# Patient Record
Sex: FEMALE | Race: WHITE | NOT HISPANIC OR LATINO | Employment: OTHER | URBAN - METROPOLITAN AREA
[De-identification: names, ages, dates, MRNs, and addresses within clinical notes are randomized per-mention and may not be internally consistent; named-entity substitution may affect disease eponyms.]

---

## 2017-01-19 ENCOUNTER — COMMUNICATION - HEALTHEAST (OUTPATIENT)
Dept: NURSING | Facility: CLINIC | Age: 82
End: 2017-01-19

## 2017-01-19 ENCOUNTER — AMBULATORY - HEALTHEAST (OUTPATIENT)
Dept: LAB | Facility: CLINIC | Age: 82
End: 2017-01-19

## 2017-01-19 DIAGNOSIS — Z79.01 LONG TERM (CURRENT) USE OF ANTICOAGULANTS: ICD-10-CM

## 2017-01-19 DIAGNOSIS — I82.409 DVT (DEEP VENOUS THROMBOSIS) (H): ICD-10-CM

## 2017-03-01 ENCOUNTER — AMBULATORY - HEALTHEAST (OUTPATIENT)
Dept: LAB | Facility: CLINIC | Age: 82
End: 2017-03-01

## 2017-03-01 ENCOUNTER — COMMUNICATION - HEALTHEAST (OUTPATIENT)
Dept: NURSING | Facility: CLINIC | Age: 82
End: 2017-03-01

## 2017-03-01 DIAGNOSIS — Z79.01 LONG TERM (CURRENT) USE OF ANTICOAGULANTS: ICD-10-CM

## 2017-03-01 DIAGNOSIS — I82.409 DVT (DEEP VENOUS THROMBOSIS) (H): ICD-10-CM

## 2017-03-15 ENCOUNTER — COMMUNICATION - HEALTHEAST (OUTPATIENT)
Dept: NURSING | Facility: CLINIC | Age: 82
End: 2017-03-15

## 2017-03-15 DIAGNOSIS — Z86.711 HISTORY OF PULMONARY EMBOLUS (PE): ICD-10-CM

## 2017-03-15 DIAGNOSIS — Z79.01 LONG TERM (CURRENT) USE OF ANTICOAGULANTS: ICD-10-CM

## 2017-04-05 ENCOUNTER — COMMUNICATION - HEALTHEAST (OUTPATIENT)
Dept: NURSING | Facility: CLINIC | Age: 82
End: 2017-04-05

## 2017-04-11 ENCOUNTER — AMBULATORY - HEALTHEAST (OUTPATIENT)
Dept: LAB | Facility: CLINIC | Age: 82
End: 2017-04-11

## 2017-04-11 ENCOUNTER — COMMUNICATION - HEALTHEAST (OUTPATIENT)
Dept: NURSING | Facility: CLINIC | Age: 82
End: 2017-04-11

## 2017-04-11 DIAGNOSIS — Z79.01 LONG TERM (CURRENT) USE OF ANTICOAGULANTS: ICD-10-CM

## 2017-04-11 DIAGNOSIS — Z86.711 HISTORY OF PULMONARY EMBOLUS (PE): ICD-10-CM

## 2017-05-17 ENCOUNTER — COMMUNICATION - HEALTHEAST (OUTPATIENT)
Dept: NURSING | Facility: CLINIC | Age: 82
End: 2017-05-17

## 2017-05-17 ENCOUNTER — AMBULATORY - HEALTHEAST (OUTPATIENT)
Dept: LAB | Facility: CLINIC | Age: 82
End: 2017-05-17

## 2017-05-17 DIAGNOSIS — Z79.01 LONG TERM (CURRENT) USE OF ANTICOAGULANTS: ICD-10-CM

## 2017-05-17 DIAGNOSIS — Z86.711 HISTORY OF PULMONARY EMBOLUS (PE): ICD-10-CM

## 2017-06-12 ENCOUNTER — COMMUNICATION - HEALTHEAST (OUTPATIENT)
Dept: INTERNAL MEDICINE | Facility: CLINIC | Age: 82
End: 2017-06-12

## 2017-06-21 ENCOUNTER — AMBULATORY - HEALTHEAST (OUTPATIENT)
Dept: LAB | Facility: CLINIC | Age: 82
End: 2017-06-21

## 2017-06-21 ENCOUNTER — COMMUNICATION - HEALTHEAST (OUTPATIENT)
Dept: NURSING | Facility: CLINIC | Age: 82
End: 2017-06-21

## 2017-06-21 DIAGNOSIS — Z79.01 LONG TERM (CURRENT) USE OF ANTICOAGULANTS: ICD-10-CM

## 2017-06-21 DIAGNOSIS — Z86.711 HISTORY OF PULMONARY EMBOLUS (PE): ICD-10-CM

## 2017-07-17 ENCOUNTER — AMBULATORY - HEALTHEAST (OUTPATIENT)
Dept: LAB | Facility: CLINIC | Age: 82
End: 2017-07-17

## 2017-07-17 ENCOUNTER — COMMUNICATION - HEALTHEAST (OUTPATIENT)
Dept: NURSING | Facility: CLINIC | Age: 82
End: 2017-07-17

## 2017-07-17 DIAGNOSIS — Z86.711 HISTORY OF PULMONARY EMBOLUS (PE): ICD-10-CM

## 2017-07-17 DIAGNOSIS — Z79.01 LONG TERM (CURRENT) USE OF ANTICOAGULANTS: ICD-10-CM

## 2017-08-21 ENCOUNTER — COMMUNICATION - HEALTHEAST (OUTPATIENT)
Dept: NURSING | Facility: CLINIC | Age: 82
End: 2017-08-21

## 2017-08-22 ENCOUNTER — COMMUNICATION - HEALTHEAST (OUTPATIENT)
Dept: INTERNAL MEDICINE | Facility: CLINIC | Age: 82
End: 2017-08-22

## 2017-08-23 ENCOUNTER — AMBULATORY - HEALTHEAST (OUTPATIENT)
Dept: LAB | Facility: CLINIC | Age: 82
End: 2017-08-23

## 2017-08-23 ENCOUNTER — COMMUNICATION - HEALTHEAST (OUTPATIENT)
Dept: NURSING | Facility: CLINIC | Age: 82
End: 2017-08-23

## 2017-08-23 ENCOUNTER — AMBULATORY - HEALTHEAST (OUTPATIENT)
Dept: INTERNAL MEDICINE | Facility: CLINIC | Age: 82
End: 2017-08-23

## 2017-08-23 DIAGNOSIS — Z79.01 LONG TERM (CURRENT) USE OF ANTICOAGULANTS: ICD-10-CM

## 2017-08-23 DIAGNOSIS — Z86.711 HISTORY OF PULMONARY EMBOLUS (PE): ICD-10-CM

## 2017-09-16 ENCOUNTER — COMMUNICATION - HEALTHEAST (OUTPATIENT)
Dept: INTERNAL MEDICINE | Facility: CLINIC | Age: 82
End: 2017-09-16

## 2017-09-16 DIAGNOSIS — I10 HTN (HYPERTENSION): ICD-10-CM

## 2017-09-26 ENCOUNTER — AMBULATORY - HEALTHEAST (OUTPATIENT)
Dept: LAB | Facility: CLINIC | Age: 82
End: 2017-09-26

## 2017-09-26 ENCOUNTER — COMMUNICATION - HEALTHEAST (OUTPATIENT)
Dept: NURSING | Facility: CLINIC | Age: 82
End: 2017-09-26

## 2017-09-26 DIAGNOSIS — Z86.711 HISTORY OF PULMONARY EMBOLUS (PE): ICD-10-CM

## 2017-09-26 DIAGNOSIS — Z79.01 LONG TERM (CURRENT) USE OF ANTICOAGULANTS: ICD-10-CM

## 2017-11-07 ENCOUNTER — COMMUNICATION - HEALTHEAST (OUTPATIENT)
Dept: NURSING | Facility: CLINIC | Age: 82
End: 2017-11-07

## 2017-11-07 ENCOUNTER — AMBULATORY - HEALTHEAST (OUTPATIENT)
Dept: NURSING | Facility: CLINIC | Age: 82
End: 2017-11-07

## 2017-11-07 ENCOUNTER — AMBULATORY - HEALTHEAST (OUTPATIENT)
Dept: LAB | Facility: CLINIC | Age: 82
End: 2017-11-07

## 2017-11-07 DIAGNOSIS — Z79.01 LONG TERM CURRENT USE OF ANTICOAGULANT THERAPY: ICD-10-CM

## 2017-11-07 DIAGNOSIS — Z23 NEED FOR INFLUENZA VACCINATION: ICD-10-CM

## 2017-11-07 DIAGNOSIS — Z86.711 HISTORY OF PULMONARY EMBOLUS (PE): ICD-10-CM

## 2017-11-25 ENCOUNTER — COMMUNICATION - HEALTHEAST (OUTPATIENT)
Dept: INTERNAL MEDICINE | Facility: CLINIC | Age: 82
End: 2017-11-25

## 2017-12-18 ENCOUNTER — COMMUNICATION - HEALTHEAST (OUTPATIENT)
Dept: INTERNAL MEDICINE | Facility: CLINIC | Age: 82
End: 2017-12-18

## 2017-12-18 DIAGNOSIS — Z86.711 HISTORY OF PULMONARY EMBOLUS (PE): ICD-10-CM

## 2017-12-18 DIAGNOSIS — Z79.01 LONG TERM CURRENT USE OF ANTICOAGULANT THERAPY: ICD-10-CM

## 2017-12-20 ENCOUNTER — AMBULATORY - HEALTHEAST (OUTPATIENT)
Dept: LAB | Facility: CLINIC | Age: 82
End: 2017-12-20

## 2017-12-20 ENCOUNTER — COMMUNICATION - HEALTHEAST (OUTPATIENT)
Dept: NURSING | Facility: CLINIC | Age: 82
End: 2017-12-20

## 2017-12-20 DIAGNOSIS — Z79.01 LONG TERM CURRENT USE OF ANTICOAGULANT THERAPY: ICD-10-CM

## 2017-12-20 DIAGNOSIS — Z86.711 HISTORY OF PULMONARY EMBOLUS (PE): ICD-10-CM

## 2018-01-11 ENCOUNTER — COMMUNICATION - HEALTHEAST (OUTPATIENT)
Dept: INTERNAL MEDICINE | Facility: CLINIC | Age: 83
End: 2018-01-11

## 2018-01-11 DIAGNOSIS — E87.6 HYPOKALEMIA: ICD-10-CM

## 2018-01-15 ENCOUNTER — AMBULATORY - HEALTHEAST (OUTPATIENT)
Dept: INTERNAL MEDICINE | Facility: CLINIC | Age: 83
End: 2018-01-15

## 2018-01-15 ENCOUNTER — COMMUNICATION - HEALTHEAST (OUTPATIENT)
Dept: NURSING | Facility: CLINIC | Age: 83
End: 2018-01-15

## 2018-01-15 ENCOUNTER — COMMUNICATION - HEALTHEAST (OUTPATIENT)
Dept: INTERNAL MEDICINE | Facility: CLINIC | Age: 83
End: 2018-01-15

## 2018-01-15 ENCOUNTER — OFFICE VISIT - HEALTHEAST (OUTPATIENT)
Dept: INTERNAL MEDICINE | Facility: CLINIC | Age: 83
End: 2018-01-15

## 2018-01-15 DIAGNOSIS — E87.6 HYPOKALEMIA: ICD-10-CM

## 2018-01-15 DIAGNOSIS — M15.9 OSTEOARTHRITIS OF MULTIPLE JOINTS: ICD-10-CM

## 2018-01-15 DIAGNOSIS — Z79.01 LONG TERM CURRENT USE OF ANTICOAGULANT THERAPY: ICD-10-CM

## 2018-01-15 DIAGNOSIS — I10 ESSENTIAL HYPERTENSION: ICD-10-CM

## 2018-01-15 DIAGNOSIS — R60.0 PERIPHERAL EDEMA: ICD-10-CM

## 2018-01-15 DIAGNOSIS — Z12.31 ENCOUNTER FOR SCREENING MAMMOGRAM FOR MALIGNANT NEOPLASM OF BREAST: ICD-10-CM

## 2018-01-15 DIAGNOSIS — Z78.0 POSTMENOPAUSAL: ICD-10-CM

## 2018-01-15 DIAGNOSIS — H40.9 GLAUCOMA: ICD-10-CM

## 2018-01-15 DIAGNOSIS — Z51.81 MEDICATION MONITORING ENCOUNTER: ICD-10-CM

## 2018-01-15 DIAGNOSIS — Z00.00 MEDICARE ANNUAL WELLNESS VISIT, INITIAL: ICD-10-CM

## 2018-01-15 DIAGNOSIS — Z86.711 HISTORY OF PULMONARY EMBOLUS (PE): ICD-10-CM

## 2018-01-15 LAB
ALBUMIN SERPL-MCNC: 3.3 G/DL (ref 3.5–5)
ALBUMIN UR-MCNC: NEGATIVE MG/DL
ALP SERPL-CCNC: 83 U/L (ref 45–120)
ALT SERPL W P-5'-P-CCNC: 19 U/L (ref 0–45)
ANION GAP SERPL CALCULATED.3IONS-SCNC: 10 MMOL/L (ref 5–18)
APPEARANCE UR: CLEAR
AST SERPL W P-5'-P-CCNC: 18 U/L (ref 0–40)
BACTERIA #/AREA URNS HPF: ABNORMAL HPF
BILIRUB SERPL-MCNC: 0.5 MG/DL (ref 0–1)
BILIRUB UR QL STRIP: NEGATIVE
BUN SERPL-MCNC: 19 MG/DL (ref 8–28)
CALCIUM SERPL-MCNC: 9.4 MG/DL (ref 8.5–10.5)
CHLORIDE BLD-SCNC: 101 MMOL/L (ref 98–107)
CHOLEST SERPL-MCNC: 196 MG/DL
CO2 SERPL-SCNC: 31 MMOL/L (ref 22–31)
COLOR UR AUTO: YELLOW
CREAT SERPL-MCNC: 0.8 MG/DL (ref 0.6–1.1)
FASTING STATUS PATIENT QL REPORTED: YES
GFR SERPL CREATININE-BSD FRML MDRD: >60 ML/MIN/1.73M2
GLUCOSE BLD-MCNC: 94 MG/DL (ref 70–125)
GLUCOSE UR STRIP-MCNC: NEGATIVE MG/DL
HDLC SERPL-MCNC: 53 MG/DL
HGB BLD-MCNC: 13 G/DL (ref 12–16)
HGB UR QL STRIP: ABNORMAL
INR PPP: 2.8 (ref 0.9–1.1)
KETONES UR STRIP-MCNC: NEGATIVE MG/DL
LDLC SERPL CALC-MCNC: 127 MG/DL
LEUKOCYTE ESTERASE UR QL STRIP: NEGATIVE
NITRATE UR QL: NEGATIVE
PH UR STRIP: 7 [PH] (ref 5–8)
POTASSIUM BLD-SCNC: 3.1 MMOL/L (ref 3.5–5)
PROT SERPL-MCNC: 7.3 G/DL (ref 6–8)
RBC #/AREA URNS AUTO: ABNORMAL HPF
SODIUM SERPL-SCNC: 142 MMOL/L (ref 136–145)
SP GR UR STRIP: 1.02 (ref 1–1.03)
SQUAMOUS #/AREA URNS AUTO: ABNORMAL LPF
TRIGL SERPL-MCNC: 80 MG/DL
UROBILINOGEN UR STRIP-ACNC: ABNORMAL
WBC #/AREA URNS AUTO: ABNORMAL HPF

## 2018-01-15 ASSESSMENT — MIFFLIN-ST. JEOR: SCORE: 1240.34

## 2018-02-05 ENCOUNTER — COMMUNICATION - HEALTHEAST (OUTPATIENT)
Dept: NURSING | Facility: CLINIC | Age: 83
End: 2018-02-05

## 2018-02-05 DIAGNOSIS — Z79.01 LONG TERM CURRENT USE OF ANTICOAGULANT THERAPY: ICD-10-CM

## 2018-02-05 DIAGNOSIS — Z86.711 HISTORY OF PULMONARY EMBOLUS (PE): ICD-10-CM

## 2018-02-15 ENCOUNTER — COMMUNICATION - HEALTHEAST (OUTPATIENT)
Dept: NURSING | Facility: CLINIC | Age: 83
End: 2018-02-15

## 2018-02-15 ENCOUNTER — COMMUNICATION - HEALTHEAST (OUTPATIENT)
Dept: INTERNAL MEDICINE | Facility: CLINIC | Age: 83
End: 2018-02-15

## 2018-02-15 ENCOUNTER — AMBULATORY - HEALTHEAST (OUTPATIENT)
Dept: INTERNAL MEDICINE | Facility: CLINIC | Age: 83
End: 2018-02-15

## 2018-02-15 ENCOUNTER — AMBULATORY - HEALTHEAST (OUTPATIENT)
Dept: LAB | Facility: CLINIC | Age: 83
End: 2018-02-15

## 2018-02-15 DIAGNOSIS — Z79.01 LONG TERM CURRENT USE OF ANTICOAGULANT THERAPY: ICD-10-CM

## 2018-02-15 DIAGNOSIS — Z86.711 HISTORY OF PULMONARY EMBOLUS (PE): ICD-10-CM

## 2018-02-15 DIAGNOSIS — I10 ESSENTIAL HYPERTENSION: ICD-10-CM

## 2018-02-15 LAB
ANION GAP SERPL CALCULATED.3IONS-SCNC: 7 MMOL/L (ref 5–18)
BUN SERPL-MCNC: 24 MG/DL (ref 8–28)
CALCIUM SERPL-MCNC: 9.1 MG/DL (ref 8.5–10.5)
CHLORIDE BLD-SCNC: 106 MMOL/L (ref 98–107)
CO2 SERPL-SCNC: 29 MMOL/L (ref 22–31)
CREAT SERPL-MCNC: 1.11 MG/DL (ref 0.6–1.1)
GFR SERPL CREATININE-BSD FRML MDRD: 47 ML/MIN/1.73M2
GLUCOSE BLD-MCNC: 85 MG/DL (ref 70–125)
INR PPP: 2.7 (ref 0.9–1.1)
POTASSIUM BLD-SCNC: 5.3 MMOL/L (ref 3.5–5)
SODIUM SERPL-SCNC: 142 MMOL/L (ref 136–145)

## 2018-02-16 ENCOUNTER — COMMUNICATION - HEALTHEAST (OUTPATIENT)
Dept: INTERNAL MEDICINE | Facility: CLINIC | Age: 83
End: 2018-02-16

## 2018-02-24 ENCOUNTER — COMMUNICATION - HEALTHEAST (OUTPATIENT)
Dept: INTERNAL MEDICINE | Facility: CLINIC | Age: 83
End: 2018-02-24

## 2018-02-24 DIAGNOSIS — I10 HYPERTENSION: ICD-10-CM

## 2018-03-06 ENCOUNTER — COMMUNICATION - HEALTHEAST (OUTPATIENT)
Dept: INTERNAL MEDICINE | Facility: CLINIC | Age: 83
End: 2018-03-06

## 2018-03-06 DIAGNOSIS — I10 ESSENTIAL HYPERTENSION: ICD-10-CM

## 2018-03-19 ENCOUNTER — COMMUNICATION - HEALTHEAST (OUTPATIENT)
Dept: INTERNAL MEDICINE | Facility: CLINIC | Age: 83
End: 2018-03-19

## 2018-03-19 DIAGNOSIS — I10 HTN (HYPERTENSION): ICD-10-CM

## 2018-03-19 DIAGNOSIS — Z86.711 HISTORY OF PULMONARY EMBOLUS (PE): ICD-10-CM

## 2018-03-22 ENCOUNTER — RECORDS - HEALTHEAST (OUTPATIENT)
Dept: ADMINISTRATIVE | Facility: OTHER | Age: 83
End: 2018-03-22

## 2018-03-22 ENCOUNTER — COMMUNICATION - HEALTHEAST (OUTPATIENT)
Dept: NURSING | Facility: CLINIC | Age: 83
End: 2018-03-22

## 2018-03-22 ENCOUNTER — RECORDS - HEALTHEAST (OUTPATIENT)
Dept: BONE DENSITY | Facility: CLINIC | Age: 83
End: 2018-03-22

## 2018-03-22 ENCOUNTER — AMBULATORY - HEALTHEAST (OUTPATIENT)
Dept: LAB | Facility: CLINIC | Age: 83
End: 2018-03-22

## 2018-03-22 DIAGNOSIS — I10 ESSENTIAL HYPERTENSION: ICD-10-CM

## 2018-03-22 DIAGNOSIS — Z78.0 ASYMPTOMATIC MENOPAUSAL STATE: ICD-10-CM

## 2018-03-22 DIAGNOSIS — Z79.01 LONG TERM CURRENT USE OF ANTICOAGULANT THERAPY: ICD-10-CM

## 2018-03-22 DIAGNOSIS — Z86.711 HISTORY OF PULMONARY EMBOLUS (PE): ICD-10-CM

## 2018-03-22 LAB
ANION GAP SERPL CALCULATED.3IONS-SCNC: 8 MMOL/L (ref 5–18)
BUN SERPL-MCNC: 28 MG/DL (ref 8–28)
CALCIUM SERPL-MCNC: 9.3 MG/DL (ref 8.5–10.5)
CHLORIDE BLD-SCNC: 104 MMOL/L (ref 98–107)
CO2 SERPL-SCNC: 31 MMOL/L (ref 22–31)
CREAT SERPL-MCNC: 1.12 MG/DL (ref 0.6–1.1)
GFR SERPL CREATININE-BSD FRML MDRD: 46 ML/MIN/1.73M2
GLUCOSE BLD-MCNC: 83 MG/DL (ref 70–125)
INR PPP: 2.1 (ref 0.9–1.1)
POTASSIUM BLD-SCNC: 4.5 MMOL/L (ref 3.5–5)
SODIUM SERPL-SCNC: 143 MMOL/L (ref 136–145)

## 2018-03-23 ENCOUNTER — COMMUNICATION - HEALTHEAST (OUTPATIENT)
Dept: INTERNAL MEDICINE | Facility: CLINIC | Age: 83
End: 2018-03-23

## 2018-03-25 ENCOUNTER — AMBULATORY - HEALTHEAST (OUTPATIENT)
Dept: INTERNAL MEDICINE | Facility: CLINIC | Age: 83
End: 2018-03-25

## 2018-03-25 ENCOUNTER — COMMUNICATION - HEALTHEAST (OUTPATIENT)
Dept: INTERNAL MEDICINE | Facility: CLINIC | Age: 83
End: 2018-03-25

## 2018-03-25 DIAGNOSIS — M85.80 OSTEOPENIA: ICD-10-CM

## 2018-05-01 ENCOUNTER — AMBULATORY - HEALTHEAST (OUTPATIENT)
Dept: LAB | Facility: CLINIC | Age: 83
End: 2018-05-01

## 2018-05-01 ENCOUNTER — COMMUNICATION - HEALTHEAST (OUTPATIENT)
Dept: ANTICOAGULATION | Facility: CLINIC | Age: 83
End: 2018-05-01

## 2018-05-01 DIAGNOSIS — Z79.01 LONG TERM CURRENT USE OF ANTICOAGULANT THERAPY: ICD-10-CM

## 2018-05-01 DIAGNOSIS — M85.80 OSTEOPENIA: ICD-10-CM

## 2018-05-01 DIAGNOSIS — Z86.711 HISTORY OF PULMONARY EMBOLUS (PE): ICD-10-CM

## 2018-05-01 LAB — INR PPP: 2 (ref 0.9–1.1)

## 2018-05-02 LAB — 25(OH)D3 SERPL-MCNC: 13.7 NG/ML (ref 30–80)

## 2018-05-03 ENCOUNTER — COMMUNICATION - HEALTHEAST (OUTPATIENT)
Dept: INTERNAL MEDICINE | Facility: CLINIC | Age: 83
End: 2018-05-03

## 2018-05-04 ENCOUNTER — COMMUNICATION - HEALTHEAST (OUTPATIENT)
Dept: INTERNAL MEDICINE | Facility: CLINIC | Age: 83
End: 2018-05-04

## 2018-06-04 ENCOUNTER — AMBULATORY - HEALTHEAST (OUTPATIENT)
Dept: LAB | Facility: CLINIC | Age: 83
End: 2018-06-04

## 2018-06-04 ENCOUNTER — COMMUNICATION - HEALTHEAST (OUTPATIENT)
Dept: ANTICOAGULATION | Facility: CLINIC | Age: 83
End: 2018-06-04

## 2018-06-04 DIAGNOSIS — Z86.711 HISTORY OF PULMONARY EMBOLUS (PE): ICD-10-CM

## 2018-06-04 DIAGNOSIS — Z79.01 LONG TERM CURRENT USE OF ANTICOAGULANT THERAPY: ICD-10-CM

## 2018-06-04 LAB — INR PPP: 2.4 (ref 0.9–1.1)

## 2018-06-11 ENCOUNTER — COMMUNICATION - HEALTHEAST (OUTPATIENT)
Dept: INTERNAL MEDICINE | Facility: CLINIC | Age: 83
End: 2018-06-11

## 2018-06-11 DIAGNOSIS — I10 HTN (HYPERTENSION): ICD-10-CM

## 2018-07-16 ENCOUNTER — COMMUNICATION - HEALTHEAST (OUTPATIENT)
Dept: ANTICOAGULATION | Facility: CLINIC | Age: 83
End: 2018-07-16

## 2018-07-16 ENCOUNTER — AMBULATORY - HEALTHEAST (OUTPATIENT)
Dept: LAB | Facility: CLINIC | Age: 83
End: 2018-07-16

## 2018-07-16 DIAGNOSIS — Z79.01 LONG TERM CURRENT USE OF ANTICOAGULANT THERAPY: ICD-10-CM

## 2018-07-16 DIAGNOSIS — Z86.711 HISTORY OF PULMONARY EMBOLUS (PE): ICD-10-CM

## 2018-07-16 LAB — INR PPP: 2.5 (ref 0.9–1.1)

## 2018-08-29 ENCOUNTER — AMBULATORY - HEALTHEAST (OUTPATIENT)
Dept: LAB | Facility: CLINIC | Age: 83
End: 2018-08-29

## 2018-08-29 ENCOUNTER — COMMUNICATION - HEALTHEAST (OUTPATIENT)
Dept: ANTICOAGULATION | Facility: CLINIC | Age: 83
End: 2018-08-29

## 2018-08-29 DIAGNOSIS — Z86.711 HISTORY OF PULMONARY EMBOLUS (PE): ICD-10-CM

## 2018-08-29 DIAGNOSIS — Z79.01 LONG TERM CURRENT USE OF ANTICOAGULANT THERAPY: ICD-10-CM

## 2018-08-29 LAB — INR PPP: 2.9 (ref 0.9–1.1)

## 2018-10-03 ENCOUNTER — COMMUNICATION - HEALTHEAST (OUTPATIENT)
Dept: INTERNAL MEDICINE | Facility: CLINIC | Age: 83
End: 2018-10-03

## 2018-10-03 DIAGNOSIS — Z79.01 LONG TERM CURRENT USE OF ANTICOAGULANT THERAPY: ICD-10-CM

## 2018-10-03 DIAGNOSIS — Z86.711 HISTORY OF PULMONARY EMBOLUS (PE): ICD-10-CM

## 2018-10-11 ENCOUNTER — AMBULATORY - HEALTHEAST (OUTPATIENT)
Dept: LAB | Facility: CLINIC | Age: 83
End: 2018-10-11

## 2018-10-11 ENCOUNTER — COMMUNICATION - HEALTHEAST (OUTPATIENT)
Dept: ANTICOAGULATION | Facility: CLINIC | Age: 83
End: 2018-10-11

## 2018-10-11 DIAGNOSIS — Z86.711 HISTORY OF PULMONARY EMBOLUS (PE): ICD-10-CM

## 2018-10-11 DIAGNOSIS — Z79.01 LONG TERM CURRENT USE OF ANTICOAGULANT THERAPY: ICD-10-CM

## 2018-10-11 LAB — INR PPP: 2.2 (ref 0.9–1.1)

## 2018-11-20 ENCOUNTER — COMMUNICATION - HEALTHEAST (OUTPATIENT)
Dept: INTERNAL MEDICINE | Facility: CLINIC | Age: 83
End: 2018-11-20

## 2018-11-20 DIAGNOSIS — I10 HYPERTENSION: ICD-10-CM

## 2018-11-27 ENCOUNTER — COMMUNICATION - HEALTHEAST (OUTPATIENT)
Dept: ANTICOAGULATION | Facility: CLINIC | Age: 83
End: 2018-11-27

## 2018-11-27 ENCOUNTER — AMBULATORY - HEALTHEAST (OUTPATIENT)
Dept: LAB | Facility: CLINIC | Age: 83
End: 2018-11-27

## 2018-11-27 DIAGNOSIS — Z86.711 HISTORY OF PULMONARY EMBOLUS (PE): ICD-10-CM

## 2018-11-27 DIAGNOSIS — Z79.01 LONG TERM CURRENT USE OF ANTICOAGULANT THERAPY: ICD-10-CM

## 2018-11-27 LAB — INR PPP: 3.2 (ref 0.9–1.1)

## 2018-12-06 ENCOUNTER — COMMUNICATION - HEALTHEAST (OUTPATIENT)
Dept: INTERNAL MEDICINE | Facility: CLINIC | Age: 83
End: 2018-12-06

## 2018-12-06 DIAGNOSIS — I10 HTN (HYPERTENSION): ICD-10-CM

## 2018-12-13 ENCOUNTER — AMBULATORY - HEALTHEAST (OUTPATIENT)
Dept: LAB | Facility: CLINIC | Age: 83
End: 2018-12-13

## 2018-12-13 ENCOUNTER — COMMUNICATION - HEALTHEAST (OUTPATIENT)
Dept: ANTICOAGULATION | Facility: CLINIC | Age: 83
End: 2018-12-13

## 2018-12-13 DIAGNOSIS — Z79.01 LONG TERM CURRENT USE OF ANTICOAGULANT THERAPY: ICD-10-CM

## 2018-12-13 DIAGNOSIS — Z86.711 HISTORY OF PULMONARY EMBOLUS (PE): ICD-10-CM

## 2018-12-13 LAB — INR PPP: 2.7 (ref 0.9–1.1)

## 2019-01-10 ENCOUNTER — COMMUNICATION - HEALTHEAST (OUTPATIENT)
Dept: ANTICOAGULATION | Facility: CLINIC | Age: 84
End: 2019-01-10

## 2019-01-10 DIAGNOSIS — Z79.01 LONG TERM CURRENT USE OF ANTICOAGULANT THERAPY: ICD-10-CM

## 2019-01-10 DIAGNOSIS — Z86.711 HISTORY OF PULMONARY EMBOLUS (PE): ICD-10-CM

## 2019-01-14 ENCOUNTER — COMMUNICATION - HEALTHEAST (OUTPATIENT)
Dept: INTERNAL MEDICINE | Facility: CLINIC | Age: 84
End: 2019-01-14

## 2019-01-14 DIAGNOSIS — I10 ESSENTIAL HYPERTENSION: ICD-10-CM

## 2019-01-16 ENCOUNTER — AMBULATORY - HEALTHEAST (OUTPATIENT)
Dept: LAB | Facility: CLINIC | Age: 84
End: 2019-01-16

## 2019-01-16 ENCOUNTER — COMMUNICATION - HEALTHEAST (OUTPATIENT)
Dept: ANTICOAGULATION | Facility: CLINIC | Age: 84
End: 2019-01-16

## 2019-01-16 DIAGNOSIS — Z86.711 HISTORY OF PULMONARY EMBOLUS (PE): ICD-10-CM

## 2019-01-16 DIAGNOSIS — Z79.01 LONG TERM CURRENT USE OF ANTICOAGULANT THERAPY: ICD-10-CM

## 2019-01-16 LAB — INR PPP: 2.4 (ref 0.9–1.1)

## 2019-01-29 ENCOUNTER — OFFICE VISIT - HEALTHEAST (OUTPATIENT)
Dept: INTERNAL MEDICINE | Facility: CLINIC | Age: 84
End: 2019-01-29

## 2019-01-29 DIAGNOSIS — E55.9 VITAMIN D DEFICIENCY: ICD-10-CM

## 2019-01-29 DIAGNOSIS — Z86.711 HISTORY OF PULMONARY EMBOLUS (PE): ICD-10-CM

## 2019-01-29 DIAGNOSIS — M15.0 PRIMARY OSTEOARTHRITIS INVOLVING MULTIPLE JOINTS: ICD-10-CM

## 2019-01-29 DIAGNOSIS — Z79.01 LONG TERM CURRENT USE OF ANTICOAGULANT THERAPY: ICD-10-CM

## 2019-01-29 DIAGNOSIS — M79.672 LEFT FOOT PAIN: ICD-10-CM

## 2019-01-29 DIAGNOSIS — I10 ESSENTIAL HYPERTENSION: ICD-10-CM

## 2019-01-29 DIAGNOSIS — Z00.00 MEDICARE ANNUAL WELLNESS VISIT, SUBSEQUENT: ICD-10-CM

## 2019-01-29 DIAGNOSIS — Z12.31 ENCOUNTER FOR SCREENING MAMMOGRAM FOR MALIGNANT NEOPLASM OF BREAST: ICD-10-CM

## 2019-01-29 DIAGNOSIS — M85.80 OSTEOPENIA, UNSPECIFIED LOCATION: ICD-10-CM

## 2019-01-29 DIAGNOSIS — R60.0 PERIPHERAL EDEMA: ICD-10-CM

## 2019-01-29 LAB
ALBUMIN SERPL-MCNC: 4 G/DL (ref 3.5–5)
ALBUMIN UR-MCNC: NEGATIVE MG/DL
ALP SERPL-CCNC: 77 U/L (ref 45–120)
ALT SERPL W P-5'-P-CCNC: 18 U/L (ref 0–45)
ANION GAP SERPL CALCULATED.3IONS-SCNC: 11 MMOL/L (ref 5–18)
APPEARANCE UR: ABNORMAL
AST SERPL W P-5'-P-CCNC: 17 U/L (ref 0–40)
BILIRUB SERPL-MCNC: 0.7 MG/DL (ref 0–1)
BILIRUB UR QL STRIP: NEGATIVE
BUN SERPL-MCNC: 27 MG/DL (ref 8–28)
CALCIUM SERPL-MCNC: 10.2 MG/DL (ref 8.5–10.5)
CHLORIDE BLD-SCNC: 101 MMOL/L (ref 98–107)
CHOLEST SERPL-MCNC: 210 MG/DL
CO2 SERPL-SCNC: 29 MMOL/L (ref 22–31)
COLOR UR AUTO: YELLOW
CREAT SERPL-MCNC: 1.29 MG/DL (ref 0.6–1.1)
FASTING STATUS PATIENT QL REPORTED: YES
GFR SERPL CREATININE-BSD FRML MDRD: 39 ML/MIN/1.73M2
GLUCOSE BLD-MCNC: 89 MG/DL (ref 70–125)
GLUCOSE UR STRIP-MCNC: NEGATIVE MG/DL
HDLC SERPL-MCNC: 55 MG/DL
HGB BLD-MCNC: 13.1 G/DL (ref 12–16)
HGB UR QL STRIP: ABNORMAL
KETONES UR STRIP-MCNC: NEGATIVE MG/DL
LDLC SERPL CALC-MCNC: 133 MG/DL
LEUKOCYTE ESTERASE UR QL STRIP: NEGATIVE
NITRATE UR QL: NEGATIVE
PH UR STRIP: 7 [PH] (ref 4.5–8)
POTASSIUM BLD-SCNC: 3.8 MMOL/L (ref 3.5–5)
PROT SERPL-MCNC: 7.8 G/DL (ref 6–8)
SODIUM SERPL-SCNC: 141 MMOL/L (ref 136–145)
SP GR UR STRIP: 1 (ref 1–1.03)
TRIGL SERPL-MCNC: 111 MG/DL
UROBILINOGEN UR STRIP-ACNC: ABNORMAL

## 2019-01-29 ASSESSMENT — MIFFLIN-ST. JEOR: SCORE: 1240.34

## 2019-01-30 LAB — 25(OH)D3 SERPL-MCNC: 43.7 NG/ML (ref 30–80)

## 2019-02-03 ENCOUNTER — COMMUNICATION - HEALTHEAST (OUTPATIENT)
Dept: INTERNAL MEDICINE | Facility: CLINIC | Age: 84
End: 2019-02-03

## 2019-02-18 ENCOUNTER — COMMUNICATION - HEALTHEAST (OUTPATIENT)
Dept: INTERNAL MEDICINE | Facility: CLINIC | Age: 84
End: 2019-02-18

## 2019-02-18 DIAGNOSIS — I10 HYPERTENSION: ICD-10-CM

## 2019-02-21 ENCOUNTER — AMBULATORY - HEALTHEAST (OUTPATIENT)
Dept: LAB | Facility: CLINIC | Age: 84
End: 2019-02-21

## 2019-02-21 ENCOUNTER — COMMUNICATION - HEALTHEAST (OUTPATIENT)
Dept: ANTICOAGULATION | Facility: CLINIC | Age: 84
End: 2019-02-21

## 2019-02-21 DIAGNOSIS — Z79.01 LONG TERM CURRENT USE OF ANTICOAGULANT THERAPY: ICD-10-CM

## 2019-02-21 DIAGNOSIS — Z86.711 HISTORY OF PULMONARY EMBOLUS (PE): ICD-10-CM

## 2019-02-21 LAB — INR PPP: 2.7 (ref 0.9–1.1)

## 2019-02-28 ENCOUNTER — COMMUNICATION - HEALTHEAST (OUTPATIENT)
Dept: INTERNAL MEDICINE | Facility: CLINIC | Age: 84
End: 2019-02-28

## 2019-03-04 ENCOUNTER — COMMUNICATION - HEALTHEAST (OUTPATIENT)
Dept: INTERNAL MEDICINE | Facility: CLINIC | Age: 84
End: 2019-03-04

## 2019-03-04 DIAGNOSIS — I10 HTN (HYPERTENSION): ICD-10-CM

## 2019-04-02 ENCOUNTER — COMMUNICATION - HEALTHEAST (OUTPATIENT)
Dept: INTERNAL MEDICINE | Facility: CLINIC | Age: 84
End: 2019-04-02

## 2019-04-02 DIAGNOSIS — Z79.01 LONG TERM CURRENT USE OF ANTICOAGULANT THERAPY: ICD-10-CM

## 2019-04-02 DIAGNOSIS — Z86.711 HISTORY OF PULMONARY EMBOLUS (PE): ICD-10-CM

## 2019-04-03 ENCOUNTER — COMMUNICATION - HEALTHEAST (OUTPATIENT)
Dept: ANTICOAGULATION | Facility: CLINIC | Age: 84
End: 2019-04-03

## 2019-04-03 ENCOUNTER — AMBULATORY - HEALTHEAST (OUTPATIENT)
Dept: LAB | Facility: CLINIC | Age: 84
End: 2019-04-03

## 2019-04-03 DIAGNOSIS — Z79.01 LONG TERM CURRENT USE OF ANTICOAGULANT THERAPY: ICD-10-CM

## 2019-04-03 DIAGNOSIS — Z86.711 HISTORY OF PULMONARY EMBOLUS (PE): ICD-10-CM

## 2019-04-03 LAB — INR PPP: 2.1 (ref 0.9–1.1)

## 2019-04-15 ENCOUNTER — COMMUNICATION - HEALTHEAST (OUTPATIENT)
Dept: INTERNAL MEDICINE | Facility: CLINIC | Age: 84
End: 2019-04-15

## 2019-04-15 DIAGNOSIS — I10 ESSENTIAL HYPERTENSION: ICD-10-CM

## 2019-04-29 ENCOUNTER — OFFICE VISIT - HEALTHEAST (OUTPATIENT)
Dept: INTERNAL MEDICINE | Facility: CLINIC | Age: 84
End: 2019-04-29

## 2019-04-29 ENCOUNTER — COMMUNICATION - HEALTHEAST (OUTPATIENT)
Dept: ANTICOAGULATION | Facility: CLINIC | Age: 84
End: 2019-04-29

## 2019-04-29 DIAGNOSIS — Z79.01 LONG TERM CURRENT USE OF ANTICOAGULANT THERAPY: ICD-10-CM

## 2019-04-29 DIAGNOSIS — I10 ESSENTIAL HYPERTENSION: ICD-10-CM

## 2019-04-29 DIAGNOSIS — Z86.711 HISTORY OF PULMONARY EMBOLUS (PE): ICD-10-CM

## 2019-04-29 LAB
ANION GAP SERPL CALCULATED.3IONS-SCNC: 10 MMOL/L (ref 5–18)
BUN SERPL-MCNC: 34 MG/DL (ref 8–28)
CALCIUM SERPL-MCNC: 9.4 MG/DL (ref 8.5–10.5)
CHLORIDE BLD-SCNC: 105 MMOL/L (ref 98–107)
CO2 SERPL-SCNC: 26 MMOL/L (ref 22–31)
CREAT SERPL-MCNC: 1.57 MG/DL (ref 0.6–1.1)
GFR SERPL CREATININE-BSD FRML MDRD: 31 ML/MIN/1.73M2
GLUCOSE BLD-MCNC: 90 MG/DL (ref 70–125)
INR PPP: 2.3 (ref 0.9–1.1)
POTASSIUM BLD-SCNC: 4.1 MMOL/L (ref 3.5–5)
SODIUM SERPL-SCNC: 141 MMOL/L (ref 136–145)

## 2019-04-29 ASSESSMENT — MIFFLIN-ST. JEOR: SCORE: 1235.81

## 2019-05-01 ENCOUNTER — COMMUNICATION - HEALTHEAST (OUTPATIENT)
Dept: INTERNAL MEDICINE | Facility: CLINIC | Age: 84
End: 2019-05-01

## 2019-05-01 ENCOUNTER — AMBULATORY - HEALTHEAST (OUTPATIENT)
Dept: INTERNAL MEDICINE | Facility: CLINIC | Age: 84
End: 2019-05-01

## 2019-05-01 DIAGNOSIS — Z51.81 MEDICATION MONITORING ENCOUNTER: ICD-10-CM

## 2019-06-05 ENCOUNTER — COMMUNICATION - HEALTHEAST (OUTPATIENT)
Dept: ANTICOAGULATION | Facility: CLINIC | Age: 84
End: 2019-06-05

## 2019-06-05 ENCOUNTER — AMBULATORY - HEALTHEAST (OUTPATIENT)
Dept: LAB | Facility: CLINIC | Age: 84
End: 2019-06-05

## 2019-06-05 DIAGNOSIS — Z51.81 MEDICATION MONITORING ENCOUNTER: ICD-10-CM

## 2019-06-05 DIAGNOSIS — Z79.01 LONG TERM CURRENT USE OF ANTICOAGULANT THERAPY: ICD-10-CM

## 2019-06-05 DIAGNOSIS — Z86.711 HISTORY OF PULMONARY EMBOLUS (PE): ICD-10-CM

## 2019-06-05 LAB
ANION GAP SERPL CALCULATED.3IONS-SCNC: 6 MMOL/L (ref 5–18)
BUN SERPL-MCNC: 32 MG/DL (ref 8–28)
CALCIUM SERPL-MCNC: 9.6 MG/DL (ref 8.5–10.5)
CHLORIDE BLD-SCNC: 104 MMOL/L (ref 98–107)
CO2 SERPL-SCNC: 31 MMOL/L (ref 22–31)
CREAT SERPL-MCNC: 1.49 MG/DL (ref 0.6–1.1)
GFR SERPL CREATININE-BSD FRML MDRD: 33 ML/MIN/1.73M2
GLUCOSE BLD-MCNC: 81 MG/DL (ref 70–125)
INR PPP: 2.3 (ref 0.9–1.1)
POTASSIUM BLD-SCNC: 4.5 MMOL/L (ref 3.5–5)
SODIUM SERPL-SCNC: 141 MMOL/L (ref 136–145)

## 2019-06-06 ENCOUNTER — COMMUNICATION - HEALTHEAST (OUTPATIENT)
Dept: INTERNAL MEDICINE | Facility: CLINIC | Age: 84
End: 2019-06-06

## 2019-07-16 ENCOUNTER — AMBULATORY - HEALTHEAST (OUTPATIENT)
Dept: LAB | Facility: CLINIC | Age: 84
End: 2019-07-16

## 2019-07-16 ENCOUNTER — COMMUNICATION - HEALTHEAST (OUTPATIENT)
Dept: ANTICOAGULATION | Facility: CLINIC | Age: 84
End: 2019-07-16

## 2019-07-16 DIAGNOSIS — Z86.711 HISTORY OF PULMONARY EMBOLUS (PE): ICD-10-CM

## 2019-07-16 DIAGNOSIS — Z79.01 LONG TERM CURRENT USE OF ANTICOAGULANT THERAPY: ICD-10-CM

## 2019-07-16 LAB — INR PPP: 2.5 (ref 0.9–1.1)

## 2019-08-29 ENCOUNTER — COMMUNICATION - HEALTHEAST (OUTPATIENT)
Dept: ANTICOAGULATION | Facility: CLINIC | Age: 84
End: 2019-08-29

## 2019-08-29 ENCOUNTER — AMBULATORY - HEALTHEAST (OUTPATIENT)
Dept: LAB | Facility: CLINIC | Age: 84
End: 2019-08-29

## 2019-08-29 DIAGNOSIS — Z86.711 HISTORY OF PULMONARY EMBOLUS (PE): ICD-10-CM

## 2019-08-29 DIAGNOSIS — Z79.01 LONG TERM CURRENT USE OF ANTICOAGULANT THERAPY: ICD-10-CM

## 2019-08-29 LAB — INR PPP: 2.5 (ref 0.9–1.1)

## 2019-09-19 ENCOUNTER — RECORDS - HEALTHEAST (OUTPATIENT)
Dept: ADMINISTRATIVE | Facility: OTHER | Age: 84
End: 2019-09-19

## 2019-10-09 ENCOUNTER — AMBULATORY - HEALTHEAST (OUTPATIENT)
Dept: NURSING | Facility: CLINIC | Age: 84
End: 2019-10-09

## 2019-10-09 ENCOUNTER — AMBULATORY - HEALTHEAST (OUTPATIENT)
Dept: LAB | Facility: CLINIC | Age: 84
End: 2019-10-09

## 2019-10-09 ENCOUNTER — COMMUNICATION - HEALTHEAST (OUTPATIENT)
Dept: ANTICOAGULATION | Facility: CLINIC | Age: 84
End: 2019-10-09

## 2019-10-09 DIAGNOSIS — Z86.711 HISTORY OF PULMONARY EMBOLUS (PE): ICD-10-CM

## 2019-10-09 DIAGNOSIS — Z79.01 LONG TERM CURRENT USE OF ANTICOAGULANT THERAPY: ICD-10-CM

## 2019-10-09 DIAGNOSIS — Z23 FLU VACCINE NEED: ICD-10-CM

## 2019-10-09 LAB — INR PPP: 3.8 (ref 0.9–1.1)

## 2019-10-17 ENCOUNTER — COMMUNICATION - HEALTHEAST (OUTPATIENT)
Dept: INTERNAL MEDICINE | Facility: CLINIC | Age: 84
End: 2019-10-17

## 2019-10-17 DIAGNOSIS — Z86.711 HISTORY OF PULMONARY EMBOLUS (PE): ICD-10-CM

## 2019-10-17 DIAGNOSIS — Z79.01 LONG TERM CURRENT USE OF ANTICOAGULANT THERAPY: ICD-10-CM

## 2019-10-23 ENCOUNTER — COMMUNICATION - HEALTHEAST (OUTPATIENT)
Dept: ANTICOAGULATION | Facility: CLINIC | Age: 84
End: 2019-10-23

## 2019-10-23 ENCOUNTER — AMBULATORY - HEALTHEAST (OUTPATIENT)
Dept: LAB | Facility: CLINIC | Age: 84
End: 2019-10-23

## 2019-10-23 DIAGNOSIS — Z79.01 LONG TERM CURRENT USE OF ANTICOAGULANT THERAPY: ICD-10-CM

## 2019-10-23 DIAGNOSIS — Z86.711 HISTORY OF PULMONARY EMBOLUS (PE): ICD-10-CM

## 2019-10-23 LAB — INR PPP: 2.6 (ref 0.9–1.1)

## 2019-11-06 ENCOUNTER — AMBULATORY - HEALTHEAST (OUTPATIENT)
Dept: LAB | Facility: CLINIC | Age: 84
End: 2019-11-06

## 2019-11-06 ENCOUNTER — COMMUNICATION - HEALTHEAST (OUTPATIENT)
Dept: ANTICOAGULATION | Facility: CLINIC | Age: 84
End: 2019-11-06

## 2019-11-06 DIAGNOSIS — Z86.711 HISTORY OF PULMONARY EMBOLUS (PE): ICD-10-CM

## 2019-11-06 DIAGNOSIS — Z79.01 LONG TERM CURRENT USE OF ANTICOAGULANT THERAPY: ICD-10-CM

## 2019-11-06 LAB — INR PPP: 2.5 (ref 0.9–1.1)

## 2019-12-04 ENCOUNTER — AMBULATORY - HEALTHEAST (OUTPATIENT)
Dept: LAB | Facility: CLINIC | Age: 84
End: 2019-12-04

## 2019-12-04 ENCOUNTER — COMMUNICATION - HEALTHEAST (OUTPATIENT)
Dept: ANTICOAGULATION | Facility: CLINIC | Age: 84
End: 2019-12-04

## 2019-12-04 DIAGNOSIS — Z79.01 LONG TERM CURRENT USE OF ANTICOAGULANT THERAPY: ICD-10-CM

## 2019-12-04 DIAGNOSIS — Z86.711 HISTORY OF PULMONARY EMBOLUS (PE): ICD-10-CM

## 2019-12-04 LAB — INR PPP: 3.4 (ref 0.9–1.1)

## 2019-12-19 ENCOUNTER — COMMUNICATION - HEALTHEAST (OUTPATIENT)
Dept: ANTICOAGULATION | Facility: CLINIC | Age: 84
End: 2019-12-19

## 2019-12-19 ENCOUNTER — AMBULATORY - HEALTHEAST (OUTPATIENT)
Dept: LAB | Facility: CLINIC | Age: 84
End: 2019-12-19

## 2019-12-19 DIAGNOSIS — Z79.01 LONG TERM CURRENT USE OF ANTICOAGULANT THERAPY: ICD-10-CM

## 2019-12-19 DIAGNOSIS — Z86.711 HISTORY OF PULMONARY EMBOLUS (PE): ICD-10-CM

## 2019-12-19 LAB — INR PPP: 2.6 (ref 0.9–1.1)

## 2019-12-24 ENCOUNTER — COMMUNICATION - HEALTHEAST (OUTPATIENT)
Dept: ANTICOAGULATION | Facility: CLINIC | Age: 84
End: 2019-12-24

## 2019-12-24 DIAGNOSIS — Z86.711 HISTORY OF PULMONARY EMBOLUS (PE): ICD-10-CM

## 2020-01-08 ENCOUNTER — COMMUNICATION - HEALTHEAST (OUTPATIENT)
Dept: ANTICOAGULATION | Facility: CLINIC | Age: 85
End: 2020-01-08

## 2020-01-08 ENCOUNTER — AMBULATORY - HEALTHEAST (OUTPATIENT)
Dept: LAB | Facility: CLINIC | Age: 85
End: 2020-01-08

## 2020-01-08 DIAGNOSIS — Z79.01 LONG TERM CURRENT USE OF ANTICOAGULANT THERAPY: ICD-10-CM

## 2020-01-08 DIAGNOSIS — Z86.711 HISTORY OF PULMONARY EMBOLUS (PE): ICD-10-CM

## 2020-01-08 LAB — INR PPP: 2.3 (ref 0.9–1.1)

## 2020-01-15 ENCOUNTER — COMMUNICATION - HEALTHEAST (OUTPATIENT)
Dept: INTERNAL MEDICINE | Facility: CLINIC | Age: 85
End: 2020-01-15

## 2020-01-15 DIAGNOSIS — I10 ESSENTIAL HYPERTENSION: ICD-10-CM

## 2020-02-05 ENCOUNTER — AMBULATORY - HEALTHEAST (OUTPATIENT)
Dept: LAB | Facility: CLINIC | Age: 85
End: 2020-02-05

## 2020-02-05 ENCOUNTER — COMMUNICATION - HEALTHEAST (OUTPATIENT)
Dept: ANTICOAGULATION | Facility: CLINIC | Age: 85
End: 2020-02-05

## 2020-02-05 DIAGNOSIS — Z79.01 LONG TERM CURRENT USE OF ANTICOAGULANT THERAPY: ICD-10-CM

## 2020-02-05 DIAGNOSIS — Z86.711 HISTORY OF PULMONARY EMBOLUS (PE): ICD-10-CM

## 2020-02-05 LAB — INR PPP: 2.6 (ref 0.9–1.1)

## 2020-02-17 ENCOUNTER — COMMUNICATION - HEALTHEAST (OUTPATIENT)
Dept: INTERNAL MEDICINE | Facility: CLINIC | Age: 85
End: 2020-02-17

## 2020-02-17 DIAGNOSIS — I10 ESSENTIAL HYPERTENSION: ICD-10-CM

## 2020-03-02 ENCOUNTER — COMMUNICATION - HEALTHEAST (OUTPATIENT)
Dept: INTERNAL MEDICINE | Facility: CLINIC | Age: 85
End: 2020-03-02

## 2020-03-02 DIAGNOSIS — I10 HTN (HYPERTENSION): ICD-10-CM

## 2020-03-02 DIAGNOSIS — I10 HYPERTENSION: ICD-10-CM

## 2020-03-11 ENCOUNTER — COMMUNICATION - HEALTHEAST (OUTPATIENT)
Dept: ANTICOAGULATION | Facility: CLINIC | Age: 85
End: 2020-03-11

## 2020-03-11 ENCOUNTER — AMBULATORY - HEALTHEAST (OUTPATIENT)
Dept: LAB | Facility: CLINIC | Age: 85
End: 2020-03-11

## 2020-03-11 DIAGNOSIS — Z86.711 HISTORY OF PULMONARY EMBOLUS (PE): ICD-10-CM

## 2020-03-11 DIAGNOSIS — Z79.01 LONG TERM CURRENT USE OF ANTICOAGULANT THERAPY: ICD-10-CM

## 2020-03-11 LAB — INR PPP: 2.9 (ref 0.9–1.1)

## 2020-04-06 ENCOUNTER — COMMUNICATION - HEALTHEAST (OUTPATIENT)
Dept: INTERNAL MEDICINE | Facility: CLINIC | Age: 85
End: 2020-04-06

## 2020-04-06 DIAGNOSIS — I10 ESSENTIAL HYPERTENSION: ICD-10-CM

## 2020-04-22 ENCOUNTER — AMBULATORY - HEALTHEAST (OUTPATIENT)
Dept: LAB | Facility: CLINIC | Age: 85
End: 2020-04-22

## 2020-04-22 ENCOUNTER — COMMUNICATION - HEALTHEAST (OUTPATIENT)
Dept: ANTICOAGULATION | Facility: CLINIC | Age: 85
End: 2020-04-22

## 2020-04-22 DIAGNOSIS — Z79.01 LONG TERM CURRENT USE OF ANTICOAGULANT THERAPY: ICD-10-CM

## 2020-04-22 DIAGNOSIS — Z86.711 HISTORY OF PULMONARY EMBOLUS (PE): ICD-10-CM

## 2020-04-22 LAB — INR PPP: 2.3 (ref 0.9–1.1)

## 2020-05-25 ENCOUNTER — COMMUNICATION - HEALTHEAST (OUTPATIENT)
Dept: INTERNAL MEDICINE | Facility: CLINIC | Age: 85
End: 2020-05-25

## 2020-05-25 DIAGNOSIS — I10 ESSENTIAL HYPERTENSION: ICD-10-CM

## 2020-05-26 ENCOUNTER — COMMUNICATION - HEALTHEAST (OUTPATIENT)
Dept: INTERNAL MEDICINE | Facility: CLINIC | Age: 85
End: 2020-05-26

## 2020-05-26 DIAGNOSIS — I10 HTN (HYPERTENSION): ICD-10-CM

## 2020-05-26 DIAGNOSIS — I10 HYPERTENSION: ICD-10-CM

## 2020-05-26 DIAGNOSIS — Z79.01 LONG TERM CURRENT USE OF ANTICOAGULANT THERAPY: ICD-10-CM

## 2020-05-26 DIAGNOSIS — Z86.711 HISTORY OF PULMONARY EMBOLUS (PE): ICD-10-CM

## 2020-05-28 RX ORDER — ENALAPRIL MALEATE 10 MG/1
TABLET ORAL
Qty: 180 TABLET | Refills: 3 | Status: SHIPPED | OUTPATIENT
Start: 2020-05-28 | End: 2021-07-07

## 2020-06-05 ENCOUNTER — OFFICE VISIT - HEALTHEAST (OUTPATIENT)
Dept: INTERNAL MEDICINE | Facility: CLINIC | Age: 85
End: 2020-06-05

## 2020-06-05 DIAGNOSIS — Z00.00 MEDICARE ANNUAL WELLNESS VISIT, SUBSEQUENT: ICD-10-CM

## 2020-06-05 DIAGNOSIS — Z13.220 ENCOUNTER FOR SCREENING FOR LIPOID DISORDERS: ICD-10-CM

## 2020-06-05 DIAGNOSIS — E55.9 VITAMIN D DEFICIENCY: ICD-10-CM

## 2020-06-05 DIAGNOSIS — I10 ESSENTIAL HYPERTENSION: ICD-10-CM

## 2020-06-05 DIAGNOSIS — Z12.31 ENCOUNTER FOR SCREENING MAMMOGRAM FOR MALIGNANT NEOPLASM OF BREAST: ICD-10-CM

## 2020-06-09 ENCOUNTER — COMMUNICATION - HEALTHEAST (OUTPATIENT)
Dept: ANTICOAGULATION | Facility: CLINIC | Age: 85
End: 2020-06-09

## 2020-06-09 ENCOUNTER — AMBULATORY - HEALTHEAST (OUTPATIENT)
Dept: LAB | Facility: CLINIC | Age: 85
End: 2020-06-09

## 2020-06-09 DIAGNOSIS — Z86.711 HISTORY OF PULMONARY EMBOLUS (PE): ICD-10-CM

## 2020-06-09 DIAGNOSIS — E55.9 VITAMIN D DEFICIENCY: ICD-10-CM

## 2020-06-09 DIAGNOSIS — Z79.01 LONG TERM CURRENT USE OF ANTICOAGULANT THERAPY: ICD-10-CM

## 2020-06-09 DIAGNOSIS — I10 ESSENTIAL HYPERTENSION: ICD-10-CM

## 2020-06-09 DIAGNOSIS — Z13.220 ENCOUNTER FOR SCREENING FOR LIPOID DISORDERS: ICD-10-CM

## 2020-06-09 LAB
ALBUMIN SERPL-MCNC: 3.7 G/DL (ref 3.5–5)
ALP SERPL-CCNC: 86 U/L (ref 45–120)
ALT SERPL W P-5'-P-CCNC: 14 U/L (ref 0–45)
ANION GAP SERPL CALCULATED.3IONS-SCNC: 10 MMOL/L (ref 5–18)
AST SERPL W P-5'-P-CCNC: 17 U/L (ref 0–40)
BILIRUB SERPL-MCNC: 0.7 MG/DL (ref 0–1)
BUN SERPL-MCNC: 32 MG/DL (ref 8–28)
CALCIUM SERPL-MCNC: 9.1 MG/DL (ref 8.5–10.5)
CHLORIDE BLD-SCNC: 102 MMOL/L (ref 98–107)
CHOLEST SERPL-MCNC: 180 MG/DL
CO2 SERPL-SCNC: 28 MMOL/L (ref 22–31)
CREAT SERPL-MCNC: 1.42 MG/DL (ref 0.6–1.1)
ERYTHROCYTE [DISTWIDTH] IN BLOOD BY AUTOMATED COUNT: 12.9 % (ref 11–14.5)
FASTING STATUS PATIENT QL REPORTED: YES
GFR SERPL CREATININE-BSD FRML MDRD: 35 ML/MIN/1.73M2
GLUCOSE BLD-MCNC: 80 MG/DL (ref 70–125)
HCT VFR BLD AUTO: 37.8 % (ref 35–47)
HDLC SERPL-MCNC: 49 MG/DL
HGB BLD-MCNC: 12.8 G/DL (ref 12–16)
INR PPP: 2.6 (ref 0.9–1.1)
LDLC SERPL CALC-MCNC: 113 MG/DL
MCH RBC QN AUTO: 29.3 PG (ref 27–34)
MCHC RBC AUTO-ENTMCNC: 33.8 G/DL (ref 32–36)
MCV RBC AUTO: 87 FL (ref 80–100)
PLATELET # BLD AUTO: 267 THOU/UL (ref 140–440)
PMV BLD AUTO: 6.9 FL (ref 7–10)
POTASSIUM BLD-SCNC: 3.9 MMOL/L (ref 3.5–5)
PROT SERPL-MCNC: 6.7 G/DL (ref 6–8)
RBC # BLD AUTO: 4.35 MILL/UL (ref 3.8–5.4)
SODIUM SERPL-SCNC: 140 MMOL/L (ref 136–145)
TRIGL SERPL-MCNC: 89 MG/DL
WBC: 8 THOU/UL (ref 4–11)

## 2020-06-12 ENCOUNTER — COMMUNICATION - HEALTHEAST (OUTPATIENT)
Dept: INTERNAL MEDICINE | Facility: CLINIC | Age: 85
End: 2020-06-12

## 2020-06-12 LAB — 25(OH)D3 SERPL-MCNC: 43.6 NG/ML (ref 30–80)

## 2020-06-29 ENCOUNTER — COMMUNICATION - HEALTHEAST (OUTPATIENT)
Dept: INTERNAL MEDICINE | Facility: CLINIC | Age: 85
End: 2020-06-29

## 2020-06-29 DIAGNOSIS — I10 ESSENTIAL HYPERTENSION: ICD-10-CM

## 2020-07-30 ENCOUNTER — COMMUNICATION - HEALTHEAST (OUTPATIENT)
Dept: ANTICOAGULATION | Facility: CLINIC | Age: 85
End: 2020-07-30

## 2020-07-30 ENCOUNTER — AMBULATORY - HEALTHEAST (OUTPATIENT)
Dept: LAB | Facility: CLINIC | Age: 85
End: 2020-07-30

## 2020-07-30 DIAGNOSIS — Z86.711 HISTORY OF PULMONARY EMBOLUS (PE): ICD-10-CM

## 2020-07-30 DIAGNOSIS — Z79.01 LONG TERM CURRENT USE OF ANTICOAGULANT THERAPY: ICD-10-CM

## 2020-07-30 LAB — INR PPP: 2.3 (ref 0.9–1.1)

## 2020-09-17 ENCOUNTER — AMBULATORY - HEALTHEAST (OUTPATIENT)
Dept: LAB | Facility: CLINIC | Age: 85
End: 2020-09-17

## 2020-09-17 ENCOUNTER — COMMUNICATION - HEALTHEAST (OUTPATIENT)
Dept: ANTICOAGULATION | Facility: CLINIC | Age: 85
End: 2020-09-17

## 2020-09-17 DIAGNOSIS — Z86.711 HISTORY OF PULMONARY EMBOLUS (PE): ICD-10-CM

## 2020-09-17 DIAGNOSIS — Z79.01 LONG TERM CURRENT USE OF ANTICOAGULANT THERAPY: ICD-10-CM

## 2020-09-17 LAB — INR PPP: 2.7 (ref 0.9–1.1)

## 2020-09-20 ENCOUNTER — RECORDS - HEALTHEAST (OUTPATIENT)
Dept: ADMINISTRATIVE | Facility: OTHER | Age: 85
End: 2020-09-20

## 2020-09-25 ENCOUNTER — RECORDS - HEALTHEAST (OUTPATIENT)
Dept: ADMINISTRATIVE | Facility: OTHER | Age: 85
End: 2020-09-25
Payer: COMMERCIAL

## 2020-09-28 ENCOUNTER — RECORDS - HEALTHEAST (OUTPATIENT)
Dept: ADMINISTRATIVE | Facility: OTHER | Age: 85
End: 2020-09-28
Payer: COMMERCIAL

## 2020-09-29 ENCOUNTER — RECORDS - HEALTHEAST (OUTPATIENT)
Dept: ADMINISTRATIVE | Facility: OTHER | Age: 85
End: 2020-09-29

## 2020-09-30 ENCOUNTER — OFFICE VISIT - HEALTHEAST (OUTPATIENT)
Dept: GERIATRICS | Facility: CLINIC | Age: 85
End: 2020-09-30

## 2020-09-30 DIAGNOSIS — K59.1 FUNCTIONAL DIARRHEA: ICD-10-CM

## 2020-09-30 DIAGNOSIS — Z51.81 ANTICOAGULATION MANAGEMENT ENCOUNTER: ICD-10-CM

## 2020-09-30 DIAGNOSIS — Z79.01 ANTICOAGULATION MANAGEMENT ENCOUNTER: ICD-10-CM

## 2020-09-30 DIAGNOSIS — Z95.0 STATUS POST BIVENTRICULAR CARDIAC PACEMAKER INSERTION: ICD-10-CM

## 2020-09-30 DIAGNOSIS — S32.82XD MULTIPLE CLOSED FRACTURES OF PELVIS WITHOUT DISRUPTION OF PELVIC RING WITH ROUTINE HEALING: ICD-10-CM

## 2020-09-30 DIAGNOSIS — Z71.89 ADVANCE CARE PLANNING: ICD-10-CM

## 2020-09-30 DIAGNOSIS — R53.81 PHYSICAL DECONDITIONING: ICD-10-CM

## 2020-09-30 DIAGNOSIS — I49.5 TACHY-BRADY SYNDROME (H): ICD-10-CM

## 2020-10-01 ENCOUNTER — RECORDS - HEALTHEAST (OUTPATIENT)
Dept: LAB | Facility: CLINIC | Age: 85
End: 2020-10-01

## 2020-10-02 ENCOUNTER — AMBULATORY - HEALTHEAST (OUTPATIENT)
Dept: ADMINISTRATIVE | Facility: CLINIC | Age: 85
End: 2020-10-02

## 2020-10-02 ENCOUNTER — OFFICE VISIT - HEALTHEAST (OUTPATIENT)
Dept: GERIATRICS | Facility: CLINIC | Age: 85
End: 2020-10-02

## 2020-10-02 DIAGNOSIS — I49.5 TACHY-BRADY SYNDROME (H): ICD-10-CM

## 2020-10-02 DIAGNOSIS — R53.81 PHYSICAL DECONDITIONING: ICD-10-CM

## 2020-10-02 DIAGNOSIS — Z95.0 STATUS POST BIVENTRICULAR CARDIAC PACEMAKER INSERTION: ICD-10-CM

## 2020-10-02 DIAGNOSIS — S32.82XD MULTIPLE CLOSED FRACTURES OF PELVIS WITHOUT DISRUPTION OF PELVIC RING WITH ROUTINE HEALING: ICD-10-CM

## 2020-10-02 LAB — INR PPP: 3.37 (ref 0.9–1.1)

## 2020-10-02 RX ORDER — ACETAMINOPHEN 325 MG/1
325 TABLET ORAL EVERY 4 HOURS PRN
Status: SHIPPED | COMMUNITY
Start: 2020-10-02 | End: 2024-04-15

## 2020-10-05 ENCOUNTER — OFFICE VISIT - HEALTHEAST (OUTPATIENT)
Dept: GERIATRICS | Facility: CLINIC | Age: 85
End: 2020-10-05

## 2020-10-05 DIAGNOSIS — R53.81 PHYSICAL DECONDITIONING: ICD-10-CM

## 2020-10-05 DIAGNOSIS — Z95.0 STATUS POST BIVENTRICULAR CARDIAC PACEMAKER INSERTION: ICD-10-CM

## 2020-10-05 DIAGNOSIS — S32.82XD MULTIPLE CLOSED FRACTURES OF PELVIS WITHOUT DISRUPTION OF PELVIC RING WITH ROUTINE HEALING: ICD-10-CM

## 2020-10-05 DIAGNOSIS — I49.5 TACHY-BRADY SYNDROME (H): ICD-10-CM

## 2020-10-07 ENCOUNTER — OFFICE VISIT - HEALTHEAST (OUTPATIENT)
Dept: GERIATRICS | Facility: CLINIC | Age: 85
End: 2020-10-07

## 2020-10-07 DIAGNOSIS — M15.0 PRIMARY OSTEOARTHRITIS INVOLVING MULTIPLE JOINTS: ICD-10-CM

## 2020-10-07 DIAGNOSIS — I49.5 TACHY-BRADY SYNDROME (H): ICD-10-CM

## 2020-10-07 DIAGNOSIS — Z95.0 STATUS POST BIVENTRICULAR CARDIAC PACEMAKER INSERTION: ICD-10-CM

## 2020-10-07 DIAGNOSIS — S32.82XD MULTIPLE CLOSED FRACTURES OF PELVIS WITHOUT DISRUPTION OF PELVIC RING WITH ROUTINE HEALING: ICD-10-CM

## 2020-10-07 DIAGNOSIS — R53.81 PHYSICAL DECONDITIONING: ICD-10-CM

## 2020-10-08 ENCOUNTER — OFFICE VISIT - HEALTHEAST (OUTPATIENT)
Dept: GERIATRICS | Facility: CLINIC | Age: 85
End: 2020-10-08

## 2020-10-08 DIAGNOSIS — I49.5 TACHY-BRADY SYNDROME (H): ICD-10-CM

## 2020-10-08 DIAGNOSIS — Z79.01 LONG TERM CURRENT USE OF ANTICOAGULANT THERAPY: ICD-10-CM

## 2020-10-08 DIAGNOSIS — I10 ESSENTIAL HYPERTENSION: ICD-10-CM

## 2020-10-08 DIAGNOSIS — Z86.711 HISTORY OF PULMONARY EMBOLUS (PE): ICD-10-CM

## 2020-10-09 ENCOUNTER — OFFICE VISIT - HEALTHEAST (OUTPATIENT)
Dept: GERIATRICS | Facility: CLINIC | Age: 85
End: 2020-10-09

## 2020-10-09 DIAGNOSIS — M15.0 PRIMARY OSTEOARTHRITIS INVOLVING MULTIPLE JOINTS: ICD-10-CM

## 2020-10-12 ENCOUNTER — OFFICE VISIT - HEALTHEAST (OUTPATIENT)
Dept: GERIATRICS | Facility: CLINIC | Age: 85
End: 2020-10-12

## 2020-10-12 DIAGNOSIS — R53.81 PHYSICAL DECONDITIONING: ICD-10-CM

## 2020-10-12 DIAGNOSIS — M15.0 PRIMARY OSTEOARTHRITIS INVOLVING MULTIPLE JOINTS: ICD-10-CM

## 2020-10-12 DIAGNOSIS — I49.5 TACHY-BRADY SYNDROME (H): ICD-10-CM

## 2020-10-12 DIAGNOSIS — S32.82XD MULTIPLE CLOSED FRACTURES OF PELVIS WITHOUT DISRUPTION OF PELVIC RING WITH ROUTINE HEALING: ICD-10-CM

## 2020-10-12 DIAGNOSIS — Z95.0 STATUS POST BIVENTRICULAR CARDIAC PACEMAKER INSERTION: ICD-10-CM

## 2020-10-13 ENCOUNTER — OFFICE VISIT - HEALTHEAST (OUTPATIENT)
Dept: GERIATRICS | Facility: CLINIC | Age: 85
End: 2020-10-13

## 2020-10-13 DIAGNOSIS — Z51.81 ANTICOAGULATION MANAGEMENT ENCOUNTER: ICD-10-CM

## 2020-10-13 DIAGNOSIS — Z86.711 HISTORY OF PULMONARY EMBOLUS (PE): ICD-10-CM

## 2020-10-13 DIAGNOSIS — Z79.01 ANTICOAGULATION MANAGEMENT ENCOUNTER: ICD-10-CM

## 2020-10-15 ENCOUNTER — COMMUNICATION - HEALTHEAST (OUTPATIENT)
Dept: GERIATRICS | Facility: CLINIC | Age: 85
End: 2020-10-15

## 2020-10-15 ENCOUNTER — RECORDS - HEALTHEAST (OUTPATIENT)
Dept: LAB | Facility: CLINIC | Age: 85
End: 2020-10-15

## 2020-10-16 ENCOUNTER — COMMUNICATION - HEALTHEAST (OUTPATIENT)
Dept: GERIATRICS | Facility: CLINIC | Age: 85
End: 2020-10-16

## 2020-10-16 LAB
ANION GAP SERPL CALCULATED.3IONS-SCNC: 7 MMOL/L (ref 5–18)
BUN SERPL-MCNC: 26 MG/DL (ref 8–28)
CALCIUM SERPL-MCNC: 8.4 MG/DL (ref 8.5–10.5)
CHLORIDE BLD-SCNC: 109 MMOL/L (ref 98–107)
CO2 SERPL-SCNC: 26 MMOL/L (ref 22–31)
CREAT SERPL-MCNC: 0.8 MG/DL (ref 0.6–1.1)
DIGOXIN LEVEL LHE- HISTORICAL: <0.3 NG/ML (ref 0.5–2)
GFR SERPL CREATININE-BSD FRML MDRD: >60 ML/MIN/1.73M2
GLUCOSE BLD-MCNC: 89 MG/DL (ref 70–125)
POTASSIUM BLD-SCNC: 3.5 MMOL/L (ref 3.5–5)
SODIUM SERPL-SCNC: 142 MMOL/L (ref 136–145)

## 2020-10-19 ENCOUNTER — OFFICE VISIT - HEALTHEAST (OUTPATIENT)
Dept: GERIATRICS | Facility: CLINIC | Age: 85
End: 2020-10-19

## 2020-10-19 DIAGNOSIS — Z79.01 ANTICOAGULATION MANAGEMENT ENCOUNTER: ICD-10-CM

## 2020-10-19 DIAGNOSIS — R53.81 PHYSICAL DECONDITIONING: ICD-10-CM

## 2020-10-19 DIAGNOSIS — S32.82XD MULTIPLE CLOSED FRACTURES OF PELVIS WITHOUT DISRUPTION OF PELVIC RING WITH ROUTINE HEALING: ICD-10-CM

## 2020-10-19 DIAGNOSIS — Z79.01 LONG TERM CURRENT USE OF ANTICOAGULANT THERAPY: ICD-10-CM

## 2020-10-19 DIAGNOSIS — Z51.81 ANTICOAGULATION MANAGEMENT ENCOUNTER: ICD-10-CM

## 2020-10-19 DIAGNOSIS — Z95.0 STATUS POST BIVENTRICULAR CARDIAC PACEMAKER INSERTION: ICD-10-CM

## 2020-10-19 DIAGNOSIS — I49.5 TACHY-BRADY SYNDROME (H): ICD-10-CM

## 2020-10-23 ENCOUNTER — OFFICE VISIT - HEALTHEAST (OUTPATIENT)
Dept: GERIATRICS | Facility: CLINIC | Age: 85
End: 2020-10-23

## 2020-10-23 DIAGNOSIS — S32.82XD MULTIPLE CLOSED FRACTURES OF PELVIS WITHOUT DISRUPTION OF PELVIC RING WITH ROUTINE HEALING: ICD-10-CM

## 2020-10-23 DIAGNOSIS — R53.81 PHYSICAL DECONDITIONING: ICD-10-CM

## 2020-10-23 DIAGNOSIS — Z95.0 STATUS POST BIVENTRICULAR CARDIAC PACEMAKER INSERTION: ICD-10-CM

## 2020-10-23 DIAGNOSIS — I49.5 TACHY-BRADY SYNDROME (H): ICD-10-CM

## 2020-10-26 ENCOUNTER — OFFICE VISIT - HEALTHEAST (OUTPATIENT)
Dept: GERIATRICS | Facility: CLINIC | Age: 85
End: 2020-10-26

## 2020-10-26 DIAGNOSIS — R53.81 PHYSICAL DECONDITIONING: ICD-10-CM

## 2020-10-26 DIAGNOSIS — I49.5 TACHY-BRADY SYNDROME (H): ICD-10-CM

## 2020-10-26 DIAGNOSIS — Z95.0 STATUS POST BIVENTRICULAR CARDIAC PACEMAKER INSERTION: ICD-10-CM

## 2020-10-26 DIAGNOSIS — S32.82XD MULTIPLE CLOSED FRACTURES OF PELVIS WITHOUT DISRUPTION OF PELVIC RING WITH ROUTINE HEALING: ICD-10-CM

## 2020-10-27 ENCOUNTER — RECORDS - HEALTHEAST (OUTPATIENT)
Dept: LAB | Facility: CLINIC | Age: 85
End: 2020-10-27

## 2020-10-28 ENCOUNTER — OFFICE VISIT - HEALTHEAST (OUTPATIENT)
Dept: GERIATRICS | Facility: CLINIC | Age: 85
End: 2020-10-28

## 2020-10-28 ENCOUNTER — COMMUNICATION - HEALTHEAST (OUTPATIENT)
Dept: INTERNAL MEDICINE | Facility: CLINIC | Age: 85
End: 2020-10-28

## 2020-10-28 DIAGNOSIS — I49.5 TACHY-BRADY SYNDROME (H): ICD-10-CM

## 2020-10-28 DIAGNOSIS — Z95.0 STATUS POST BIVENTRICULAR CARDIAC PACEMAKER INSERTION: ICD-10-CM

## 2020-10-28 DIAGNOSIS — R53.81 PHYSICAL DECONDITIONING: ICD-10-CM

## 2020-10-28 DIAGNOSIS — S32.82XD MULTIPLE CLOSED FRACTURES OF PELVIS WITHOUT DISRUPTION OF PELVIC RING WITH ROUTINE HEALING: ICD-10-CM

## 2020-10-28 LAB
ANION GAP SERPL CALCULATED.3IONS-SCNC: 8 MMOL/L (ref 5–18)
BUN SERPL-MCNC: 23 MG/DL (ref 8–28)
CALCIUM SERPL-MCNC: 9 MG/DL (ref 8.5–10.5)
CHLORIDE BLD-SCNC: 105 MMOL/L (ref 98–107)
CO2 SERPL-SCNC: 30 MMOL/L (ref 22–31)
CREAT SERPL-MCNC: 0.87 MG/DL (ref 0.6–1.1)
GFR SERPL CREATININE-BSD FRML MDRD: >60 ML/MIN/1.73M2
GLUCOSE BLD-MCNC: 89 MG/DL (ref 70–125)
POTASSIUM BLD-SCNC: 3.3 MMOL/L (ref 3.5–5)
SODIUM SERPL-SCNC: 143 MMOL/L (ref 136–145)

## 2020-10-29 ENCOUNTER — COMMUNICATION - HEALTHEAST (OUTPATIENT)
Dept: GERIATRICS | Facility: CLINIC | Age: 85
End: 2020-10-29

## 2020-10-29 ENCOUNTER — AMBULATORY - HEALTHEAST (OUTPATIENT)
Dept: GERIATRICS | Facility: CLINIC | Age: 85
End: 2020-10-29

## 2020-10-30 ENCOUNTER — COMMUNICATION - HEALTHEAST (OUTPATIENT)
Dept: ANTICOAGULATION | Facility: CLINIC | Age: 85
End: 2020-10-30

## 2020-10-30 ENCOUNTER — AMBULATORY - HEALTHEAST (OUTPATIENT)
Dept: LAB | Facility: CLINIC | Age: 85
End: 2020-10-30

## 2020-10-30 DIAGNOSIS — Z86.711 HISTORY OF PULMONARY EMBOLUS (PE): ICD-10-CM

## 2020-10-30 DIAGNOSIS — Z79.01 LONG TERM CURRENT USE OF ANTICOAGULANT THERAPY: ICD-10-CM

## 2020-10-30 LAB — INR PPP: 2.5 (ref 0.9–1.1)

## 2020-11-09 ENCOUNTER — COMMUNICATION - HEALTHEAST (OUTPATIENT)
Dept: ANTICOAGULATION | Facility: CLINIC | Age: 85
End: 2020-11-09

## 2020-11-09 ENCOUNTER — OFFICE VISIT - HEALTHEAST (OUTPATIENT)
Dept: INTERNAL MEDICINE | Facility: CLINIC | Age: 85
End: 2020-11-09

## 2020-11-09 DIAGNOSIS — I49.5 TACHY-BRADY SYNDROME (H): ICD-10-CM

## 2020-11-09 DIAGNOSIS — I48.0 PAROXYSMAL ATRIAL FIBRILLATION (H): ICD-10-CM

## 2020-11-09 DIAGNOSIS — R60.0 PERIPHERAL EDEMA: ICD-10-CM

## 2020-11-09 DIAGNOSIS — S32.82XD MULTIPLE CLOSED FRACTURES OF PELVIS WITHOUT DISRUPTION OF PELVIC RING WITH ROUTINE HEALING: ICD-10-CM

## 2020-11-09 DIAGNOSIS — E55.9 VITAMIN D DEFICIENCY: ICD-10-CM

## 2020-11-09 DIAGNOSIS — Z51.81 ENCOUNTER FOR THERAPEUTIC DRUG MONITORING: ICD-10-CM

## 2020-11-09 DIAGNOSIS — M80.00XD AGE-RELATED OSTEOPOROSIS WITH CURRENT PATHOLOGICAL FRACTURE WITH ROUTINE HEALING: ICD-10-CM

## 2020-11-09 DIAGNOSIS — Z79.01 LONG TERM CURRENT USE OF ANTICOAGULANT THERAPY: ICD-10-CM

## 2020-11-09 DIAGNOSIS — I10 ESSENTIAL HYPERTENSION: ICD-10-CM

## 2020-11-09 DIAGNOSIS — Z86.711 HISTORY OF PULMONARY EMBOLUS (PE): ICD-10-CM

## 2020-11-09 DIAGNOSIS — Z95.0 STATUS POST BIVENTRICULAR CARDIAC PACEMAKER INSERTION: ICD-10-CM

## 2020-11-09 DIAGNOSIS — R53.81 PHYSICAL DECONDITIONING: ICD-10-CM

## 2020-11-09 LAB
ALBUMIN SERPL-MCNC: 3.8 G/DL (ref 3.5–5)
ALP SERPL-CCNC: 132 U/L (ref 45–120)
ALT SERPL W P-5'-P-CCNC: 21 U/L (ref 0–45)
ANION GAP SERPL CALCULATED.3IONS-SCNC: 11 MMOL/L (ref 5–18)
AST SERPL W P-5'-P-CCNC: 19 U/L (ref 0–40)
BILIRUB SERPL-MCNC: 0.5 MG/DL (ref 0–1)
BUN SERPL-MCNC: 24 MG/DL (ref 8–28)
CALCIUM SERPL-MCNC: 9.5 MG/DL (ref 8.5–10.5)
CHLORIDE BLD-SCNC: 102 MMOL/L (ref 98–107)
CO2 SERPL-SCNC: 30 MMOL/L (ref 22–31)
CREAT SERPL-MCNC: 0.99 MG/DL (ref 0.6–1.1)
ERYTHROCYTE [DISTWIDTH] IN BLOOD BY AUTOMATED COUNT: 14.8 % (ref 11–14.5)
GFR SERPL CREATININE-BSD FRML MDRD: 53 ML/MIN/1.73M2
GLUCOSE BLD-MCNC: 96 MG/DL (ref 70–125)
HCT VFR BLD AUTO: 36.1 % (ref 35–47)
HGB BLD-MCNC: 11.8 G/DL (ref 12–16)
INR PPP: 2.9 (ref 0.9–1.1)
MAGNESIUM SERPL-MCNC: 1.4 MG/DL (ref 1.8–2.6)
MCH RBC QN AUTO: 29 PG (ref 27–34)
MCHC RBC AUTO-ENTMCNC: 32.8 G/DL (ref 32–36)
MCV RBC AUTO: 89 FL (ref 80–100)
PLATELET # BLD AUTO: 338 THOU/UL (ref 140–440)
PMV BLD AUTO: 6.8 FL (ref 7–10)
POTASSIUM BLD-SCNC: 3.6 MMOL/L (ref 3.5–5)
PROT SERPL-MCNC: 6.6 G/DL (ref 6–8)
RBC # BLD AUTO: 4.07 MILL/UL (ref 3.8–5.4)
SODIUM SERPL-SCNC: 143 MMOL/L (ref 136–145)
WBC: 6.7 THOU/UL (ref 4–11)

## 2020-11-09 RX ORDER — FUROSEMIDE 40 MG
40 TABLET ORAL DAILY
Qty: 90 TABLET | Refills: 3 | Status: SHIPPED | OUTPATIENT
Start: 2020-11-09 | End: 2021-11-08

## 2020-11-09 RX ORDER — ALENDRONATE SODIUM 70 MG/1
70 TABLET ORAL
Qty: 12 TABLET | Refills: 3 | Status: SHIPPED | OUTPATIENT
Start: 2020-11-09 | End: 2021-10-28

## 2020-11-09 RX ORDER — METOPROLOL SUCCINATE 100 MG/1
100 TABLET, EXTENDED RELEASE ORAL DAILY
Qty: 90 TABLET | Refills: 3 | Status: SHIPPED
Start: 2020-11-09 | End: 2021-07-06

## 2020-11-09 ASSESSMENT — MIFFLIN-ST. JEOR: SCORE: 1181.38

## 2020-11-11 ENCOUNTER — RECORDS - HEALTHEAST (OUTPATIENT)
Dept: ADMINISTRATIVE | Facility: OTHER | Age: 85
End: 2020-11-11

## 2020-11-11 ENCOUNTER — AMBULATORY - HEALTHEAST (OUTPATIENT)
Dept: INTERNAL MEDICINE | Facility: CLINIC | Age: 85
End: 2020-11-11

## 2020-11-11 ENCOUNTER — COMMUNICATION - HEALTHEAST (OUTPATIENT)
Dept: INTERNAL MEDICINE | Facility: CLINIC | Age: 85
End: 2020-11-11

## 2020-11-11 DIAGNOSIS — E83.42 HYPOMAGNESEMIA: ICD-10-CM

## 2020-11-11 RX ORDER — MAGNESIUM OXIDE 400 MG/1
800 TABLET ORAL DAILY
Qty: 200 TABLET | Refills: 1 | Status: SHIPPED | COMMUNITY
Start: 2020-11-11

## 2020-11-23 ENCOUNTER — AMBULATORY - HEALTHEAST (OUTPATIENT)
Dept: LAB | Facility: CLINIC | Age: 85
End: 2020-11-23

## 2020-11-23 ENCOUNTER — COMMUNICATION - HEALTHEAST (OUTPATIENT)
Dept: ANTICOAGULATION | Facility: CLINIC | Age: 85
End: 2020-11-23

## 2020-11-23 DIAGNOSIS — Z86.711 HISTORY OF PULMONARY EMBOLUS (PE): ICD-10-CM

## 2020-11-23 DIAGNOSIS — Z79.01 LONG TERM CURRENT USE OF ANTICOAGULANT THERAPY: ICD-10-CM

## 2020-11-23 LAB — INR PPP: 2.3 (ref 0.9–1.1)

## 2020-12-15 ENCOUNTER — AMBULATORY - HEALTHEAST (OUTPATIENT)
Dept: ANTICOAGULATION | Facility: CLINIC | Age: 85
End: 2020-12-15

## 2020-12-15 DIAGNOSIS — Z79.01 LONG TERM CURRENT USE OF ANTICOAGULANT THERAPY: ICD-10-CM

## 2020-12-15 DIAGNOSIS — I48.0 PAROXYSMAL ATRIAL FIBRILLATION (H): ICD-10-CM

## 2020-12-30 ENCOUNTER — AMBULATORY - HEALTHEAST (OUTPATIENT)
Dept: FAMILY MEDICINE | Facility: CLINIC | Age: 85
End: 2020-12-30

## 2020-12-30 ENCOUNTER — COMMUNICATION - HEALTHEAST (OUTPATIENT)
Dept: SCHEDULING | Facility: CLINIC | Age: 85
End: 2020-12-30

## 2020-12-30 DIAGNOSIS — Z20.822 EXPOSURE TO COVID-19 VIRUS: ICD-10-CM

## 2021-01-01 ENCOUNTER — COMMUNICATION - HEALTHEAST (OUTPATIENT)
Dept: SCHEDULING | Facility: CLINIC | Age: 86
End: 2021-01-01

## 2021-01-04 ENCOUNTER — COMMUNICATION - HEALTHEAST (OUTPATIENT)
Dept: ANTICOAGULATION | Facility: CLINIC | Age: 86
End: 2021-01-04

## 2021-01-04 ENCOUNTER — AMBULATORY - HEALTHEAST (OUTPATIENT)
Dept: LAB | Facility: CLINIC | Age: 86
End: 2021-01-04

## 2021-01-04 DIAGNOSIS — Z79.01 LONG TERM CURRENT USE OF ANTICOAGULANT THERAPY: ICD-10-CM

## 2021-01-04 DIAGNOSIS — I48.0 PAROXYSMAL ATRIAL FIBRILLATION (H): ICD-10-CM

## 2021-01-04 LAB — INR PPP: 3.2 (ref 0.9–1.1)

## 2021-01-13 ENCOUNTER — AMBULATORY - HEALTHEAST (OUTPATIENT)
Dept: LAB | Facility: CLINIC | Age: 86
End: 2021-01-13

## 2021-01-13 ENCOUNTER — RECORDS - HEALTHEAST (OUTPATIENT)
Dept: ADMINISTRATIVE | Facility: OTHER | Age: 86
End: 2021-01-13

## 2021-01-13 ENCOUNTER — RECORDS - HEALTHEAST (OUTPATIENT)
Dept: BONE DENSITY | Facility: CLINIC | Age: 86
End: 2021-01-13

## 2021-01-13 ENCOUNTER — COMMUNICATION - HEALTHEAST (OUTPATIENT)
Dept: ANTICOAGULATION | Facility: CLINIC | Age: 86
End: 2021-01-13

## 2021-01-13 DIAGNOSIS — M80.00XD AGE-RELATED OSTEOPOROSIS WITH CURRENT PATHOLOGICAL FRACTURE, UNSPECIFIED SITE, SUBSEQUENT ENCOUNTER FOR FRACTURE WITH ROUTINE HEALING: ICD-10-CM

## 2021-01-13 DIAGNOSIS — Z78.0 ASYMPTOMATIC MENOPAUSAL STATE: ICD-10-CM

## 2021-01-13 DIAGNOSIS — Z79.01 LONG TERM CURRENT USE OF ANTICOAGULANT THERAPY: ICD-10-CM

## 2021-01-13 DIAGNOSIS — I48.0 PAROXYSMAL ATRIAL FIBRILLATION (H): ICD-10-CM

## 2021-01-13 LAB — INR PPP: 2.7 (ref 0.9–1.1)

## 2021-01-20 ENCOUNTER — COMMUNICATION - HEALTHEAST (OUTPATIENT)
Dept: INTERNAL MEDICINE | Facility: CLINIC | Age: 86
End: 2021-01-20

## 2021-01-28 ENCOUNTER — COMMUNICATION - HEALTHEAST (OUTPATIENT)
Dept: INTERNAL MEDICINE | Facility: CLINIC | Age: 86
End: 2021-01-28

## 2021-01-28 DIAGNOSIS — Z86.711 HISTORY OF PULMONARY EMBOLUS (PE): ICD-10-CM

## 2021-01-28 DIAGNOSIS — Z79.01 LONG TERM CURRENT USE OF ANTICOAGULANT THERAPY: ICD-10-CM

## 2021-01-28 RX ORDER — WARFARIN SODIUM 2 MG/1
TABLET ORAL
Qty: 90 TABLET | Refills: 1 | Status: SHIPPED | OUTPATIENT
Start: 2021-01-28 | End: 2021-10-05

## 2021-02-04 ENCOUNTER — AMBULATORY - HEALTHEAST (OUTPATIENT)
Dept: LAB | Facility: CLINIC | Age: 86
End: 2021-02-04

## 2021-02-04 ENCOUNTER — COMMUNICATION - HEALTHEAST (OUTPATIENT)
Dept: ANTICOAGULATION | Facility: CLINIC | Age: 86
End: 2021-02-04

## 2021-02-04 DIAGNOSIS — Z79.01 LONG TERM CURRENT USE OF ANTICOAGULANT THERAPY: ICD-10-CM

## 2021-02-04 DIAGNOSIS — I48.0 PAROXYSMAL ATRIAL FIBRILLATION (H): ICD-10-CM

## 2021-02-04 LAB — INR PPP: 3.1 (ref 0.9–1.1)

## 2021-02-12 ENCOUNTER — AMBULATORY - HEALTHEAST (OUTPATIENT)
Dept: ANTICOAGULATION | Facility: CLINIC | Age: 86
End: 2021-02-12

## 2021-02-12 DIAGNOSIS — Z79.01 LONG TERM CURRENT USE OF ANTICOAGULANT THERAPY: ICD-10-CM

## 2021-02-18 ENCOUNTER — AMBULATORY - HEALTHEAST (OUTPATIENT)
Dept: LAB | Facility: CLINIC | Age: 86
End: 2021-02-18

## 2021-02-18 ENCOUNTER — COMMUNICATION - HEALTHEAST (OUTPATIENT)
Dept: ANTICOAGULATION | Facility: CLINIC | Age: 86
End: 2021-02-18

## 2021-02-18 DIAGNOSIS — Z79.01 LONG TERM CURRENT USE OF ANTICOAGULANT THERAPY: ICD-10-CM

## 2021-02-18 DIAGNOSIS — I48.0 PAROXYSMAL ATRIAL FIBRILLATION (H): ICD-10-CM

## 2021-02-18 LAB — INR PPP: 2.1 (ref 0.9–1.1)

## 2021-03-04 ENCOUNTER — AMBULATORY - HEALTHEAST (OUTPATIENT)
Dept: LAB | Facility: CLINIC | Age: 86
End: 2021-03-04

## 2021-03-04 ENCOUNTER — COMMUNICATION - HEALTHEAST (OUTPATIENT)
Dept: ANTICOAGULATION | Facility: CLINIC | Age: 86
End: 2021-03-04

## 2021-03-04 DIAGNOSIS — Z79.01 LONG TERM CURRENT USE OF ANTICOAGULANT THERAPY: ICD-10-CM

## 2021-03-04 DIAGNOSIS — I48.0 PAROXYSMAL ATRIAL FIBRILLATION (H): ICD-10-CM

## 2021-03-04 LAB — INR PPP: 1.6 (ref 0.9–1.1)

## 2021-03-18 ENCOUNTER — OFFICE VISIT - HEALTHEAST (OUTPATIENT)
Dept: INTERNAL MEDICINE | Facility: CLINIC | Age: 86
End: 2021-03-18

## 2021-03-18 ENCOUNTER — COMMUNICATION - HEALTHEAST (OUTPATIENT)
Dept: ANTICOAGULATION | Facility: CLINIC | Age: 86
End: 2021-03-18

## 2021-03-18 DIAGNOSIS — E83.42 HYPOMAGNESEMIA: ICD-10-CM

## 2021-03-18 DIAGNOSIS — Z79.01 LONG TERM CURRENT USE OF ANTICOAGULANT THERAPY: ICD-10-CM

## 2021-03-18 DIAGNOSIS — I10 ESSENTIAL HYPERTENSION: ICD-10-CM

## 2021-03-18 DIAGNOSIS — I49.5 TACHY-BRADY SYNDROME (H): ICD-10-CM

## 2021-03-18 DIAGNOSIS — M80.00XD AGE-RELATED OSTEOPOROSIS WITH CURRENT PATHOLOGICAL FRACTURE WITH ROUTINE HEALING: ICD-10-CM

## 2021-03-18 DIAGNOSIS — R60.0 PERIPHERAL EDEMA: ICD-10-CM

## 2021-03-18 DIAGNOSIS — I48.0 PAROXYSMAL ATRIAL FIBRILLATION (H): ICD-10-CM

## 2021-03-18 DIAGNOSIS — Z51.81 ENCOUNTER FOR THERAPEUTIC DRUG MONITORING: ICD-10-CM

## 2021-03-18 DIAGNOSIS — N18.31 STAGE 3A CHRONIC KIDNEY DISEASE (H): ICD-10-CM

## 2021-03-18 DIAGNOSIS — L98.9 FACIAL SKIN LESION: ICD-10-CM

## 2021-03-18 LAB
ANION GAP SERPL CALCULATED.3IONS-SCNC: 10 MMOL/L (ref 5–18)
BUN SERPL-MCNC: 29 MG/DL (ref 8–28)
CALCIUM SERPL-MCNC: 9 MG/DL (ref 8.5–10.5)
CHLORIDE BLD-SCNC: 103 MMOL/L (ref 98–107)
CO2 SERPL-SCNC: 32 MMOL/L (ref 22–31)
CREAT SERPL-MCNC: 1.08 MG/DL (ref 0.6–1.1)
ERYTHROCYTE [DISTWIDTH] IN BLOOD BY AUTOMATED COUNT: 14.9 % (ref 11–14.5)
GFR SERPL CREATININE-BSD FRML MDRD: 48 ML/MIN/1.73M2
GLUCOSE BLD-MCNC: 100 MG/DL (ref 70–125)
HCT VFR BLD AUTO: 38.5 % (ref 35–47)
HGB BLD-MCNC: 12.2 G/DL (ref 12–16)
INR PPP: 1.9 (ref 0.9–1.1)
MAGNESIUM SERPL-MCNC: 1.9 MG/DL (ref 1.8–2.6)
MCH RBC QN AUTO: 27.1 PG (ref 27–34)
MCHC RBC AUTO-ENTMCNC: 31.7 G/DL (ref 32–36)
MCV RBC AUTO: 85 FL (ref 80–100)
PLATELET # BLD AUTO: 229 THOU/UL (ref 140–440)
PMV BLD AUTO: 9 FL (ref 7–10)
POTASSIUM BLD-SCNC: 3.7 MMOL/L (ref 3.5–5)
RBC # BLD AUTO: 4.51 MILL/UL (ref 3.8–5.4)
SODIUM SERPL-SCNC: 145 MMOL/L (ref 136–145)
WBC: 7 THOU/UL (ref 4–11)

## 2021-03-18 ASSESSMENT — MIFFLIN-ST. JEOR: SCORE: 1199.52

## 2021-03-19 ENCOUNTER — COMMUNICATION - HEALTHEAST (OUTPATIENT)
Dept: INTERNAL MEDICINE | Facility: CLINIC | Age: 86
End: 2021-03-19

## 2021-04-05 ENCOUNTER — AMBULATORY - HEALTHEAST (OUTPATIENT)
Dept: LAB | Facility: CLINIC | Age: 86
End: 2021-04-05

## 2021-04-05 ENCOUNTER — COMMUNICATION - HEALTHEAST (OUTPATIENT)
Dept: ANTICOAGULATION | Facility: CLINIC | Age: 86
End: 2021-04-05

## 2021-04-05 DIAGNOSIS — Z79.01 LONG TERM CURRENT USE OF ANTICOAGULANT THERAPY: ICD-10-CM

## 2021-04-05 DIAGNOSIS — I48.0 PAROXYSMAL ATRIAL FIBRILLATION (H): ICD-10-CM

## 2021-04-05 LAB — INR PPP: 2.3 (ref 0.9–1.1)

## 2021-04-29 ENCOUNTER — COMMUNICATION - HEALTHEAST (OUTPATIENT)
Dept: ANTICOAGULATION | Facility: CLINIC | Age: 86
End: 2021-04-29

## 2021-04-29 ENCOUNTER — AMBULATORY - HEALTHEAST (OUTPATIENT)
Dept: LAB | Facility: CLINIC | Age: 86
End: 2021-04-29

## 2021-04-29 DIAGNOSIS — I48.0 PAROXYSMAL ATRIAL FIBRILLATION (H): ICD-10-CM

## 2021-04-29 DIAGNOSIS — Z79.01 LONG TERM CURRENT USE OF ANTICOAGULANT THERAPY: ICD-10-CM

## 2021-04-29 LAB — INR PPP: 2.2 (ref 0.9–1.1)

## 2021-05-27 ENCOUNTER — AMBULATORY - HEALTHEAST (OUTPATIENT)
Dept: LAB | Facility: CLINIC | Age: 86
End: 2021-05-27

## 2021-05-27 ENCOUNTER — COMMUNICATION - HEALTHEAST (OUTPATIENT)
Dept: ANTICOAGULATION | Facility: CLINIC | Age: 86
End: 2021-05-27

## 2021-05-27 VITALS
HEART RATE: 70 BPM | OXYGEN SATURATION: 99 % | TEMPERATURE: 97.7 F | DIASTOLIC BLOOD PRESSURE: 65 MMHG | SYSTOLIC BLOOD PRESSURE: 105 MMHG | RESPIRATION RATE: 18 BRPM

## 2021-05-27 DIAGNOSIS — I48.0 PAROXYSMAL ATRIAL FIBRILLATION (H): ICD-10-CM

## 2021-05-27 DIAGNOSIS — Z79.01 LONG TERM CURRENT USE OF ANTICOAGULANT THERAPY: ICD-10-CM

## 2021-05-27 LAB — INR PPP: 2.1 (ref 0.9–1.1)

## 2021-05-27 NOTE — TELEPHONE ENCOUNTER
Lab Results   Component Value Date    INR 2.10 (H) 04/03/2019    INR 2.70 (H) 02/21/2019    INR 2.40 (H) 01/16/2019       Patient's current Warfarin doses:    1 mg every Wed, Sat; 2 mg all other days       Next INR check is on 5/15/19      Patient's last OV with PCP was on 1/29/19    Warfarin prescription 3 month supply and one refill sent to patient's pharmacy today.    Ольга Forbes RN

## 2021-05-27 NOTE — TELEPHONE ENCOUNTER
ANTICOAGULATION  MANAGEMENT    Assessment     Today's INR result of 2.1 is Therapeutic (goal INR of 2.0-3.0)        Missed dose(s) 4/1 and patient reported that she took 2 mg this morning versus scheduled 1 mg dose may be affecting INR    No new diet changes affecting INR    No new medication/supplements affecting INR    Continues to tolerate warfarin with no reported s/s of bleeding or thromboembolism     Previous INR was Therapeutic    Plan:     Spoke with Ginna regarding INR result and instructed:   Patient already took 2 mg dose this morning.    Warfarin Dosing Instructions:  Continue current warfarin dose    1 mg every Wed, Sat; 2 mg all other days     (0 % change)    Instructed patient to follow up no later than: 4-6 weeks    Education provided: importance of therapeutic range and target INR goal and significance of current INR result    Ginna verbalizes understanding and agrees to warfarin dosing plan.    Instructed to call the [facility] for any changes, questions or concerns. (#[phone])   ?   Ольга Forbes RN    Subjective/Objective:      Ginna Coleann, a 84 y.o. female is on warfarin.     Ginna reports:     Home warfarin dose: as updated on anticoagulation calendar per template     Missed doses: Yes: 4/1/19     Medication changes:  No     S/S of bleeding or thromboembolism:  No     New Injury or illness:  No     Changes in diet or alcohol consumption:  No     Upcoming surgery, procedure or cardioversion:  No    Anticoagulation Episode Summary     Current INR goal:   2.0-3.0   TTR:   92.7 % (3.5 y)   Next INR check:   5/15/2019   INR from last check:   2.10 (4/3/2019)   Weekly max warfarin dose:      Target end date:      INR check location:      Preferred lab:      Send INR reminders to:   ANTICOAGULATION POOL C (DTN,VAD,CGR,GAV)    Indications    Long term current use of anticoagulant therapy [Z79.01]  DVT (deep venous thrombosis) (H) (Resolved) [I82.409]           Comments:             Anticoagulation Care Providers     Provider Role Specialty Phone number    Dawson Cloud MD Referring Internal Medicine [phone]

## 2021-05-27 NOTE — TELEPHONE ENCOUNTER
Refill Approved    Rx renewed per Medication Renewal Policy. Medication was last renewed on 1/15/19.    Debo Aguirre, [facility] Triage/Med Refill 4/17/2019     Requested Prescriptions   Pending Prescriptions Disp Refills     triamterene-hydrochlorothiazide (DYAZIDE) 37.5-25 mg per capsule [Pharmacy Med Name: TRIAMTERENE 37.5MG/ HCTZ 25MG CAPS] 180 capsule 0     Sig: TAKE TWO CAPSULES BY MOUTH EVERY DAY       Diuretics/Combination Diuretics Refill Protocol  Passed - 4/15/2019  9:42 AM        Passed - Visit with PCP or prescribing provider visit in past 12 months     Last office visit with prescriber/PCP: Visit date not found OR same dept: Visit date not found OR same specialty: Visit date not found  Last physical: 1/29/2019 Last MTM visit: Visit date not found   Next visit within 3 mo: Visit date not found  Next physical within 3 mo: Visit date not found  Prescriber OR PCP: Dawson Cloud MD  Last diagnosis associated with med order: 1. Essential hypertension  - triamterene-hydrochlorothiazide (DYAZIDE) 37.5-25 mg per capsule [Pharmacy Med Name: TRIAMTERENE 37.5MG/ HCTZ 25MG CAPS]; TAKE TWO CAPSULES BY MOUTH EVERY DAY  Dispense: 180 capsule; Refill: 0    If protocol passes may refill for 12 months if within 3 months of last provider visit (or a total of 15 months).             Passed - Serum Potassium in past 12 months      Lab Results   Component Value Date    Potassium 3.8 01/29/2019             Passed - Serum Sodium in past 12 months      Lab Results   Component Value Date    Sodium 141 01/29/2019             Passed - Blood pressure on file in past 12 months     BP Readings from Last 1 Encounters:   01/29/19 156/86             Passed - Serum Creatinine in past 12 months      Creatinine   Date Value Ref Range Status   01/29/2019 1.29 (H) 0.60 - 1.10 mg/dL Final

## 2021-05-27 NOTE — TELEPHONE ENCOUNTER
Who is calling:  Patient Ginna  Reason for Call:  Returning your call.  Patient is home now. Please call her back. Thanks.  Date of last appointment with primary care: 04-03-19  Okay to leave a detailed message: Yes

## 2021-05-27 NOTE — TELEPHONE ENCOUNTER
RN cannot approve Refill Request    RN can NOT refill this medication med is not covered by policy/route to provider.       Debo Aguirre, [facility] Triage/Med Refill 4/3/2019    Requested Prescriptions   Pending Prescriptions Disp Refills     warfarin (COUMADIN/JANTOVEN) 2 MG tablet [Pharmacy Med Name: WARFARIN     TAB 2MG TABLET] 80 tablet 1     Sig: TAKE ONE-HALF TO ONE TABLET (1-2MG) ONCE DAILY AS DIRECTED ADJUST PER INR RESULTS    Warfarin Refill Protocol  Failed - 4/2/2019 11:37 AM       Failed -  Route to appropriate pool/provider    Last Anticoagulation Summary:   Anticoagulation Episode Summary     Current INR goal:   2.0-3.0   TTR:   92.7 % (3.5 y)   Next INR check:   5/15/2019   INR from last check:   2.10 (4/3/2019)   Weekly max warfarin dose:      Target end date:      INR check location:      Preferred lab:      Send INR reminders to:   ANTICOAGULATION POOL C (DTN,VAD,CGR,GAV)    Indications    Long term current use of anticoagulant therapy [Z79.01]  DVT (deep venous thrombosis) (H) (Resolved) [I82.409]           Comments:            Anticoagulation Care Providers     Provider Role Specialty Phone number    Pedro LuisDawson MD Referring Internal Medicine [phone]               Passed - Provider visit in last year    Last office visit with prescriber/PCP: Visit date not found OR same dept: Visit date not found OR same specialty: Visit date not found  Last physical: Visit date not found Last MTM visit: Visit date not found    Next appt within 3 mo: Visit date not found Next physical within 3 mo: Visit date not found  Prescriber OR PCP: Feliz Cardenas MD  Last diagnosis associated with med order: 1. Long term current use of anticoagulant therapy  - warfarin (COUMADIN/JANTOVEN) 2 MG tablet [Pharmacy Med Name: WARFARIN     TAB 2MG TABLET]; TAKE ONE-HALF TO ONE TABLET (1-2MG) ONCE DAILY AS DIRECTED ADJUST PER INR RESULTS  Dispense: 80 tablet; Refill: 1    2. History of pulmonary embolus  (PE)  - warfarin (COUMADIN/JANTOVEN) 2 MG tablet [Pharmacy Med Name: WARFARIN     TAB 2MG TABLET]; TAKE ONE-HALF TO ONE TABLET (1-2MG) ONCE DAILY AS DIRECTED ADJUST PER INR RESULTS  Dispense: 80 tablet; Refill: 1    If protocol passes may refill for 6 months if within 3 months of last provider visit (or a total of 9 months).

## 2021-05-28 NOTE — TELEPHONE ENCOUNTER
ANTICOAGULATION  MANAGEMENT    Assessment     Today's INR result of 2.3 is Therapeutic (goal INR of 2.0-3.0)        Warfarin taken as previously instructed    No new diet changes affecting INR     OV today with PCP with no new med changes noted.    No new medication/supplements affecting INR    Continues to tolerate warfarin with no reported s/s of bleeding or thromboembolism     Previous INR was Therapeutic    Plan:     Spoke with Ginna regarding INR result and instructed:     Warfarin Dosing Instructions:  Continue current warfarin dose    1 mg every Wed, Sat; 2 mg all other days      (0 % change)    Instructed patient to follow up no later than: 6 weeks.Patient prefers to call to make appointment.    Education provided: importance of therapeutic range and target INR goal and significance of current INR result    Ginna verbalizes understanding and agrees to warfarin dosing plan.    Instructed to call the [facility] for any changes, questions or concerns. (#[phone])   ?   Ольга Forbes RN    Subjective/Objective:      Ginna Quiroga, a 84 y.o. female is on warfarin.     Ginna reports:     Home warfarin dose: as updated on anticoagulation calendar per template     Missed doses: No     Medication changes:  No     S/S of bleeding or thromboembolism:  No     New Injury or illness:  No     Changes in diet or alcohol consumption:  No     Upcoming surgery, procedure or cardioversion:  No    Anticoagulation Episode Summary     Current INR goal:   2.0-3.0   TTR:   92.8 % (3.6 y)   Next INR check:   6/10/2019   INR from last check:   2.30 (4/29/2019)   Weekly max warfarin dose:      Target end date:      INR check location:      Preferred lab:      Send INR reminders to:   ANTICOAGULATION POOL C (DTN,VAD,CGR,GAV)    Indications    Long term current use of anticoagulant therapy [Z79.01]  DVT (deep venous thrombosis) (H) (Resolved) [I82.409]           Comments:            Anticoagulation Care Providers      Provider Role Specialty Phone number    Dawson Cloud MD Referring Internal Medicine [phone]

## 2021-05-28 NOTE — PROGRESS NOTES
Office Visit - Follow Up   Ginna Quiroga   84 y.o. female    Date of Visit: 4/29/2019    Chief Complaint   Patient presents with     Hypertension     Follow-up      patient states No new complaints        Assessment and Plan   1. Essential hypertension  Blood pressure under much better control with increased dose of enalapril 10 mg twice daily.  We will continue this along with verapamil, Toprol-XL, and Dyazide.  - Basic Metabolic Panel    2. History of pulmonary embolus (PE)  Remains on long-term anticoagulation with warfarin.  Rechecking INR today.        Return in about 1 year (around 4/29/2020) for Annual physical.     History of Present Illness   This 84 y.o. old woman with hypertension and history of pulmonary embolus chronically anticoagulated with warfarin here to follow-up as blood pressure was not adequately controlled at her annual physical earlier this year.  At that time, recommended to cut back on sodium intake including potato chips and her enalapril was increased to 10 mg twice daily.  She remains on same doses of Toprol-XL, verapamil, and Dyazide.  Tolerating increased dose of ACE inhibitor without side effects.  No chronic cough.  Remains anticoagulated with warfarin.  No bleeding problems.    Review of Systems: No change in chronic edema      Medications, Allergies and Problem List   Patient Active Problem List   Diagnosis     Long term current use of anticoagulant therapy     Essential hypertension     Osteoarthritis of multiple joints     Glaucoma     History of pulmonary embolus (PE)     Family history of colon cancer     Peripheral edema     Osteopenia     Vitamin D deficiency     Left foot pain       She has a past surgical history that includes pr discission,2nd cataract,laser (Left, 2/3/2016) and pr discission,2nd cataract,laser (Right, 2/17/2016).    No Known Allergies    Current Outpatient Medications   Medication Sig Dispense Refill     enalapril (VASOTEC) 10 MG tablet Take 1  "tablet (10 mg total) by mouth 2 (two) times a day. 180 tablet 3     latanoprost (XALATAN) 0.005 % ophthalmic solution        metoprolol succinate (TOPROL-XL) 100 MG 24 hr tablet TAKE ONE TABLET BY MOUTH ONCE DAILY 90 tablet 4     triamterene-hydrochlorothiazide (DYAZIDE) 37.5-25 mg per capsule TAKE TWO CAPSULES BY MOUTH EVERY  capsule 2     verapamil (CALAN-SR) 240 MG CR tablet Take 1 tablet (240 mg total) by mouth at bedtime. 90 tablet 3     warfarin (COUMADIN/JANTOVEN) 2 MG tablet TAKE ONE-HALF TO ONE TABLET (1-2MG) ONCE DAILY AS DIRECTED ADJUST PER INR RESULTS 80 tablet 1     No current facility-administered medications for this visit.         Physical Exam   General Appearance:   Well-appearing elderly woman    /70 (Patient Site: Left Arm, Patient Position: Sitting, Cuff Size: Adult Large)   Pulse (!) 50   Ht 5' 4.5\" (1.638 m)   Wt 177 lb (80.3 kg)   SpO2 99%   BMI 29.91 kg/m          1+ bilateral lower extremity edema     Additional Information   Social History     Tobacco Use     Smoking status: Never Smoker     Smokeless tobacco: Never Used   Substance Use Topics     Alcohol use: Yes     Drug use: Not on file              Dawson Cloud MD  "

## 2021-05-29 NOTE — TELEPHONE ENCOUNTER
ANTICOAGULATION  MANAGEMENT    Assessment     Today's INR result of 2.3 is Therapeutic (goal INR of 2.0-3.0)        Warfarin taken as previously instructed    No new diet changes affecting INR    No new medication/supplements affecting INR    Continues to tolerate warfarin with no reported s/s of bleeding or thromboembolism     Previous INR was Therapeutic    Plan:     Spoke with Ginna regarding INR result and instructed:     Warfarin Dosing Instructions:  Continue current warfarin dose    1 mg every Wed, Sat; 2 mg all other days      (0 % change)    Instructed patient to follow up no later than: 6 weeks. Prefers to call to make appointment.    Education provided: importance of therapeutic range, target INR goal and significance of current INR result and importance of notifying clinic for changes in medications    Ginna verbalizes understanding and agrees to warfarin dosing plan.    Instructed to call the [facility] for any changes, questions or concerns. (#[phone])   ?   Ольга Forbes RN    Subjective/Objective:      Ginna Coleann, a 84 y.o. female is on warfarin.     Ginna reports:     Home warfarin dose: as updated on anticoagulation calendar per template     Missed doses: No     Medication changes:  No     S/S of bleeding or thromboembolism:  No     New Injury or illness:  No     Changes in diet or alcohol consumption:  No     Upcoming surgery, procedure or cardioversion:  No    Anticoagulation Episode Summary     Current INR goal:   2.0-3.0   TTR:   93.0 % (3.7 y)   Next INR check:   7/17/2019   INR from last check:   2.30 (6/5/2019)   Weekly max warfarin dose:      Target end date:      INR check location:      Preferred lab:      Send INR reminders to:   ANTICOAGULATION POOL C (DTN,VAD,CGR,GAV)    Indications    Long term current use of anticoagulant therapy [Z79.01]  DVT (deep venous thrombosis) (H) (Resolved) [I82.409]           Comments:            Anticoagulation Care Providers      Provider Role Specialty Phone number    Dawson Cloud MD Referring Internal Medicine [phone]

## 2021-05-30 NOTE — TELEPHONE ENCOUNTER
ANTICOAGULATION  MANAGEMENT    Assessment     Today's INR result of 2.5 is Therapeutic (goal INR of 2.0-3.0)        Warfarin taken as previously instructed    No new diet changes affecting INR    No new medication/supplements affecting INR    Continues to tolerate warfarin with no reported s/s of bleeding or thromboembolism     Previous INR was Therapeutic    Plan:     Left a detailed message for Ginna regarding INR result and instructed:     Warfarin Dosing Instructions:  Continue current warfarin dose    1 mg every Wed, Sat; 2 mg all other days      (0 % change)    Instructed patient to follow up no later than: 6 weeks.    Education provided: importance of therapeutic range and target INR goal and significance of current INR result        Instructed to call the [facility] for any changes, questions or concerns. (#[phone])   ?   Ольга Forbes RN    Subjective/Objective:      Ginnakayli Quiroga, a 84 y.o. female is on warfarin.     Ginna reports:     Home warfarin dose: as updated on anticoagulation calendar per template     Missed doses: No     Medication changes:  No     S/S of bleeding or thromboembolism:  No     New Injury or illness:  No     Changes in diet or alcohol consumption:  No     Upcoming surgery, procedure or cardioversion:  No    Anticoagulation Episode Summary     Current INR goal:   2.0-3.0   TTR:   93.2 % (3.8 y)   Next INR check:   8/27/2019   INR from last check:   2.50 (7/16/2019)   Weekly max warfarin dose:      Target end date:      INR check location:      Preferred lab:      Send INR reminders to:   ANTICOAGULATION POOL C (DTN,VAD,CGR,GAV)    Indications    Long term current use of anticoagulant therapy [Z79.01]  DVT (deep venous thrombosis) (H) (Resolved) [I82.409]           Comments:            Anticoagulation Care Providers     Provider Role Specialty Phone number    Dawson Cloud MD Referring Internal Medicine [phone]

## 2021-05-31 VITALS — WEIGHT: 178 LBS | HEIGHT: 65 IN | BODY MASS INDEX: 29.66 KG/M2

## 2021-05-31 NOTE — TELEPHONE ENCOUNTER
ANTICOAGULATION  MANAGEMENT    Assessment     Today's INR result of 2.5 is Therapeutic (goal INR of 2.0-3.0)        Warfarin taken as previously instructed    No new diet changes affecting INR    No new medication/supplements affecting INR    Continues to tolerate warfarin with no reported s/s of bleeding or thromboembolism     Previous INR was Therapeutic    Plan:     Spoke with Ginna regarding INR result and instructed:     Warfarin Dosing Instructions:  Continue current warfarin dose    1 mg every Wed, Sat; 2 mg all other days     (0 % change)    Instructed patient to follow up no later than: 6-8 weeks    Education provided: importance of therapeutic range, target INR goal and significance of current INR result and importance of notifying clinic for changes in medications    Ginna verbalizes understanding and agrees to warfarin dosing plan.    Instructed to call the [facility] for any changes, questions or concerns. (#[phone])   ?   Ольга Forbes RN    Subjective/Objective:      Ginna Quiroga, a 84 y.o. female is on warfarin.     Ginna reports:     Home warfarin dose: as updated on anticoagulation calendar per template     Missed doses: No     Medication changes:  No     S/S of bleeding or thromboembolism:  No     New Injury or illness:  No     Changes in diet or alcohol consumption:  No     Upcoming surgery, procedure or cardioversion:  No    Anticoagulation Episode Summary     Current INR goal:   2.0-3.0   TTR:   95.7 %   Next INR check:   10/10/2019   INR from last check:   2.50 (8/29/2019)   Weekly max warfarin dose:      Target end date:      INR check location:      Preferred lab:      Send INR reminders to:   ANTICOAGULATION POOL C (DTN,VAD,CGR,GAV)    Indications    Long term current use of anticoagulant therapy [Z79.01]  DVT (deep venous thrombosis) (H) (Resolved) [I82.409]           Comments:            Anticoagulation Care Providers     Provider Role Specialty Phone number     Dawson Cloud MD [facility] Internal Medicine [phone]

## 2021-06-02 VITALS — HEIGHT: 65 IN | WEIGHT: 178 LBS | BODY MASS INDEX: 29.66 KG/M2

## 2021-06-02 NOTE — TELEPHONE ENCOUNTER
RN cannot approve Refill Request    RN can NOT refill this medication Protocol failed and NO refill given.       Debo Aguirre, [facility] Triage/Med Refill 10/17/2019    Requested Prescriptions   Pending Prescriptions Disp Refills     warfarin (COUMADIN/JANTOVEN) 2 MG tablet 80 tablet 1     Sig: TAKE ONE-HALF TO ONE TABLET (1-2MG) ONCE DAILY AS DIRECTED ADJUST PER INR RESULTS       Warfarin Refill Protocol  Failed - 10/17/2019  3:34 PM        Failed -  Route to appropriate pool/provider     Last Anticoagulation Summary:   Anticoagulation Episode Summary     Current INR goal:   2.0-3.0   TTR:   88.5 %   Next INR check:   10/23/2019   INR from last check:   3.80! (10/9/2019)   Weekly max warfarin dose:      Target end date:      INR check location:      Preferred lab:      Send INR reminders to:   [facility]    Indications    Long term current use of anticoagulant therapy [Z79.01]  DVT (deep venous thrombosis) (H) (Resolved) [I82.409]           Comments:            Anticoagulation Care Providers     Provider Role Specialty Phone number    Dawson Cloud MD Referring Internal Medicine [phone]                Passed - Provider visit in last year     Last office visit with prescriber/PCP: 4/29/2019 Dawson Cloud MD OR same dept: 4/29/2019 Dawson Cloud MD OR same specialty: 4/29/2019 Dawson Cloud MD  Last physical: 1/29/2019 Last MTM visit: Visit date not found    Next appt within 3 mo: Visit date not found Next physical within 3 mo: Visit date not found  Prescriber OR PCP: Dawson Cloud MD  Last diagnosis associated with med order: 1. Long term current use of anticoagulant therapy  - warfarin (COUMADIN/JANTOVEN) 2 MG tablet; TAKE ONE-HALF TO ONE TABLET (1-2MG) ONCE DAILY AS DIRECTED ADJUST PER INR RESULTS  Dispense: 80 tablet; Refill: 1    2. History of pulmonary embolus (PE)  - warfarin (COUMADIN/JANTOVEN) 2 MG tablet; TAKE ONE-HALF TO ONE TABLET (1-2MG)  ONCE DAILY AS DIRECTED ADJUST PER INR RESULTS  Dispense: 80 tablet; Refill: 1    If protocol passes may refill for 6 months if within 3 months of last provider visit (or a total of 9 months).

## 2021-06-02 NOTE — TELEPHONE ENCOUNTER
Lab Results   Component Value Date    INR 3.80 (H) 10/09/2019    INR 2.50 (H) 08/29/2019    INR 2.50 (H) 07/16/2019       Patient's current Warfarin doses:    1 mg every Wed, Sat; 2 mg all other days            Next INR check is on 10/23      Patient's last OV with PCP was on 4/29/19    Warfarin prescription 3 month supply and one refill sent to patient's pharmacy today.    Ольга Forbes RN

## 2021-06-02 NOTE — TELEPHONE ENCOUNTER
ANTICOAGULATION  MANAGEMENT    Assessment     Today's INR result of 3.8 is Supratherapeutic (goal INR of 2.0-3.0)        Warfarin taken as previously instructed    Change in alcohol intake and less greens may be affecting INR - due to celebrated her birthday over the weekend.    No new medication/supplements affecting INR    Continues to tolerate warfarin with no reported s/s of bleeding or thromboembolism     Previous INR was Therapeutic    Plan:     Spoke with Ginna regarding INR result and instructed:   Patient reported that she already took her warfarin dose today.    Warfarin Dosing Instructions:  hold warfarin dose tomorrow then continue current warfarin dose    1 mg every Wed, Sat; 2 mg all other days     (0 % change)    Instructed patient to follow up no later than: 1-2 weeks - patient prefers to call to make appointment.    Education provided: impact of vitamin K foods on INR, potential interaction between warfarin and alcohol, importance of therapeutic range, target INR goal and significance of current INR result and monitoring for bleeding signs and symptoms    Ginna verbalizes understanding and agrees to warfarin dosing plan.    Instructed to call the [facility] for any changes, questions or concerns. (#[phone])   ?   Ольга Forbes RN    Subjective/Objective:      Ginna Coleann, a 85 y.o. female is on warfarin.     Ginna reports:     Home warfarin dose: as updated on anticoagulation calendar per template     Missed doses: No     Medication changes:  No     S/S of bleeding or thromboembolism:  No     New Injury or illness:  No     Changes in diet or alcohol consumption:  Yes: see above     Upcoming surgery, procedure or cardioversion:  No    Anticoagulation Episode Summary     Current INR goal:   2.0-3.0   TTR:   88.8 %   Next INR check:   10/23/2019   INR from last check:   3.80! (10/9/2019)   Weekly max warfarin dose:      Target end date:      INR check location:      Preferred lab:       Send INR reminders to:   [facility]    Indications    Long term current use of anticoagulant therapy [Z79.01]  DVT (deep venous thrombosis) (H) (Resolved) [I82.409]           Comments:            Anticoagulation Care Providers     Provider Role Specialty Phone number    Dawson Cloud MD Referring Internal Medicine [phone]

## 2021-06-02 NOTE — TELEPHONE ENCOUNTER
ANTICOAGULATION  MANAGEMENT    Assessment     Today's INR result of 2.6 is Therapeutic (goal INR of 2.0-3.0)        Warfarin taken as previously instructed    No new diet changes affecting INR    No new medication/supplements affecting INR    Continues to tolerate warfarin with no reported s/s of bleeding or thromboembolism     Previous INR was Supratherapeutic most likely due to change in alcohol and less greens.    Plan:     Spoke with Ginna regarding INR result and instructed:     Warfarin Dosing Instructions:  Continue current warfarin dose    1 mg every Wed, Sat; 2 mg all other days     (0 % change)    Instructed patient to follow up no later than: 2 weeks, prefers to call to make appointment.    Education provided: importance of therapeutic range and target INR goal and significance of current INR result    Ginna verbalizes understanding and agrees to warfarin dosing plan.    Instructed to call the [facility] for any changes, questions or concerns. (#[phone])   ?   Ольга Forbes RN    Subjective/Objective:      Ginna Coleann, a 85 y.o. female is on warfarin.     Ginna reports:     Home warfarin dose: as updated on anticoagulation calendar per template     Missed doses: No     Medication changes:  No     S/S of bleeding or thromboembolism:  No     New Injury or illness:  No     Changes in diet or alcohol consumption:  No     Upcoming surgery, procedure or cardioversion:  No    Anticoagulation Episode Summary     Current INR goal:   2.0-3.0   TTR:   86.2 %   Next INR check:   11/6/2019   INR from last check:   2.60 (10/23/2019)   Weekly max warfarin dose:      Target end date:      INR check location:      Preferred lab:      Send INR reminders to:   [facility]    Indications    Long term current use of anticoagulant therapy [Z79.01]  DVT (deep venous thrombosis) (H) (Resolved) [I82.409]           Comments:            Anticoagulation Care Providers     Provider Role Specialty  Phone number    Dawson Cloud MD Referring Internal Medicine [phone]

## 2021-06-03 VITALS — HEIGHT: 65 IN | BODY MASS INDEX: 29.49 KG/M2 | WEIGHT: 177 LBS

## 2021-06-03 NOTE — TELEPHONE ENCOUNTER
ANTICOAGULATION  MANAGEMENT    Assessment     Today's INR result of 2.5 is Therapeutic (goal INR of 2.0-3.0)        Warfarin taken as previously instructed    No new diet changes affecting INR    No new medication/supplements affecting INR    Continues to tolerate warfarin with no reported s/s of bleeding or thromboembolism     Previous INR was Therapeutic    Plan:     Spoke with Ginna regarding INR result and instructed:     Warfarin Dosing Instructions:  Continue current warfarin dose    1 mg every Wed, Sat; 2 mg all other days     (0 % change)    Instructed patient to follow up no later than: 4 weeks - patient prefers to call to make appointment.      Education provided: importance of therapeutic range and target INR goal and significance of current INR result    Ginna verbalizes understanding and agrees to warfarin dosing plan.    Instructed to call the [facility] for any changes, questions or concerns. (#[phone])   ?   Ольга Forbes RN    Subjective/Objective:      Ginna Quiroga, a 85 y.o. female is on warfarin.     Ginna reports:     Home warfarin dose: as updated on anticoagulation calendar per template     Missed doses: No     Medication changes:  No     S/S of bleeding or thromboembolism:  No     New Injury or illness:  No     Changes in diet or alcohol consumption:  No     Upcoming surgery, procedure or cardioversion:  No    Anticoagulation Episode Summary     Current INR goal:   2.0-3.0   TTR:   86.2 %   Next INR check:   12/4/2019   INR from last check:   2.50 (11/6/2019)   Weekly max warfarin dose:      Target end date:      INR check location:      Preferred lab:      Send INR reminders to:   [facility]    Indications    Long term current use of anticoagulant therapy [Z79.01]  DVT (deep venous thrombosis) (H) (Resolved) [I82.409]           Comments:            Anticoagulation Care Providers     Provider Role Specialty Phone number    Dawson Cloud MD Referring  Internal Medicine [phone]

## 2021-06-04 VITALS — WEIGHT: 175 LBS | BODY MASS INDEX: 29.57 KG/M2

## 2021-06-04 NOTE — TELEPHONE ENCOUNTER
ANTICOAGULATION  MANAGEMENT    Assessment     Today's INR result of 3.4 is Supratherapeutic (goal INR of 2.0-3.0)        Warfarin taken as previously instructed    Change in alcohol intake may be affecting INR for the holiday.    No new medication/supplements affecting INR    Continues to tolerate warfarin with no reported s/s of bleeding or thromboembolism     Previous INR was Therapeutic    Plan:     Spoke with Ginna regarding INR result and instructed:     Warfarin Dosing Instructions:  take scheduled 1 mg today then one time lower dose of 1 mg tomorrow then continue current warfarin dose    1 mg every Wed, Sat; 2 mg all other days     (0 % change)    Instructed patient to follow up no later than: 2 weeks - patient prefers to call to make appointment    Education provided: potential interaction between warfarin and alcohol, importance of therapeutic range and target INR goal and significance of current INR result    Ginna verbalizes understanding and agrees to warfarin dosing plan.    Instructed to call the [facility] for any changes, questions or concerns. (#[phone])   ?   Ольга Forbes RN    Subjective/Objective:      Ginna Quiroga, a 85 y.o. female is on warfarin.     Ginna reports:     Home warfarin dose: as updated on anticoagulation calendar per template     Missed doses: No     Medication changes:  No     S/S of bleeding or thromboembolism:  No     New Injury or illness:  No     Changes in diet or alcohol consumption:  Yes: holiday     Upcoming surgery, procedure or cardioversion:  No    Anticoagulation Episode Summary     Current INR goal:   2.0-3.0   TTR:   87.1 % (1 y)   Next INR check:   12/18/2019   INR from last check:   3.40! (12/4/2019)   Weekly max warfarin dose:      Target end date:      INR check location:      Preferred lab:      Send INR reminders to:   [facility]    Indications    Long term current use of anticoagulant therapy [Z79.01]  DVT (deep venous  thrombosis) (H) (Resolved) [I82.409]           Comments:            Anticoagulation Care Providers     Provider Role Specialty Phone number    Dawson Cloud MD Referring Internal Medicine [phone]

## 2021-06-04 NOTE — TELEPHONE ENCOUNTER
Anticoagulation Annual Referral Renewal Review    Ginna Quiroga's chart reviewed for annual renewal of referral to anticoagulation monitoring.        Criteria for anticoagulation nurse and/or pharmacist renewal met   Warfarin indication: PE Yes , DVT/PE with previous provider documentation patient to be on extended anticoagulation   Current with INR monitoring/compliant Yes Yes   Date of last office visit 4/29/19 Yes, had office visit within last year   Time in Therapeutic Range (TTR) 85 % Yes, TTR > 60%       Ginna Quiroga met all criteria for anticoagulation management program initiated renewal.  New INR standing orders and anticoagulation referral renewal placed.      Ольга Forbes RN  9:26 AM

## 2021-06-04 NOTE — TELEPHONE ENCOUNTER
ANTICOAGULATION  MANAGEMENT    Assessment     Today's INR result of 2.6 is Therapeutic (goal INR of 2.0-3.0)        Warfarin taken as previously instructed    No new diet changes affecting INR    No new medication/supplements affecting INR    Continues to tolerate warfarin with no reported s/s of bleeding or thromboembolism     Previous INR was Supratherapeutic - most likely due to interaction with warfarin and alcohol    Plan:     Spoke with Ginna regarding INR result and instructed:     Warfarin Dosing Instructions:  Continue current warfarin dose    1 mg every Wed, Sat; 2 mg all other days     (0 % change)    Instructed patient to follow up no later than: 2-3 weeks    Education provided: impact of vitamin K foods on INR, importance of therapeutic range and target INR goal and significance of current INR result    Ginna verbalizes understanding and agrees to warfarin dosing plan.    Instructed to call the [facility] for any changes, questions or concerns. (#[phone])   ?   Ольга Forbes RN    Subjective/Objective:      Ginna Coleann, a 85 y.o. female is on warfarin.     Ginna reports:     Home warfarin dose: as updated on anticoagulation calendar per template     Missed doses: No     Medication changes:  No     S/S of bleeding or thromboembolism:  No     New Injury or illness:  No     Changes in diet or alcohol consumption:  No     Upcoming surgery, procedure or cardioversion:  No    Anticoagulation Episode Summary     Current INR goal:   2.0-3.0   TTR:   85.1 % (1 y)   Next INR check:   1/9/2020   INR from last check:   2.60 (12/19/2019)   Weekly max warfarin dose:      Target end date:      INR check location:      Preferred lab:      Send INR reminders to:   [facility]    Indications    Long term current use of anticoagulant therapy [Z79.01]  DVT (deep venous thrombosis) (H) (Resolved) [I82.409]           Comments:            Anticoagulation Care Providers     Provider Role  Specialty Phone number    Dawson Cloud MD Referring Internal Medicine [phone]

## 2021-06-05 VITALS
SYSTOLIC BLOOD PRESSURE: 138 MMHG | TEMPERATURE: 97.7 F | WEIGHT: 169 LBS | HEART RATE: 67 BPM | DIASTOLIC BLOOD PRESSURE: 80 MMHG | OXYGEN SATURATION: 100 % | HEIGHT: 65 IN | BODY MASS INDEX: 28.16 KG/M2

## 2021-06-05 VITALS
SYSTOLIC BLOOD PRESSURE: 130 MMHG | RESPIRATION RATE: 18 BRPM | OXYGEN SATURATION: 97 % | HEART RATE: 70 BPM | BODY MASS INDEX: 28.7 KG/M2 | TEMPERATURE: 98.2 F | DIASTOLIC BLOOD PRESSURE: 80 MMHG | WEIGHT: 169.8 LBS

## 2021-06-05 VITALS
BODY MASS INDEX: 29.74 KG/M2 | TEMPERATURE: 96.9 F | WEIGHT: 176 LBS | RESPIRATION RATE: 18 BRPM | SYSTOLIC BLOOD PRESSURE: 122 MMHG | DIASTOLIC BLOOD PRESSURE: 69 MMHG | OXYGEN SATURATION: 97 % | HEART RATE: 70 BPM

## 2021-06-05 VITALS
SYSTOLIC BLOOD PRESSURE: 124 MMHG | DIASTOLIC BLOOD PRESSURE: 79 MMHG | RESPIRATION RATE: 17 BRPM | WEIGHT: 169.8 LBS | OXYGEN SATURATION: 99 % | TEMPERATURE: 97.7 F | HEART RATE: 73 BPM | BODY MASS INDEX: 28.7 KG/M2

## 2021-06-05 VITALS
WEIGHT: 169.8 LBS | OXYGEN SATURATION: 97 % | HEART RATE: 70 BPM | RESPIRATION RATE: 18 BRPM | BODY MASS INDEX: 28.7 KG/M2 | DIASTOLIC BLOOD PRESSURE: 80 MMHG | SYSTOLIC BLOOD PRESSURE: 130 MMHG | TEMPERATURE: 98.2 F

## 2021-06-05 VITALS
BODY MASS INDEX: 29.98 KG/M2 | SYSTOLIC BLOOD PRESSURE: 126 MMHG | TEMPERATURE: 97.5 F | HEART RATE: 73 BPM | DIASTOLIC BLOOD PRESSURE: 79 MMHG | RESPIRATION RATE: 18 BRPM | WEIGHT: 177.4 LBS | OXYGEN SATURATION: 95 %

## 2021-06-05 VITALS
HEART RATE: 70 BPM | DIASTOLIC BLOOD PRESSURE: 72 MMHG | SYSTOLIC BLOOD PRESSURE: 134 MMHG | RESPIRATION RATE: 18 BRPM | BODY MASS INDEX: 29.17 KG/M2 | TEMPERATURE: 97.2 F | WEIGHT: 172.6 LBS | OXYGEN SATURATION: 96 %

## 2021-06-05 VITALS
WEIGHT: 172.6 LBS | RESPIRATION RATE: 18 BRPM | HEART RATE: 70 BPM | BODY MASS INDEX: 29.17 KG/M2 | SYSTOLIC BLOOD PRESSURE: 134 MMHG | OXYGEN SATURATION: 96 % | DIASTOLIC BLOOD PRESSURE: 72 MMHG | TEMPERATURE: 97.2 F

## 2021-06-05 VITALS
DIASTOLIC BLOOD PRESSURE: 79 MMHG | SYSTOLIC BLOOD PRESSURE: 146 MMHG | WEIGHT: 172 LBS | RESPIRATION RATE: 17 BRPM | BODY MASS INDEX: 29.07 KG/M2 | OXYGEN SATURATION: 97 % | TEMPERATURE: 97.7 F | HEART RATE: 70 BPM

## 2021-06-05 VITALS
HEART RATE: 73 BPM | WEIGHT: 142.2 LBS | SYSTOLIC BLOOD PRESSURE: 103 MMHG | OXYGEN SATURATION: 96 % | BODY MASS INDEX: 24.03 KG/M2 | DIASTOLIC BLOOD PRESSURE: 61 MMHG | TEMPERATURE: 98.7 F | RESPIRATION RATE: 18 BRPM

## 2021-06-05 VITALS
WEIGHT: 171.8 LBS | DIASTOLIC BLOOD PRESSURE: 88 MMHG | OXYGEN SATURATION: 99 % | BODY MASS INDEX: 29.03 KG/M2 | SYSTOLIC BLOOD PRESSURE: 154 MMHG | RESPIRATION RATE: 18 BRPM | TEMPERATURE: 97.1 F | HEART RATE: 73 BPM

## 2021-06-05 VITALS
HEART RATE: 68 BPM | DIASTOLIC BLOOD PRESSURE: 80 MMHG | BODY MASS INDEX: 27.49 KG/M2 | SYSTOLIC BLOOD PRESSURE: 132 MMHG | OXYGEN SATURATION: 95 % | WEIGHT: 165 LBS | HEIGHT: 65 IN

## 2021-06-05 NOTE — TELEPHONE ENCOUNTER
ANTICOAGULATION  MANAGEMENT    Assessment     Today's INR result of 2.3 is Therapeutic (goal INR of 2.0-3.0)        Warfarin taken as previously instructed    No new diet changes affecting INR    No new medication/supplements affecting INR    Continues to tolerate warfarin with no reported s/s of bleeding or thromboembolism     Previous INR was Therapeutic    Plan:     Spoke with Ginna regarding INR result and instructed:     Warfarin Dosing Instructions:  Continue current warfarin dose    1 mg every Wed, Sat; 2 mg all other days     (0 % change)    Instructed patient to follow up no later than: 4 weeks - prefers to call to make appointment.    Education provided: importance of therapeutic range and target INR goal and significance of current INR result    Ginna verbalizes understanding and agrees to warfarin dosing plan.    Instructed to call the [facility] for any changes, questions or concerns. (#[phone])   ?   Ольга Forbes RN    Subjective/Objective:      Ginna Coleann, a 85 y.o. female is on warfarin.     Ginna reports:     Home warfarin dose: as updated on anticoagulation calendar per template     Missed doses: No     Medication changes:  No     S/S of bleeding or thromboembolism:  No     New Injury or illness:  No     Changes in diet or alcohol consumption:  No     Upcoming surgery, procedure or cardioversion:  No    Anticoagulation Episode Summary     Current INR goal:   2.0-3.0   TTR:   85.1 % (1 y)   Next INR check:   2/5/2020   INR from last check:   2.30 (1/8/2020)   Weekly max warfarin dose:      Target end date:      INR check location:      Preferred lab:      Send INR reminders to:   [facility]    Indications    Long term current use of anticoagulant therapy [Z79.01]  DVT (deep venous thrombosis) (H) (Resolved) [I82.409]           Comments:            Anticoagulation Care Providers     Provider Role Specialty Phone number    Dawson Cloud MD Referring  Internal Medicine [phone]

## 2021-06-05 NOTE — TELEPHONE ENCOUNTER
Has set an appointment for March.  Refill Request  Did you contact pharmacy: Yes  Medication name:   Requested Prescriptions     Pending Prescriptions Disp Refills     triamterene-hydrochlorothiazide (DYAZIDE) 37.5-25 mg per capsule 180 capsule 2     Sig: Take 2 capsules by mouth daily.     Who prescribed the medication: Dawson Cloud MD  [facility]ed [facility]  Is patient out of medication: No.  2 days left  Patient notified refills processed in 3 business days:  no  Okay to leave a detailed message: yes

## 2021-06-05 NOTE — TELEPHONE ENCOUNTER
ANTICOAGULATION  MANAGEMENT    Assessment     Today's INR result of 2.6 is Therapeutic (goal INR of 2.0-3.0)        Warfarin taken as previously instructed    No new diet changes affecting INR    No new medication/supplements affecting INR    Continues to tolerate warfarin with no reported s/s of bleeding or thromboembolism     Previous INR was Therapeutic    Plan:     Spoke with Ginna regarding INR result and instructed:     Warfarin Dosing Instructions:  Continue current warfarin dose    1 mg every Wed, Sat; 2 mg all other days       (0 % change)    Instructed patient to follow up no later than: 4-6 weeks    Education provided: importance of therapeutic range and target INR goal and significance of current INR result    Ginna verbalizes understanding and agrees to warfarin dosing plan.    Instructed to call the [facility] for any changes, questions or concerns. (#[phone])   ?   Ольга Forbes RN    Subjective/Objective:      Ginna Quiroga, a 85 y.o. female is on warfarin.     Ginna reports:     Home warfarin dose: as updated on anticoagulation calendar per template     Missed doses: No     Medication changes:  No     S/S of bleeding or thromboembolism:  No     New Injury or illness:  No     Changes in diet or alcohol consumption:  No     Upcoming surgery, procedure or cardioversion:  No    Anticoagulation Episode Summary     Current INR goal:   2.0-3.0   TTR:   85.1 % (1 y)   Next INR check:   3/18/2020   INR from last check:   2.60 (2/5/2020)   Weekly max warfarin dose:      Target end date:      INR check location:      Preferred lab:      Send INR reminders to:   [facility]    Indications    Long term current use of anticoagulant therapy [Z79.01]  DVT (deep venous thrombosis) (H) (Resolved) [I82.409]           Comments:            Anticoagulation Care Providers     Provider Role Specialty Phone number    Dawson Cloud MD Referring Internal Medicine [phone]

## 2021-06-05 NOTE — TELEPHONE ENCOUNTER
Refill Approved    Rx renewed per Medication Renewal Policy. Medication was last renewed on 4/17/2019 with 2 refills.  Last office visit: 4/29/2019 with PCP Dr LEONIDAS Cloud     Agnesnorm Burns, [facility] Triage/Med Refill 1/15/2020     Requested Prescriptions   Pending Prescriptions Disp Refills     triamterene-hydrochlorothiazide (DYAZIDE) 37.5-25 mg per capsule 180 capsule 2     Sig: Take 2 capsules by mouth daily.       Diuretics/Combination Diuretics Refill Protocol  Passed - 1/15/2020  2:40 PM        Passed - Visit with PCP or prescribing provider visit in past 12 months     Last office visit with prescriber/PCP: 4/29/2019 Dawson Cloud MD OR same dept: 4/29/2019 Dawson Cloud MD OR same specialty: 4/29/2019 Dawson Cloud MD  Last physical: 1/29/2019 Last MTM visit: Visit date not found   Next visit within 3 mo: Visit date not found  Next physical within 3 mo: Visit date not found  Prescriber OR PCP: Dawson Cloud MD  Last diagnosis associated with med order: 1. Essential hypertension  - triamterene-hydrochlorothiazide (DYAZIDE) 37.5-25 mg per capsule; Take 2 capsules by mouth daily.  Dispense: 180 capsule; Refill: 2    If protocol passes may refill for 12 months if within 3 months of last provider visit (or a total of 15 months).             Passed - Serum Potassium in past 12 months      Lab Results   Component Value Date    Potassium 4.5 06/05/2019             Passed - Serum Sodium in past 12 months      Lab Results   Component Value Date    Sodium 141 06/05/2019             Passed - Blood pressure on file in past 12 months     BP Readings from Last 1 Encounters:   04/29/19 132/70             Passed - Serum Creatinine in past 12 months      Creatinine   Date Value Ref Range Status   06/05/2019 1.49 (H) 0.60 - 1.10 mg/dL Final

## 2021-06-06 NOTE — TELEPHONE ENCOUNTER
Refill Approved    Rx renewed per Medication Renewal Policy. Medication was last renewed on 2/20/19.3/6/19.    Debo Aguirre, [facility] Triage/Med Refill 3/3/2020     Requested Prescriptions   Pending Prescriptions Disp Refills     verapamiL (CALAN-SR) 240 MG CR tablet [Pharmacy Med Name: VERAPAMIL SR 240MG TAB TABLET] 90 tablet 3     Sig: TAKE 1 TABLET (240 MG TOTAL) BY MOUTH AT BEDTIME.       Calcium-Channel Blockers Protocol Passed - 3/2/2020 11:25 AM        Passed - PCP or prescribing provider visit in past 12 months or next 3 months     Last office visit with prescriber/PCP: 4/29/2019 Dawson Cloud MD OR same dept: 4/29/2019 Dawson Cloud MD OR same specialty: 4/29/2019 Dawson Cloud MD  Last physical: 1/29/2019 Last MTM visit: Visit date not found   Next visit within 3 mo: Visit date not found  Next physical within 3 mo: Visit date not found  Prescriber OR PCP: Dawson Cloud MD  Last diagnosis associated with med order: 1. HTN (hypertension)  - verapamiL (CALAN-SR) 240 MG CR tablet [Pharmacy Med Name: VERAPAMIL SR 240MG TAB TABLET]; Take 1 tablet (240 mg total) by mouth at bedtime.  Dispense: 90 tablet; Refill: 3    2. Hypertension  - metoprolol succinate (TOPROL-XL) 100 MG 24 hr tablet [Pharmacy Med Name: METOPROL SUC TAB 100MG ER TABLET]; TAKE ONE TABLET BY MOUTH ONCE DAILY  Dispense: 90 tablet; Refill: 4    If protocol passes may refill for 12 months if within 3 months of last provider visit (or a total of 15 months).             Passed - Blood pressure filed in past 12 months     BP Readings from Last 1 Encounters:   04/29/19 132/70             metoprolol succinate (TOPROL-XL) 100 MG 24 hr tablet [Pharmacy Med Name: METOPROL SUC TAB 100MG ER TABLET] 90 tablet 4     Sig: TAKE ONE TABLET BY MOUTH ONCE DAILY       Beta-Blockers Refill Protocol Passed - 3/2/2020 11:25 AM        Passed - PCP or prescribing provider visit in past 12 months or next 3 months     Last office  visit with prescriber/PCP: 4/29/2019 Dawson Cloud MD OR same dept: 4/29/2019 Dawson Cloud MD OR same specialty: 4/29/2019 Dawson Cloud MD  Last physical: 1/29/2019 Last MTM visit: Visit date not found   Next visit within 3 mo: Visit date not found  Next physical within 3 mo: Visit date not found  Prescriber OR PCP: Dawson Cloud MD  Last diagnosis associated with med order: 1. HTN (hypertension)  - verapamiL (CALAN-SR) 240 MG CR tablet [Pharmacy Med Name: VERAPAMIL SR 240MG TAB TABLET]; Take 1 tablet (240 mg total) by mouth at bedtime.  Dispense: 90 tablet; Refill: 3    2. Hypertension  - metoprolol succinate (TOPROL-XL) 100 MG 24 hr tablet [Pharmacy Med Name: METOPROL SUC TAB 100MG ER TABLET]; TAKE ONE TABLET BY MOUTH ONCE DAILY  Dispense: 90 tablet; Refill: 4    If protocol passes may refill for 12 months if within 3 months of last provider visit (or a total of 15 months).             Passed - Blood pressure filed in past 12 months     BP Readings from Last 1 Encounters:   04/29/19 132/70

## 2021-06-06 NOTE — TELEPHONE ENCOUNTER
Refill Approved    Rx renewed per Medication Renewal Policy. Medication was last renewed on 1/29/19.    Deob Aguirre, [facility] Triage/Med Refill 2/20/2020     Requested Prescriptions   Pending Prescriptions Disp Refills     enalapril (VASOTEC) 10 MG tablet [Pharmacy Med Name: ENALAPRIL 10MG TABLETS] 180 tablet 3     Sig: TAKE 1 TABLET(10 MG) BY MOUTH TWICE DAILY       Ace Inhibitors Refill Protocol Passed - 2/17/2020 10:51 AM        Passed - PCP or prescribing provider visit in past 12 months       Last office visit with prescriber/PCP: 4/29/2019 Dawson Cloud MD OR same dept: 4/29/2019 Dawson Cloud MD OR same specialty: 4/29/2019 Dawson Cloud MD  Last physical: 1/29/2019 Last MTM visit: Visit date not found   Next visit within 3 mo: Visit date not found  Next physical within 3 mo: Visit date not found  Prescriber OR PCP: Dawson Cloud MD  Last diagnosis associated with med order: 1. Essential hypertension  - enalapril (VASOTEC) 10 MG tablet [Pharmacy Med Name: ENALAPRIL 10MG TABLETS]; TAKE 1 TABLET(10 MG) BY MOUTH TWICE DAILY  Dispense: 180 tablet; Refill: 3    If protocol passes may refill for 12 months if within 3 months of last provider visit (or a total of 15 months).             Passed - Serum Potassium in past 12 months     Lab Results   Component Value Date    Potassium 4.5 06/05/2019             Passed - Blood pressure filed in past 12 months     BP Readings from Last 1 Encounters:   04/29/19 132/70             Passed - Serum Creatinine in past 12 months     Creatinine   Date Value Ref Range Status   06/05/2019 1.49 (H) 0.60 - 1.10 mg/dL Final

## 2021-06-07 NOTE — TELEPHONE ENCOUNTER
ANTICOAGULATION  MANAGEMENT    Assessment     Today's INR result of 2.3 is Therapeutic (goal INR of 2.0-3.0)        Warfarin taken as previously instructed    No new diet changes affecting INR    No new medication/supplements affecting INR    Continues to tolerate warfarin with no reported s/s of bleeding or thromboembolism     Previous INR was Therapeutic    Plan:     Spoke with Ginna regarding INR result and instructed:     Warfarin Dosing Instructions:  Continue current warfarin dose    1 mg every Wed, Sat; 2 mg all other days     (0 % change)    Instructed patient to follow up no later than: 6-8 weeks.    Education provided: importance of therapeutic range and target INR goal and significance of current INR result    Ginna verbalizes understanding and agrees to warfarin dosing plan.    Instructed to call the [facility] for any changes, questions or concerns. (#[phone])   ?   Ольга Forbes RN    Subjective/Objective:      Ginna Quiroga, a 85 y.o. female is on warfarin.     Ginna reports:     Home warfarin dose: as updated on anticoagulation calendar per template     Missed doses: No     Medication changes:  No     S/S of bleeding or thromboembolism:  No     New Injury or illness:  No     Changes in diet or alcohol consumption:  No     Upcoming surgery, procedure or cardioversion:  No    Anticoagulation Episode Summary     Current INR goal:   2.0-3.0   TTR:   85.1 % (1 y)   Next INR check:   6/17/2020   INR from last check:   2.30 (4/22/2020)   Weekly max warfarin dose:      Target end date:      INR check location:      Preferred lab:      Send INR reminders to:   [facility]    Indications    Long term current use of anticoagulant therapy [Z79.01]  DVT (deep venous thrombosis) (H) (Resolved) [I82.409]           Comments:            Anticoagulation Care Providers     Provider Role Specialty Phone number    Dawson Cloud MD Referring Internal Medicine [phone]

## 2021-06-08 NOTE — TELEPHONE ENCOUNTER
RN cannot approve Refill Request    RN can NOT refill this medication Protocol failed and NO refill given.        Debo Aguirre, [facility] Triage/Med Refill 5/28/2020    Requested Prescriptions   Pending Prescriptions Disp Refills     metoprolol succinate (TOPROL-XL) 100 MG 24 hr tablet [Pharmacy Med Name: METOPROL SUC TAB 100MG ER TABLET] 90 tablet 3     Sig: TAKE ONE TABLET BY MOUTH ONCE DAILY       Beta-Blockers Refill Protocol Failed - 5/26/2020  1:22 PM        Failed - PCP or prescribing provider visit in past 12 months or next 3 months     Last office visit with prescriber/PCP: 4/29/2019 Dawson Cloud MD OR same dept: Visit date not found OR same specialty: 4/29/2019 Dawson Cloud MD  Last physical: 1/29/2019 Last MTM visit: Visit date not found   Next visit within 3 mo: Visit date not found  Next physical within 3 mo: Visit date not found  Prescriber OR PCP: Dawson Cloud MD  Last diagnosis associated with med order: 1. Hypertension  - metoprolol succinate (TOPROL-XL) 100 MG 24 hr tablet [Pharmacy Med Name: METOPROL SUC TAB 100MG ER TABLET]; TAKE ONE TABLET BY MOUTH ONCE DAILY  Dispense: 90 tablet; Refill: 0    2. Long term current use of anticoagulant therapy  - warfarin ANTICOAGULANT (COUMADIN/JANTOVEN) 2 MG tablet [Pharmacy Med Name: WARFARIN     TAB 2MG TABLET]; TAKE ONE-HALF TO ONE TABLET (1-2MG) ONCE DAILY AS DIRECTED ADJUST PER INR RESULTS  Dispense: 90 tablet; Refill: 1    3. History of pulmonary embolus (PE)  - warfarin ANTICOAGULANT (COUMADIN/JANTOVEN) 2 MG tablet [Pharmacy Med Name: WARFARIN     TAB 2MG TABLET]; TAKE ONE-HALF TO ONE TABLET (1-2MG) ONCE DAILY AS DIRECTED ADJUST PER INR RESULTS  Dispense: 90 tablet; Refill: 1    4. HTN (hypertension)  - verapamiL (CALAN-SR) 240 MG CR tablet [Pharmacy Med Name: VERAPAMIL SR 240MG TAB TABLET]; TAKE 1 TABLET (240 MG TOTAL) BY MOUTH AT BEDTIME.  Dispense: 90 tablet; Refill: 0    If protocol passes may refill for 12 months if  within 3 months of last provider visit (or a total of 15 months).             Failed - Blood pressure filed in past 12 months     BP Readings from Last 1 Encounters:   04/29/19 132/70                warfarin ANTICOAGULANT (COUMADIN/JANTOVEN) 2 MG tablet [Pharmacy Med Name: WARFARIN     TAB 2MG TABLET] 90 tablet 1     Sig: TAKE ONE-HALF TO ONE TABLET (1-2MG) ONCE DAILY AS DIRECTED ADJUST PER INR RESULTS       Warfarin Refill Protocol  Failed - 5/26/2020  1:22 PM        Failed - Provider visit in last year     Last office visit with prescriber/PCP: 4/29/2019 Dawson Cloud MD OR same dept: Visit date not found OR same specialty: 4/29/2019 Dawson Cloud MD  Last physical: 1/29/2019 Last MTM visit: Visit date not found    Next appt within 3 mo: Visit date not found Next physical within 3 mo: Visit date not found  Prescriber OR PCP: Dawson Cloud MD  Last diagnosis associated with med order: 1. Hypertension  - metoprolol succinate (TOPROL-XL) 100 MG 24 hr tablet [Pharmacy Med Name: METOPROL SUC TAB 100MG ER TABLET]; TAKE ONE TABLET BY MOUTH ONCE DAILY  Dispense: 90 tablet; Refill: 0    2. Long term current use of anticoagulant therapy  - warfarin ANTICOAGULANT (COUMADIN/JANTOVEN) 2 MG tablet [Pharmacy Med Name: WARFARIN     TAB 2MG TABLET]; TAKE ONE-HALF TO ONE TABLET (1-2MG) ONCE DAILY AS DIRECTED ADJUST PER INR RESULTS  Dispense: 90 tablet; Refill: 1    3. History of pulmonary embolus (PE)  - warfarin ANTICOAGULANT (COUMADIN/JANTOVEN) 2 MG tablet [Pharmacy Med Name: WARFARIN     TAB 2MG TABLET]; TAKE ONE-HALF TO ONE TABLET (1-2MG) ONCE DAILY AS DIRECTED ADJUST PER INR RESULTS  Dispense: 90 tablet; Refill: 1    4. HTN (hypertension)  - verapamiL (CALAN-SR) 240 MG CR tablet [Pharmacy Med Name: VERAPAMIL SR 240MG TAB TABLET]; TAKE 1 TABLET (240 MG TOTAL) BY MOUTH AT BEDTIME.  Dispense: 90 tablet; Refill: 0    If protocol passes may refill for 6 months if within 3 months of last provider visit (or a  total of 9 months).          Failed -  Route to appropriate pool/provider     Last Anticoagulation Summary:   Anticoagulation Episode Summary     Current INR goal:   2.0-3.0   TTR:   83.4 % (11 mo)   Next INR check:   6/17/2020   INR from last check:   2.30 (4/22/2020)   Weekly max warfarin dose:      Target end date:      INR check location:      Preferred lab:      Send INR reminders to:   [facility] [place]    Indications    Long term current use of anticoagulant therapy [Z79.01]  DVT (deep venous thrombosis) (H) (Resolved) [I82.409]           Comments:            Anticoagulation Care Providers     Provider Role Specialty Phone number    Dawson Cloud MD Referring Internal Medicine [phone]                   verapamiL (CALAN-SR) 240 MG CR tablet [Pharmacy Med Name: VERAPAMIL SR 240MG TAB TABLET] 90 tablet 3     Sig: TAKE 1 TABLET (240 MG TOTAL) BY MOUTH AT BEDTIME.       Calcium-Channel Blockers Protocol Failed - 5/26/2020  1:22 PM        Failed - PCP or prescribing provider visit in past 12 months or next 3 months     Last office visit with prescriber/PCP: 4/29/2019 Dawson Colud MD OR same dept: Visit date not found OR same specialty: 4/29/2019 Dawson Cloud MD  Last physical: 1/29/2019 Last MTM visit: Visit date not found   Next visit within 3 mo: Visit date not found  Next physical within 3 mo: Visit date not found  Prescriber OR PCP: Dawson Cloud MD  Last diagnosis associated with med order: 1. Hypertension  - metoprolol succinate (TOPROL-XL) 100 MG 24 hr tablet [Pharmacy Med Name: METOPROL SUC TAB 100MG ER TABLET]; TAKE ONE TABLET BY MOUTH ONCE DAILY  Dispense: 90 tablet; Refill: 0    2. Long term current use of anticoagulant therapy  - warfarin ANTICOAGULANT (COUMADIN/JANTOVEN) 2 MG tablet [Pharmacy Med Name: WARFARIN     TAB 2MG TABLET]; TAKE ONE-HALF TO ONE TABLET (1-2MG) ONCE DAILY AS DIRECTED ADJUST PER INR RESULTS  Dispense: 90 tablet; Refill: 1    3.  History of pulmonary embolus (PE)  - warfarin ANTICOAGULANT (COUMADIN/JANTOVEN) 2 MG tablet [Pharmacy Med Name: WARFARIN     TAB 2MG TABLET]; TAKE ONE-HALF TO ONE TABLET (1-2MG) ONCE DAILY AS DIRECTED ADJUST PER INR RESULTS  Dispense: 90 tablet; Refill: 1    4. HTN (hypertension)  - verapamiL (CALAN-SR) 240 MG CR tablet [Pharmacy Med Name: VERAPAMIL SR 240MG TAB TABLET]; TAKE 1 TABLET (240 MG TOTAL) BY MOUTH AT BEDTIME.  Dispense: 90 tablet; Refill: 0    If protocol passes may refill for 12 months if within 3 months of last provider visit (or a total of 15 months).             Failed - Blood pressure filed in past 12 months     BP Readings from Last 1 Encounters:   04/29/19 132/70

## 2021-06-08 NOTE — PROGRESS NOTES
Assessment and Plan:       1. Medicare annual wellness visit, subsequent  Immunizations are reviewed and recommending Shingrix which can be obtained at her pharmacy.  She now has a living will.  Non-smoker.  Uses alcohol in moderation.  Regular exercise discussed.  She declines having further colonoscopies  not unreasonable at her age.  Recommending annual mammogram and will help schedule.  Last DEXA was 2018 and this should be repeated in 2023. Dementia and depression screening completed.  She will need to complete clock drawing at her return later this year.  She sees an ophthalmologist every year and gets glaucoma screening.  Skin skin exam to be performed at her return visit and recommending regular use of sunblock.  Will screen for diabetes with fasting glucose.  Checking fasting lipid profile.     2. Essential hypertension  Continues on enalapril, metoprolol and verapamil along with Dyazide.  Blood pressure will be checked at her upcoming lab appointment.  - Comprehensive Metabolic Panel; Future  - HM2(CBC w/o Differential); Future    3. Vitamin D deficiency  Monitor vitamin D level  - Vitamin D, Total (25-Hydroxy); Future    4. Encounter for screening mammogram for malignant neoplasm of breast    - Mammo Screening Bilateral; Future    5. Encounter for screening for lipoid disorders    - Lipid [place], FASTING; Future        The patient's current medical problems and family history were reviewed.    I have had an Advance Directives discussion with the patient.  The following health maintenance schedule was reviewed with the patient and provided in printed form in the after visit summary:   Health Maintenance   Topic Date Due     ZOSTER VACCINES (2 of 3) 02/26/2009     MEDICARE ANNUAL WELLNESS VISIT  01/29/2020     DXA SCAN  03/22/2020     FALL RISK ASSESSMENT  06/05/2021     TD 18+ HE  01/01/2023     ADVANCE CARE PLANNING  01/29/2024     PNEUMOCOCCAL IMMUNIZATION 65+ LOW/MEDIUM RISK  Completed     INFLUENZA  VACCINE RULE BASED  Completed        Subjective:   Chief Complaint: Ginna Quiroga is an 85 y.o. female here for an Annual Wellness visit.   HPI: Annual wellness visit completed today    Review of Systems:    Please see above.  The rest of the review of systems are negative for all systems.    Patient Care Team:  Dawson Cloud MD as PCP - General (Internal Medicine)  Dawson Cloud MD as Assigned PCP     Patient Active Problem List   Diagnosis     Long term current use of anticoagulant therapy     Essential hypertension     Osteoarthritis of multiple joints     Glaucoma     History of pulmonary embolus (PE)     Family history of colon cancer     Peripheral edema     Osteopenia     Vitamin D deficiency     Left foot pain     Past Medical History:   Diagnosis Date     Disorder of ligament of foot 2016     Essential hypertension 2016     Family history of colon cancer     Declines having colonoscopy     Glaucoma      History of pulmonary embolus (PE) 2016    Factor V Leiden mutation, family history pulmonary embolus     Osteoarthritis of multiple joints 2016     Osteopenia     DXA 2018 T score -1.3, previous DXA normal      Peripheral edema 1/15/2018     Polyarthritis rheumatica (H)      Vitamin D deficiency       Past Surgical History:   Procedure Laterality Date     IA DISCISSION,2ND CATARACT,LASER Left 2/3/2016    Procedure: LASER YAG CAPSULOTOMY, LEFT;  Surgeon: Jitendra Rick MD;  Location: [facility];  Service: Ophthalmology     IA DISCISSION,2ND CATARACT,LASER Right 2016    Procedure: LASER YAG CAPSULOTOMY, RIGHT;  Surgeon: Jitendra Rick MD;  Location: [facility];  Service: Ophthalmology      Family History   Problem Relation Age of Onset     Pulmonary embolism Mother          at age 51     Heart attack Father          in his 70s     Colon cancer Brother              Kidney failure Sister      Prostate cancer Son      Hodgkin's  lymphoma Son       Social History     Socioeconomic History     Marital status:      Spouse name: Not on file     Number of children: Not on file     Years of education: Not on file     Highest education level: Not on file   Occupational History     Not on file   Social Needs     Financial resource strain: Not on file     Food insecurity     Worry: Not on file     Inability: Not on file     Transportation needs     Medical: Not on file     Non-medical: Not on file   Tobacco Use     Smoking status: Never Smoker     Smokeless tobacco: Never Used   Substance and Sexual Activity     Alcohol use: Yes     Drug use: Not on file     Sexual activity: Not on file   Lifestyle     Physical activity     Days per week: Not on file     Minutes per session: Not on file     Stress: Not on file   Relationships     Social connections     Talks on phone: Not on file     Gets together: Not on file     Attends Jew service: Not on file     Active member of club or organization: Not on file     Attends meetings of clubs or organizations: Not on file     Relationship status: Not on file     Intimate partner violence     Fear of current or ex partner: Not on file     Emotionally abused: Not on file     Physically abused: Not on file     Forced sexual activity: Not on file   Other Topics Concern     Not on file   Social History Narrative     2001  [place]    6 sons and multiple grandchildren, also has great grandchildren.      Current Outpatient Medications   Medication Sig Dispense Refill     enalapril (VASOTEC) 10 MG tablet TAKE 1 TABLET(10 MG) BY MOUTH TWICE DAILY 180 tablet 3     latanoprost (XALATAN) 0.005 % ophthalmic solution        metoprolol succinate (TOPROL-XL) 100 MG 24 hr tablet TAKE ONE TABLET BY MOUTH ONCE DAILY 90 tablet 3     triamterene-hydrochlorothiazide (DYAZIDE) 37.5-25 mg per capsule TAKE 2 CAPSULES BY MOUTH DAILY 180 capsule 0     verapamiL (CALAN-SR) 240 MG CR tablet TAKE 1 TABLET (240 MG  TOTAL) BY MOUTH AT BEDTIME. 90 tablet 3     warfarin ANTICOAGULANT (COUMADIN/JANTOVEN) 2 MG tablet TAKE ONE-HALF TO ONE TABLET (1-2MG) ONCE DAILY AS DIRECTED ADJUST PER INR RESULTS 90 tablet 1     No current facility-administered medications for this visit.       Objective:   Vital Signs:   Visit Vitals  Wt 175 lb (79.4 kg)   BMI 29.57 kg/m         Weight: Provided by patient  Height: Provided by patient  BMI: Provided by patient  Blood Pressure: Unable to obtain due to video visit      VisionScreening:  No exam data present     PHYSICAL EXAM      Assessment Results 6/5/2020   Activities of Daily Living No help needed   Instrumental Activities of Daily Living No help needed   Get Up and Go Score -   Mini Cog Total Score -   Some recent data might be hidden     A Mini-Cog score of 0-2 suggests the possibility of dementia, score of 3-5 suggests no dementia  Able to remember 3 of 3 words.  Clock drawing will be completed at her return  Identified Health Risks:     She is at risk for lack of exercise and has been provided with information to increase physical activity for the benefit of her well-being.  Patient's advanced directive was discussed and I am comfortable with the patient's wishes.

## 2021-06-08 NOTE — TELEPHONE ENCOUNTER
Lab Results   Component Value Date    INR 2.30 (H) 04/22/2020    INR 2.90 (H) 03/11/2020    INR 2.60 (H) 02/05/2020       Patient's current Warfarin doses:     1 mg every Wed, Sat; 2 mg all other days           Next INR check is on 6/17/2020      Patient's last OV with PCP was on scheduled for annual visit with PCP on 7/17    Warfarin prescription 3 month supply and one refill sent to patient's pharmacy today.    Ольга Forbes RN

## 2021-06-08 NOTE — TELEPHONE ENCOUNTER
ANTICOAGULATION  MANAGEMENT    Assessment     Today's INR result of 2.6 is Therapeutic (goal INR of 2.0-3.0)        Warfarin taken as previously instructed    No new diet changes affecting INR    No new medication/supplements affecting INR    Continues to tolerate warfarin with no reported s/s of bleeding or thromboembolism     Previous INR was Therapeutic    Plan:     Spoke with Ginna regarding INR result and instructed:     Warfarin Dosing Instructions:  Continue current warfarin dose    1 mg every Wed, Sat; 2 mg all other days      (0 % change)    Instructed patient to follow up no later than: 8 weeks    Education provided: importance of following up for INR monitoring at instructed interval and importance of notifying clinic for changes in medications    Ginna verbalizes understanding and agrees to warfarin dosing plan.    Instructed to call the [facility] for any changes, questions or concerns. (#[phone])   ?   Ольга Forbes RN    Subjective/Objective:      Ginna Quiroga, a 85 y.o. female is on warfarin.     Ginna reports:     Home warfarin dose: as updated on anticoagulation calendar per template     Missed doses: No     Medication changes:  No     S/S of bleeding or thromboembolism:  No     New Injury or illness:  No     Changes in diet or alcohol consumption:  No     Upcoming surgery, procedure or cardioversion:  No    Anticoagulation Episode Summary     Current INR goal:   2.0-3.0   TTR:   85.1 % (1 y)   Next INR check:   8/4/2020   INR from last check:   2.60 (6/9/2020)   Weekly max warfarin dose:      Target end date:      INR check location:      Preferred lab:      Send INR reminders to:   [facility]    Indications    Long term current use of anticoagulant therapy [Z79.01]  DVT (deep venous thrombosis) (H) (Resolved) [I82.409]           Comments:            Anticoagulation Care Providers     Provider Role Specialty Phone number    Dawson Cloud MD Referring  Internal Medicine [phone]

## 2021-06-08 NOTE — TELEPHONE ENCOUNTER
Please have Inge set her up for a 40-minute annual wellness visit by video or telephone visit in the next 4 weeks.  She should keep her appointment in July and that will be a 40-minute follow-up visit of medical problems.  I will send a refill of her prescription

## 2021-06-08 NOTE — PROGRESS NOTES
"Ginna Quiroga is a 85 y.o. female who is being evaluated via a billable telephone visit.      The patient has been notified of following:     \"This telephone visit will be conducted via a call between you and your physician/provider. We have found that certain health care needs can be provided without the need for a physical exam.  This service lets us provide the care you need with a short phone conversation.  If a prescription is necessary we can send it directly to your pharmacy.  If lab work is needed we can place an order for that and you can then stop by our lab to have the test done at a later time.    Telephone visits are billed at different rates depending on your insurance coverage. During this emergency period, for some insurers they may be billed the same as an in-person visit.  Please reach out to your insurance provider with any questions.    If during the course of the call the physician/provider feels a telephone visit is not appropriate, you will not be charged for this service.\"    Patient has given verbal consent to a Telephone visit? Yes    What phone number would you like to be contacted at? [phone]        Additional provider notes: Annual wellness visit completed today.  See separate dictation          Phone call duration:  13 minutes    Dawson Cloud MD    "

## 2021-06-08 NOTE — TELEPHONE ENCOUNTER
RN cannot approve Refill Request    RN can NOT refill this medication Protocol failed and NO refill given.       Debo Aguirre, [facility] Triage/Med Refill 5/27/2020    Requested Prescriptions   Pending Prescriptions Disp Refills     enalapril (VASOTEC) 10 MG tablet [Pharmacy Med Name: ENALAPRIL 10MG TABLETS] 180 tablet 3     Sig: TAKE 1 TABLET(10 MG) BY MOUTH TWICE DAILY       Ace Inhibitors Refill Protocol Failed - 5/25/2020  4:27 PM        Failed - PCP or prescribing provider visit in past 12 months       Last office visit with prescriber/PCP: 4/29/2019 Dawson Cloud MD OR same dept: Visit date not found OR same specialty: 4/29/2019 Dawson Cloud MD  Last physical: 1/29/2019 Last MTM visit: Visit date not found   Next visit within 3 mo: Visit date not found  Next physical within 3 mo: Visit date not found  Prescriber OR PCP: Dawson Cloud MD  Last diagnosis associated with med order: 1. Essential hypertension  - enalapril (VASOTEC) 10 MG tablet [Pharmacy Med Name: ENALAPRIL 10MG TABLETS]; TAKE 1 TABLET(10 MG) BY MOUTH TWICE DAILY  Dispense: 180 tablet; Refill: 0    If protocol passes may refill for 12 months if within 3 months of last provider visit (or a total of 15 months).             Failed - Blood pressure filed in past 12 months     BP Readings from Last 1 Encounters:   04/29/19 132/70             Passed - Serum Potassium in past 12 months     Lab Results   Component Value Date    Potassium 4.5 06/05/2019             Passed - Serum Creatinine in past 12 months     Creatinine   Date Value Ref Range Status   06/05/2019 1.49 (H) 0.60 - 1.10 mg/dL Final

## 2021-06-09 NOTE — TELEPHONE ENCOUNTER
RN cannot approve Refill Request    RN can NOT refill this medication Protocol failed and NO refill given.     Debo Aguirre, [facility] Triage/Med Refill 6/30/2020    Requested Prescriptions   Pending Prescriptions Disp Refills     triamterene-hydrochlorothiazide (DYAZIDE) 37.5-25 mg per capsule [Pharmacy Med Name: TRIAMTERENE 37.5MG/ HCTZ 25MG CAPS] 180 capsule 3     Sig: TAKE 2 CAPSULES BY MOUTH DAILY       Diuretics/Combination Diuretics Refill Protocol  Failed - 6/29/2020 11:53 AM        Failed - Visit with PCP or prescribing provider visit in past 12 months     Last office visit with prescriber/PCP: 4/29/2019 Dawson Cloud MD OR same dept: Visit date not found OR same specialty: 4/29/2019 Dawson Cloud MD  Last physical: 1/29/2019 Last MTM visit: Visit date not found   Next visit within 3 mo: Visit date not found  Next physical within 3 mo: Visit date not found  Prescriber OR PCP: Dawson Cloud MD  Last diagnosis associated with med order: 1. Essential hypertension  - triamterene-hydrochlorothiazide (DYAZIDE) 37.5-25 mg per capsule [Pharmacy Med Name: TRIAMTERENE 37.5MG/ HCTZ 25MG CAPS]; TAKE 2 CAPSULES BY MOUTH DAILY  Dispense: 180 capsule; Refill: 0    If protocol passes may refill for 12 months if within 3 months of last provider visit (or a total of 15 months).             Failed - Blood pressure on file in past 12 months     BP Readings from Last 1 Encounters:   04/29/19 132/70             Passed - Serum Potassium in past 12 months      Lab Results   Component Value Date    Potassium 3.9 06/09/2020             Passed - Serum Sodium in past 12 months      Lab Results   Component Value Date    Sodium 140 06/09/2020             Passed - Serum Creatinine in past 12 months      Creatinine   Date Value Ref Range Status   06/09/2020 1.42 (H) 0.60 - 1.10 mg/dL Final

## 2021-06-10 NOTE — TELEPHONE ENCOUNTER
ANTICOAGULATION  MANAGEMENT    Assessment     Today's INR result of 2.3 is Therapeutic (goal INR of 2.0-3.0)        Warfarin taken as previously instructed    No new diet changes affecting INR    No new medication/supplements affecting INR    Continues to tolerate warfarin with no reported s/s of bleeding or thromboembolism     Previous INR was Therapeutic    Plan:     Spoke with Ginna regarding INR result and instructed:     Warfarin Dosing Instructions:  Continue current warfarin dose    1 mg every Wed, Sat; 2 mg all other days      (0 % change)    Instructed patient to follow up no later than: 8 weeks -prefers to call to make appointment.    Education provided: importance of notifying clinic for changes in medications    Ginna verbalizes understanding and agrees to warfarin dosing plan.    Instructed to call the [facility] for any changes, questions or concerns. (#[phone])   ?   Ольга Forbes RN    Subjective/Objective:      Ginna Quiroga, a 85 y.o. female is on warfarin.     Ginna reports:     Home warfarin dose: as updated on anticoagulation calendar per template     Missed doses: No     Medication changes:  No     S/S of bleeding or thromboembolism:  No     New Injury or illness:  No     Changes in diet or alcohol consumption:  No     Upcoming surgery, procedure or cardioversion:  No    Anticoagulation Episode Summary     Current INR goal:   2.0-3.0   TTR:   85.1 % (1 y)   Next INR check:   9/24/2020   INR from last check:   2.30 (7/30/2020)   Weekly max warfarin dose:      Target end date:      INR check location:      Preferred lab:      Send INR reminders to:   [facility]    Indications    Long term current use of anticoagulant therapy [Z79.01]  DVT (deep venous thrombosis) (H) (Resolved) [I82.409]           Comments:            Anticoagulation Care Providers     Provider Role Specialty Phone number    Dawson Cloud MD Referring Internal Medicine [phone]

## 2021-06-11 NOTE — TELEPHONE ENCOUNTER
ANTICOAGULATION  MANAGEMENT    Assessment     Today's INR result of 2.7 is Therapeutic (goal INR of 2.0-3.0)        Warfarin taken as previously instructed    No new diet changes affecting INR    No new medication/supplements affecting INR    Continues to tolerate warfarin with no reported s/s of bleeding or thromboembolism     Previous INR was Therapeutic    Plan:     Left detailed message for Ginna regarding INR result and instructed:     Warfarin Dosing Instructions:  Continue current warfarin dose    1 mg every Wed, Sat; 2 mg all other days      (0 % change)    Instructed patient to follow up no later than: 8 weeks    Education provided: importance of notifying clinic for changes in medications        Instructed to call the [facility] for any changes, questions or concerns. (#[phone])   ?   Ольга Forbes RN    Subjective/Objective:      Ginna Quiroga, a 85 y.o. female is on warfarin.     Ginna reports:     Home warfarin dose: as updated on anticoagulation calendar per template     Missed doses: No     Medication changes:  No     S/S of bleeding or thromboembolism:  No     New Injury or illness:  No     Changes in diet or alcohol consumption:  No     Upcoming surgery, procedure or cardioversion:  No    Anticoagulation Episode Summary     Current INR goal:   2.0-3.0   TTR:   86.1 % (1 y)   Next INR check:   11/12/2020   INR from last check:   2.70 (9/17/2020)   Weekly max warfarin dose:      Target end date:      INR check location:      Preferred lab:      Send INR reminders to:   [facility]    Indications    Long term current use of anticoagulant therapy [Z79.01]  DVT (deep venous thrombosis) (H) (Resolved) [I82.409]           Comments:            Anticoagulation Care Providers     Provider Role Specialty Phone number    Dawson Cloud MD Referring Internal Medicine [phone]

## 2021-06-12 NOTE — PROGRESS NOTES
Medical Care for Sen[facility]/ Geriatrics    Facility:  [facility] [[id number]]    Code Status:  FULL CODE    Chief Complaint   Patient presents with     H & P   :                    Patient Active Problem List   Diagnosis     Long term current use of anticoagulant therapy     Essential hypertension     Osteoarthritis of multiple joints     Glaucoma     History of pulmonary embolus (PE)     Family history of colon cancer     Peripheral edema     Osteopenia     Vitamin D deficiency     Left foot pain     Physical deconditioning     Tachy-kayleen syndrome (H)     Status post biventricular cardiac pacemaker insertion     Multiple closed fractures of pelvis without disruption of pelvic ring with routine healing     Advance care planning     Functional diarrhea     Anticoagulation management encounter       History:  Ginna Quiroga  is an 86 year old female with history of PE/factor V Leiden, chronic warfarin anticoagulation, CKD 3, hypertension, glaucoma, and now new onset atrial fibrillation, status post permanent pacemaker placement 9/25/2000 and AV anibal ablation 9/28/2000 for tachybradycardia syndrome seen for admission to TCU on 10/8/2020    Hospital Course: Patient was hospitalized at [place] September 20 through September 29 following a syncopal fall at home in which she suffered pelvic fractures.    Patient was found to have new onset atrial fibrillation with tachybradycardia syndrome requiring permanent pacemaker placement September 25, 2000 and ongoing tachycardia thereafter resulting in AV anibal ablation 9/28/2000.  Patient is treated with metoprolol  mg daily for rate control.  Chads 2 vasc equals 4.  Ongoing oral anticoagulation recommended, patient has been on long-term warfarin user.    Subjective/ROS:    -augmented by discussion with facility staff involved in direct care      Patient reports that she is feeling well.  Her pelvic fracture (left superior and left inferior pubic rami  fractures, left sacral sierra) were managed conservatively with pain control and therapy..  She reports good pain control and likes the addition of the Voltaren.  She is also on Tylenol as needed and oxycodone 2.5 every 6 as needed.  She is able to sleep.  She said a week ago she thought she never be able to even get out of bed and is impressed at how much things have improved since then.    She reports that her cardiac symptoms were probably going on 3 to 4 weeks before this syncopal event.  She was having lightheaded spells and just felt off.    Prior to this patient reports that she has been well excepting her high blood pressure which was formally treated with enalapril Dyazide verapamil and now treated with enalapril and metoprolol.    I do find significant edema in her lower extremities today which she thinks is relatively new.  This may be due to decreased activity with her legs hanging down in the wheelchair but also due to loss of her Dyazide which was discontinued during the hospitalization.    Patient otherwise says she is not having headaches change in vision speaking swallowing chest pain cough fever sweats chills orthopnea PND wheezing abdominal pain nausea vomiting.  Her loose stools have resolved.  There is no melena or bright red blood per rectum.  No dysuria.  Remainder 13 system ROS negative    Past Medical History:   Diagnosis Date     Atrial fibrillation with RVR (H)      CKD (chronic kidney disease) stage 3, GFR 30-59 ml/min      Disorder of ligament of foot 11/28/2016     Essential hypertension 11/28/2016     Family history of colon cancer     Declines having colonoscopy     Glaucoma      History of pulmonary embolus (PE) 11/28/2016    Factor V Leiden mutation, family history pulmonary embolus     LBBB (left bundle branch block)      Osteoarthritis of multiple joints 11/28/2016     Osteopenia     DXA March 2018 T score -1.3, previous DXA normal 2009     Peripheral edema 01/15/2018      Polyarthritis rheumatica (H)      Tachy-kayleen syndrome (H)      Vitamin D deficiency      Past Surgical History:   Procedure Laterality Date     NV DISCISSION,2ND CATARACT,LASER Left 2/3/2016    Procedure: LASER YAG CAPSULOTOMY, LEFT;  Surgeon: Jitendra Rick MD;  Location: [facility];  Service: Ophthalmology     NV DISCISSION,2ND CATARACT,LASER Right 2016    Procedure: LASER YAG CAPSULOTOMY, RIGHT;  Surgeon: Jitendra Rick MD;  Location: [facility];  Service: Ophthalmology          Family History   Problem Relation Age of Onset     Pulmonary embolism Mother          at age 51     Heart attack Father          in his 70s     Colon cancer Brother              Kidney failure Sister      Prostate cancer Son      Hodgkin's lymphoma Son    :       Social History     Socioeconomic History     Marital status:      Spouse name: Not on file     Number of children: Not on file     Years of education: Not on file     Highest education level: Not on file   Occupational History     Not on file   Social Needs     Financial resource strain: Not on file     Food insecurity     Worry: Not on file     Inability: Not on file     Transportation needs     Medical: Not on file     Non-medical: Not on file   Tobacco Use     Smoking status: Never Smoker     Smokeless tobacco: Never Used   Substance and Sexual Activity     Alcohol use: Yes     Drug use: Not on file     Sexual activity: Not on file   Lifestyle     Physical activity     Days per week: Not on file     Minutes per session: Not on file     Stress: Not on file   Relationships     Social connections     Talks on phone: Not on file     Gets together: Not on file     Attends [employer] service: Not on file     Active member of club or organization: Not on file     Attends meetings of clubs or organizations: Not on file     Relationship status: Not on file     Intimate partner violence     Fear of current or ex partner: Not on file     Emotionally  abused: Not on file     Physically abused: Not on file     Forced sexual activity: Not on file   Other Topics Concern     Not on file   Social History Narrative     2001  Rod    6 sons and multiple grandchildren, also has great grandchildren.   :        Current Outpatient Medications on File Prior to Visit   Medication Sig Dispense Refill     acetaminophen (TYLENOL) 325 MG tablet Take 325 mg by mouth every 4 (four) hours as needed for pain.       diclofenac sodium (VOLTAREN) 1 % Gel Apply topically 3 (three) times a day.       enalapril (VASOTEC) 10 MG tablet TAKE 1 TABLET(10 MG) BY MOUTH TWICE DAILY 180 tablet 3     latanoprost (XALATAN) 0.005 % ophthalmic solution Administer 1 drop to both eyes at bedtime.        loperamide (IMODIUM A-D) 2 mg tablet Take 2 mg by mouth 3 (three) times a day as needed for diarrhea.       metoprolol succinate (TOPROL-XL) 100 MG 24 hr tablet TAKE ONE TABLET BY MOUTH ONCE DAILY 90 tablet 3     oxyCODONE (ROXICODONE) 5 MG immediate release tablet Take 2.5 mg by mouth every 6 (six) hours as needed for pain.       warfarin ANTICOAGULANT (COUMADIN/JANTOVEN) 2 MG tablet TAKE ONE-HALF TO ONE TABLET (1-2MG) ONCE DAILY AS DIRECTED ADJUST PER INR RESULTS (Patient taking differently: Hold till 10/4/20. Next INR 10/5/20) 90 tablet 1     No current facility-administered medications on file prior to visit.    :      ALLERGIES:  Patient has no known allergies.    Vitals:  There were no vitals taken for this visit. except as noted below    Vital signs:  Most Recent Vitals Date/Time Taken  Temperature: 98.5  F 10/08/2020 03:49 PM  Pulse: 69 per minute 10/08/2020 03:49 PM  Respirations: 18 per minute 10/08/2020 03:49 PM  Blood Pressure: 125 / 67 mmHg 10/08/2020 03:49 PM  O2 Saturation: 100 % 10/08/2020 03:49 PM  Weight: 172 lbs / Routine       BMI: 29.52 10/07/2020 02:54 PM  Height: 5ft 4.0in 09/29/2020 10:50 PM  INR (International Normalized Ratio): 1.6 10/07/2020 10:35 AM  Physical  exam:    Patient is alert oriented pleasant and appears younger than her stated age.  Normocephalic/atraumatic gaze is conjugate sclera clear demonstrates good range of motion of her neck with head rotation demonstrates purposeful motion of all 4 extremities.  Flexion of both hips causes some pain more on the left side anteriorly in the pelvic region.  She has 2+ pitting edema to the upper shin level.  Minimally tender.  No venous stasis dermatitis.  Her left chest pacemaker wound is healing nicely.  Her heart is regular S1-S2 with a soft early systolic murmur in the left parasternal area.  Her lungs are clear and she was recently.  Due to the 2020 Covid 19 pandemic, except as noted above, the patient was visually observed at a 6 foot plus distance.  An observational exam was performed in an effort to keep patient safe from Covid 19 and other communicable diseases.   Labs:  Lab Results   Component Value Date    WBC 8.0 06/09/2020    HGB 12.8 06/09/2020    HCT 37.8 06/09/2020    MCV 87 06/09/2020     06/09/2020     Results for orders placed or performed in visit on 06/05/19   Basic Metabolic Panel   Result Value Ref Range    Sodium 141 136 - 145 mmol/L    Potassium 4.5 3.5 - 5.0 mmol/L    Chloride 104 98 - 107 mmol/L    CO2 31 22 - 31 mmol/L    Anion Gap, Calculation 6 5 - 18 mmol/L    Glucose 81 70 - 125 mg/dL    Calcium 9.6 8.5 - 10.5 mg/dL    BUN 32 (H) 8 - 28 mg/dL    Creatinine 1.49 (H) 0.60 - 1.10 mg/dL    GFR MDRD Af Amer 40 (L) >60 mL/min/1.73m2    GFR MDRD Non Af Amer 33 (L) >60 mL/min/1.73m2         No results found for: TSH  No results found for: HGBA1C  [unfilled]  No results found for: IAKHSTYW10  No results found for: BNP  [unfilled]        Invalid input(s): PRINTERVAL ECHO COMPLETE WO CONTRAST9/21/2020  [facility]  Component Name Value Ref Range   EJECTION FRACTION 55%     Other Result Information   This result has an attachment that is not available.    Result Narrative      [facility] [address] [address]   [phone] [email or web address]                                                 Transthoracic Echo Report   ANDRÉS MCKEON ID: [id number] Age: 85 : 10/05/1934 Ordering Provider: DOREEN COHEN   Exam Date: 2020 12:09 Gender: F Sonographer: [facility]   Accession #: [id number] Height: 66 in BSA: 1.88 m  BP: 126 / 50   Weight: 172 lbs BMI: 27.8 kg/m  HR: 68     Location: Inpatient (Portable) Rhythm: Irregular   Procedure Components: 2D imaging, Color Doppler, Spectral Doppler   Indications: Paroxysmal atrial fibrillation   Technical Quality: Fair Contrast: None     Final Conclusion   1. Normal left ventricular chamber size. Normal left ventricular wall thickness. Abnormal   ventricular septal motion due to abnormal   conduction.  Ventricular septal flattening in systole and diastole consistent with right   ventricular pressure and volume overload.   Borderline normal left ventricular systolic function. Estimated left ventricular ejection   fraction is 55%.   2. Normal right ventricular chamber size. Normal right ventricular systolic function.   Estimated right ventricular systolic pressure is 57   mmHg plus right atrial pressure.   4.  Mild tricuspid valve regurgitation.     There were no prior studies available for comparison.     EXAM: CT PELVIS WO  LOCATION: [facility] MEDICAL IMAGING  DATE/TIME: 2020 10:06 AM    INDICATION: Pelvic fracture, followup, pelvic pain.  COMPARISON: 20 radiographs.  TECHNIQUE: CT scan of the pelvis was performed without IV contrast. Multiplanar  reformats were obtained. Dose reduction techniques were used.  CONTRAST: None.    FINDINGS:  Comminuted mildly displaced fracture of the medial portion of the left superior  pubic ramus with extension to the left side of the body of the symphysis pubis.  Acute mildly displaced fractures of the  left inferior pubic ramus and left  sacral ala. No evidence of a femoral fracture. Moderate degenerative changes in  both hips.    There is a moderate-sized acute hematoma along the left pelvic sidewall as seen  on series 3 image 52 and series 6 image 65, as well as some localized  intramuscular hematoma adjacent to the superior pubic ramus fracture.    IMPRESSION:  1.  Comminuted mildly displaced fracture of the left superior pubic ramus  extending into the body of the symphysis pubis with adjacent pelvic wall  hematoma.    2.  Acute mildly displaced fractures of the left inferior pubic ramus and left  sacral ala.  Result Narrative   EXAM: CT CHEST PE STUDY  LOCATION: [facility] MEDICAL IMAGING  DATE/TIME: 9/20/2020 2:40 PM    INDICATION: Dizziness. Fall, pain. History of pulmonary embolism. PE suspected,  high pretest prob.  COMPARISON: None available.  TECHNIQUE: CT chest pulmonary angiogram during arterial phase injection of IV  contrast. Multiplanar reformats and MIP reconstructions were performed. Dose  reduction techniques were used.   CONTRAST: IOHEXOL 350 MG IODINE/ML  ML BOTTLE: 75mL    FINDINGS:  ANGIOGRAM CHEST: Pulmonary arteries are normal caliber and negative for  pulmonary emboli. Thoracic aorta is negative for dissection.     LUNGS AND PLEURA: Calcified granulomas right lung. Minimal linear atelectasis or  scarring left base. No focal consolidation. No pleural effusion or pneumothorax.    MEDIASTINUM/AXILLAE: No adenopathy. No pericardial effusion. Coronary artery  stent. Mild atherosclerotic calcifications.    UPPER ABDOMEN: Cholelithiasis.    MUSCULOSKELETAL: Moderate degenerative changes both shoulders. Bony  demineralization. Mild scattered degenerative changes in the spine. No acute  osseous abnormality.    IMPRESSION:  Negative for pulmonary embolism.       Result Narrative   EXAM: XR HIP 2 OR 3 VIEWS W PELVIS LEFT  LOCATION: [facility] MEDICAL IMAGING  DATE/TIME: 9/20/2020 12:46 PM    INDICATION:  Fall, injury. Left hip pain.  COMPARISON: None.    IMPRESSION: Acute nondisplaced fractures of the mid left superior and inferior  pubic rami. Bony demineralization. Moderate degenerative changes both hips. No  hip fracture.       Assessment/Plan:      ICD-10-CM    1. Long term current use of anticoagulant therapy  Z79.01    2. Essential hypertension  I10    3. Tachy-kayleen syndrome (H)  I49.5    4. History of pulmonary embolus (PE)  Z86.711        Fall with injury  Cardiac syncope  Tachybradycardia syndrome  Atrial fibrillation with RVR new onset  AV anibal ablation September 28  Permanent pacemaker placement September 25, 2020  Chads 2 Vasc equals 4   Patient is doing quite well at this point.  Heart rate and blood pressure have been stable.  She has been able to participate in therapy and is improving daily.  -PT OT  all involved    Superior pubic ramus fracture left  Inferior pubic ramus fracture left  Left sacral sierra fracture   Topical Voltaren just added and patient is of the opinion that it helps.  Also has PRN Tylenol and oxycodone however she says that her pain is diminishing day by day and that it is not affecting her sleep or her ability to participate in PT.  No new changes made today.    Presumed osteoporosis   Patient's last DEXA scan was 2018 at which point she was diagnosed with osteopenia:   ==================================================================  RE: Ginna Quiroga  YOB: 1934           Dear Dawson Cloud,     Patient Profile:  83 y.o. female, postmenopausal, is here for the first bone density test at this site.  History of fractures - None. Family history of osteoporosis - None.  Family history of hip fracture: None. Smoking history - No. Osteoporosis treatment past -  No. Osteoporosis treatment current - No.  Chronic medical problems - Height loss. High risk medications -  Blood thinner (Coumadin or Heparin);  Yes, Currently.     Assessment:     1.  The spine bone density is best assessed using L1-L2 with T-score of 0.1.  2. Femoral bone densities show left femoral neck T- score of -1.3, and right total hip T-score of -0.9.  3. Vertebral fracture assessment did not show any vertebral compression fractures.  Study appeared adequate for assessment from L4-T6.  4 The trabecular bone score suggests good microarchitecture.     83 y.o. female with LOW BONE DENSITY (OSTEOPENIA) and MODERATE predicted fracture risk with a TBS adjusted 10 year major osteoporotic fracture risk of 11.8 % and hip fracture risk of 3.4 %.  This prediction tool is strongly influenced by age.     Recommendations:  Ensure adequate calcium, vitamin D intake, and regular weightbearing exercise with a recheck in 2-3 years to monitor for stability.        Bone densitometry was performed on your patient using our MobiD[employer] densitometer. The results are summarized and a copy of the actual scans are included for your review. In conformity with the International Society of Clinical Densitometry's most recent position statement for DXA interpretation (2015), the diagnosis will be made on the lowest measured T-score of the lumbar spine, femoral neck, total proximal femur or 33% radius. Note the change in terminology for diagnostic classification from OSTEOPENIA to LOW BONE MASS. All trending for sequential exams will be done using multiple vertebrae or the total proximal femur. Fracture risk is based on the WHO Fracture Risk Assessment Tool (FRAX). If additional information is needed or if you would like to discuss the results, please do not hesitate to call me.         Thank you for referring this patient to [facility]. We are happy to be of service in support of you and your practice. If you have any questions or suggestions to improve our service, please call me at [phone].      Sincerely,      Angel Campos M.D. C.C.MAZIN.  Osteoporosis Services, [facility]    ==================================================================    -Anticipate follow-up with Dr. Saldaña following discharge here, to update DEXA and determine treatment strategy, questionably bisphosphonates?      Peripheral edema   Symmetric nontender but patient says it is not a typical finding for her.  May be due to decreased activity, sitting in chair with her legs hanging down but also to loss of Dyazide which was discontinued in the hospital.  - Lymphedema therapy  - Lymphedema garments of therapist choice  -Elevate legs above heart level when resting  - Consider going back to diuretic depending on need but would be nice to minimize medications of course    Hypertension   Both Dyazide and verapamil were discontinued, she is instead on enalapril 10 mg twice daily which is a continuation of Toprol- mg daily.  Blood pressures are looking good overall here.  No additional changes made today    CKD 3   Noted, avoid nephrotoxins, ACE inhibitor is in place    Factor V Leiden  History of PE  Long-term warfarin anticoagulation   INR subtherapeutic 1.7, new orders were placed by other provider.  Will need ongoing monitoring    Glaucoma   Latanoprost continues    Osteoarthritis knees   See notes by Ms. Hall, see x-ray results consistent with osteoarthritis.  Spoke with physical therapist today who says that the knee pain is really what is limiting her in therapy more than the pelvic fractures now.  Patient agrees with that.  She has had injections in the past both coxcomb and steroid.  She is interested in injections.  Ms. Hall will be providing them in the foreseeable future  Case discussed with:  Primary CNP   Facility staff         Reji Ta MD

## 2021-06-12 NOTE — TELEPHONE ENCOUNTER
ANTICOAGULATION  MANAGEMENT    Assessment     Today's INR result of 2.50 is Therapeutic (goal INR of 2.0-3.0)        Warfarin taken as previously instructed    No new diet changes affecting INR    No new medication/supplements affecting INR    Continues to tolerate warfarin with no reported s/s of bleeding or thromboembolism     Previous INR was Supratherapeutic at 3.37 on 10/2/20.    Recently discharged from TCU from 9//28 - 10/29/20.  Due to syncopal fall at home on 9/20, and suffered left pelvic fracture.      She also had s/p dual chamber pacemaker implant on 9/25 and AV anibal ablation on 9/28/20, d/t LBBB and bradycardia.    She is glad to be back home.    Plan:     Spoke on phone with Ginna regarding INR result and instructed:     Warfarin Dosing Instructions:  (verified has 2mg tabs).   - she resumed her previous warfarin prior to hospitalization.   - Continue current warfarin dose 1 mg daily on Wed/Sat; and 2 mg daily rest of week.    Instructed patient to follow up no later than:  One wk.   - INR scheduled on 11/9/20 during hosp f/u with Dr. Cloud.    Education provided: importance of consistent vitamin K intake, target INR goal and significance of current INR result and importance of notifying clinic for changes in medications    Ginna verbalizes understanding and agrees to warfarin dosing plan.    Instructed to call the [facility] for any changes, questions or concerns. (#[phone])   ?   Princess Stokes RN    Subjective/Objective:      Ginna Quiroga, a 86 y.o. female is on warfarin.     Ginna reports:     Home warfarin dose: verbally confirmed home dose with Ginna and updated on anticoagulation calendar     Missed doses: No     Medication changes:  Yes:  Reported some medication changes while in TCU.     S/S of bleeding or thromboembolism:  No     New Injury or illness:  No     Changes in diet or alcohol consumption:  No     Upcoming surgery, procedure or cardioversion:   No    Anticoagulation Episode Summary     Current INR goal:  2.0-3.0   TTR:  89.0 % (1 y)   Next INR check:  11/6/2020   INR from last check:  2.50 (10/30/2020)   Weekly max warfarin dose:     Target end date:     INR check location:     Preferred lab:     Send INR reminders to:  [facility]    Indications    Long term current use of anticoagulant therapy [Z79.01]  DVT (deep venous thrombosis) (H) (Resolved) [I82.409]           Comments:           Anticoagulation Care Providers     Provider Role Specialty Phone number    Dawson Cloud MD Referring Internal Medicine [phone]

## 2021-06-12 NOTE — PROGRESS NOTES
[facility]    Facility:   [facility] [[id number]]   Code Status: FULL CODE and POLST AVAILABLE      CHIEF COMPLAINT/REASON FOR VISIT:  Chief Complaint   Patient presents with     Procedure     knee injections       HISTORY:      HPI: Ginna is a 86 y.o. female who  has a past medical history of Atrial fibrillation with RVR (H), CKD (chronic kidney disease) stage 3, GFR 30-59 ml/min, Disorder of ligament of foot (11/28/2016), Essential hypertension (11/28/2016), Family history of colon cancer, Glaucoma, History of pulmonary embolus (PE) (11/28/2016), LBBB (left bundle branch block), Osteoarthritis of multiple joints (11/28/2016), Osteopenia, Peripheral edema (01/15/2018), Polyarthritis rheumatica (H), Tachy-kayleen syndrome (H), and Vitamin D deficiency.  1 Cristal was recently admitted to [facility] from September 20, 2020 and was discharged on September 29, 2020.  She was admitted for tachy-kayleen syndrome and a pacemaker was placed.  The discharging provider summarized the hospitalization in previous notes.     Today when he is being evaluated for knee injections today. She has requested knee injections after having significant pain that was limiting therapies. She states she has had injections before and they have been quite helpful. She is very willing to undergo this procedure again in hopes that she will be making vas improvements with therapies. Ginna does understand the risks and benefits including risk of skin atrophy, muscle atrophy, joint atrophy, infection, death and disability or ineffective pain management. Alternative therapies have been discussed and she elects to continue with knee injections.     Past Medical History:   Diagnosis Date     Atrial fibrillation with RVR (H)      CKD (chronic kidney disease) stage 3, GFR 30-59 ml/min      Disorder of ligament of foot 11/28/2016     Essential hypertension 11/28/2016     Family history of colon cancer     Declines  having colonoscopy     Glaucoma      History of pulmonary embolus (PE) 2016    Factor V Leiden mutation, family history pulmonary embolus     LBBB (left bundle branch block)      Osteoarthritis of multiple joints 2016     Osteopenia     DXA 2018 T score -1.3, previous DXA normal 2009     Peripheral edema 01/15/2018     Polyarthritis rheumatica (H)      Tachy-kayleen syndrome (H)      Vitamin D deficiency              Family History   Problem Relation Age of Onset     Pulmonary embolism Mother          at age 51     Heart attack Father          in his 70s     Colon cancer Brother              Kidney failure Sister      Prostate cancer Son      Hodgkin's lymphoma Son      Social History     Socioeconomic History     Marital status:      Spouse name: Not on file     Number of children: Not on file     Years of education: Not on file     Highest education level: Not on file   Occupational History     Not on file   Social Needs     Financial resource strain: Not on file     Food insecurity     Worry: Not on file     Inability: Not on file     Transportation needs     Medical: Not on file     Non-medical: Not on file   Tobacco Use     Smoking status: Never Smoker     Smokeless tobacco: Never Used   Substance and Sexual Activity     Alcohol use: Yes     Drug use: Not on file     Sexual activity: Not on file   Lifestyle     Physical activity     Days per week: Not on file     Minutes per session: Not on file     Stress: Not on file   Relationships     Social connections     Talks on phone: Not on file     Gets together: Not on file     Attends [employer] service: Not on file     Active member of club or organization: Not on file     Attends meetings of clubs or organizations: Not on file     Relationship status: Not on file     Intimate partner violence     Fear of current or ex partner: Not on file     Emotionally abused: Not on file     Physically abused: Not on file     Forced sexual  activity: Not on file   Other Topics Concern     Not on file   Social History Narrative     2001  [place]    6 sons and multiple grandchildren, also has great grandchildren.       REVIEW OF SYSTEM:  Per HPI    PHYSICAL EXAM:   /79   Pulse 73   Temp 97.5  F (36.4  C)   Resp 18   Wt 177 lb 6.4 oz (80.5 kg)   SpO2 95%   BMI 29.98 kg/m      A limited exam was performed due to recommendations for care during COVID-19 pandemic. Due to the 2020 COVID-19 pandemic, except as noted above, the patient was visually observed at a 6 foot plus distance.  An observational exam was performed in an effort to keep patient safe from COVID-19 and other communicable diseases.     General appearance: alert, appears stated age and cooperative  HEENT: Head is normocephalic with normal hair distribution. No evidence of trauma. Ears: Without lesions or deformity. No acute purulent discharge. Eyes: Conjunctivae pink with no scleral icterus or erythema. Nose: Normal. Oropharnyx: mmm.  Lungs: respirations without effort.  Extremities: extremities normal, atraumatic, no cyanosis.  Skin: Skin color, texture normal. No rashes or lesions on exposed skin.   Neurologic: Grossly normal   Psych: interacts well with caregivers, exhibits logical thought processes and connections, pleasant.      LABS:   None today.     ASSESSMENT:      ICD-10-CM    1. Primary osteoarthritis involving multiple joints  M89.49        PLAN:    Care plan reviewed and remains appropriate. Therapies to continue.     Physical Deconditioning  -Continue PT/OT and other therapies as per care plan.  -Encouraged good nutrition and movement habits.   -Discussed care plan and expected course of stay.   -Continue to follow-up per routine schedule or sooner if needed.     Tachy kayleen Syndrome, s/p Pacemaker Insertions  -To follow with cardiology.   -Metoprolol 100 mg by mouth daily.   -Vasotec 10 mg by mouth two times a day.     Pelvis Fractures  -Tylenol 325 mg by  "mouth every 4 hours as needed.   -Oxycodone 2.5 mg by mouth every 6 hours as needed.     Osteoarthritis  -knee injections today per procedure note:     Procedure Note    Procedure:  Bilateral knee injections    Diagnosis: Osteoarthritis    Anesthesia/Sedation: Lidocaine 2%, local anesthetic    Injectable Lot #'s:  Methyprednisolone x 2 bottles: [id number] exp. 06/2022  Lidocaine 2%: -DK exp. 10/1/2021    Procedure Details: The intended procedure, risks (infection, skin atrophy, ineffective pain management), and alternatives (PT/OT, oral pain management) were discussed with the patient and informed consent was obtained. \"Pause for the cause\" was utilized to identify correct patient and intended procedure. The joint was cleansed with betadine and allowed to dry. 6.5 cc of Lidocaine mixed with 40 mg of Kenalog was injected into the right knee using a bia-lateral approach. The procedure was repeated with the left knee using the same mixture and amounts. The patient tolerated the procedure well.     Pain:   Pre procedure: 8/10 when walking or moving, 8/10 when sitting  Post procedure: 1/10    Findings: successful joint injection     Specimens: None      Admission history and physical per MD in the next 30 days. At this time continue current care plan for all chronic medical conditions, as they are stable. Encouraged patient to engage in PT/OT for strengthening and conditioning. Encouraged patient to work closely with nursing staff to ensure any medical complaints are quickly addressed. Follow up this week or sooner if needed. Will continue to monitor patient and work with nursing staff collaboratively to work toward positive patient outcomes.    Electronically signed by: Lyubov Hall CNP  "

## 2021-06-12 NOTE — TELEPHONE ENCOUNTER
[facility] Nurse Triage Anticoagulation Note      Provider: KENDRICK Orellana  Facility: [facility]     Facility Type: TCU    Caller: Radha  Call Back Number:  [phone]    Reason for call: INR    Today s INR: 3.1  Previous INR: 10/13 4.1(hold x 2), 10/9 2.2(2mg daily).      Diagnosis/Goal: AFIB  Heparin/Lovenox: No   Currently on ABX: No  Other interacting Medications: None  Missed or refused doses: No    Verbal Order/Direction given by Provider: Take Warfarin 1.5mg daily.  Next INR 10/19/20.      Provider giving order: KENDRICK Orellana    Verbal order given to: Radha Batista RN

## 2021-06-12 NOTE — PROGRESS NOTES
[facility]    Facility:   [facility] [[id number]]   Code Status: POLST AVAILABLE  PCP: Dawson Cloud MD   Phone: [phone]   Fax: [phone]      CHIEF COMPLAINT/REASON FOR VISIT:  Chief Complaint   Patient presents with     Discharge Summary       HISTORY COURSE:    Ginna is a 86 y.o. female who  has a past medical history of Atrial fibrillation with RVR (H), CKD (chronic kidney disease) stage 3, GFR 30-59 ml/min, Disorder of ligament of foot (11/28/2016), Essential hypertension (11/28/2016), Family history of colon cancer, Glaucoma, History of pulmonary embolus (PE) (11/28/2016), LBBB (left bundle branch block), Osteoarthritis of multiple joints (11/28/2016), Osteopenia, Peripheral edema (01/15/2018), Polyarthritis rheumatica (H), Tachy-kayleen syndrome (H), and Vitamin D deficiency.  Ginna was recently admitted to [facility] from September 20, 2020 and was discharged on September 29, 2020.  She was admitted for tachy-kayleen syndrome and a pacemaker was placed.  The discharging provider summarized the hospitalization in previous notes.     Today Ginna is being evaluated for a discharge from the TCU.  She had very few medication changes.  From the hospital she was weaned off of oxycodone due to nonuse and pain control without analgesia.  She was also started on Lasix as a diuretic.  She has been doing particularly well.  She has no pain.  She has been eating, drinking and eliminating well.  She has no new concerns or issues to report.  She is looking forward to going home.  She feels that she will be safe at home and does have resources to support her and she feels that her discharge will be successful.  Ginna denies any other concerns including fevers/chills, cough or cold symptoms, headaches, vision changes, chest pain/pressure, difficulty breathing, SOB, abdominal pain, nausea, vomiting, diarrhea, dysuria, increasing weakness, increasing pain.     PHYSICAL  EXAM:   /80   Pulse 70   Temp 98.2  F (36.8  C)   Resp 18   Wt 169 lb 12.8 oz (77 kg)   SpO2 97%   BMI 28.70 kg/m      A limited exam was performed due to recommendations for care during COVID-19 pandemic. Due to the 2020 COVID-19 pandemic, except as noted above, the patient was visually observed at a 6 foot plus distance.  An observational exam was performed in an effort to keep patient safe from COVID-19 and other communicable diseases.     General appearance: alert, appears stated age and cooperative  HEENT: Head is normocephalic with normal hair distribution. No evidence of trauma. Ears: Without lesions or deformity. No acute purulent discharge. Eyes: Conjunctivae pink with no scleral icterus or erythema. Nose: Normal. Oropharnyx: mmm.  Lungs: respirations without effort.  Extremities: extremities normal, atraumatic, no cyanosis.  Skin: Skin color, texture normal. No rashes or lesions on exposed skin.   Neurologic: Grossly normal   Psych: interacts well with caregivers, exhibits logical thought processes and connections, pleasant.    MEDICATION LIST:  Current Outpatient Medications   Medication Sig     acetaminophen (TYLENOL) 325 MG tablet Take 325 mg by mouth every 4 (four) hours as needed for pain.     diclofenac sodium (VOLTAREN) 1 % Gel Apply topically 3 (three) times a day.     enalapril (VASOTEC) 10 MG tablet TAKE 1 TABLET(10 MG) BY MOUTH TWICE DAILY     latanoprost (XALATAN) 0.005 % ophthalmic solution Administer 1 drop to both eyes at bedtime.      loperamide (IMODIUM A-D) 2 mg tablet Take 2 mg by mouth 3 (three) times a day as needed for diarrhea.     metoprolol succinate (TOPROL-XL) 100 MG 24 hr tablet TAKE ONE TABLET BY MOUTH ONCE DAILY     oxyCODONE (ROXICODONE) 5 MG immediate release tablet Take 2.5 mg by mouth every 6 (six) hours as needed for pain.     warfarin sodium (WARFARIN ORAL) Take by mouth. 10/15/20 INR 3.1  Take 1.5mg daily.  Next INR 10/19/20.       DISCHARGE DIAGNOSIS:     ICD-10-CM    1. Physical deconditioning  R53.81    2. Multiple closed fractures of pelvis without disruption of pelvic ring with routine healing  S32.82XD    3. Status post biventricular cardiac pacemaker insertion  Z95.0    4. Tachy-kayleen syndrome (H)  I49.5      Physical Deconditioning  -Continue PT/OT and other therapies as per care plan.  -Encouraged good nutrition and movement habits.   -Discussed care plan and expected course of stay.   -Continue to follow-up per routine schedule or sooner if needed.     Tachy kayleen Syndrome, s/p Pacemaker Insertions  -To follow with cardiology.   -Metoprolol 100 mg by mouth daily.   -Vasotec 10 mg by mouth two times a day.     Pelvis Fractures  -Tylenol 325 mg by mouth every 4 hours as needed.   -Discontinue oxycodone due to nonuse.    Otherwise continue current care plan for all other chronic medical conditions, as they are stable. Encouraged patient to engage in healthy lifestyle behaviors such as engaging in social activities, exercising (PT/OT), eating well, and following care plan. Follow up for routine check-up, or sooner if needed. Will continue to monitor patient and work with nursing staff collaboratively to work toward positive patient outcomes.    MEDICAL EQUIPMENT NEEDS:  None.    DISCHARGE PLAN/FACE TO FACE:  I certify that services are/were furnished while this patient was under the care of a physician and that a physician or an allowed non-physician practitioner (NPP), had a face-to-face encounter that meets the physician face-to-face encounter requirements. The encounter was in whole, or in part, related to the primary reason for home health. The patient is confined to his/her home and needs intermittent skilled nursing, physical therapy, speech-language pathology, or the continued need for occupational therapy. A plan of care has been established by a physician and is periodically reviewed by a physician.  Date of Face-to-Face Encounter: 10/28/2020    I  certify that, based on my findings, the following services are medically necessary home health services: home health aid for bathing and ADLs, skilled nursing (RN) for medication set-up and teaching, symptoms and disease process monitoring and education, PT for strengthening, balance, endurance and safety within the home, OT for strengthening, ADL needs, adaptive equipment and safety.    My clinical findings support the need for the above skilled services because: home health aid for bathing and ADLs, skilled nursing (RN) for medication set-up and teaching, symptoms and disease process monitoring and education, PT for strengthening, balance, endurance and safety within the home, OT for strengthening, ADL needs, adaptive equipment and safety.    This patient is homebound because: he is a frail elderly gentleman with multiple comorbidities and recent hospitalizations making it unsafe for him to go out into the community for services.     The patient is, or has been, under my care and I have initiated the establishment of the plan of care. This patient will be followed by a physician who will periodically review the plan of care. Schedule follow up visit with primary care provider within 7 days to reestablish care.    Total unit/floor time of 35 minutes time spent on discharge planning, discharge follow-up discussion and discharge medication review.    Electronically signed by: Lyubov Hall CNP

## 2021-06-12 NOTE — PROGRESS NOTES
Office Visit - Follow Up   Ginna Quiroga   86 y.o. female    Date of Visit: 11/9/2020    Chief Complaint   Patient presents with     Hospital Visit Follow Up     TCU follow up        Assessment and Plan   1. Paroxysmal atrial fibrillation (H)  New diagnosis of atrial fibrillation and tachybradycardia syndrome presenting with syncope found to have uncontrolled ventricular rate.  Underwent placement of permanent pacemaker and subsequent ablation.  Continues anticoagulation with warfarin.  Continues on Toprol-XL.  No longer on verapamil.    2. Tachy-kayleen syndrome (H)  Permanent pacemaker after presentation with atrial fibrillation and intermittent bradycardia    3. Status post biventricular cardiac pacemaker insertion  Enrolled in pacemaker clinic    4. Multiple closed fractures of pelvis without disruption of pelvic ring with routine healing  Left superior and inferior pubic ramus fracture.  No longer requiring pain medication.  Increasing mobility.  Still needing walker when up ambulating and handicap parking sticker provided.    5. Age-related osteoporosis with current pathological fracture with routine healing  Discussed likelihood of osteoporosis in the setting of pelvic fracture.  We will get her DEXA to establish new baseline.  I am recommending that she start Fosamax 70 mg every week.  We discussed how to take correctly and potential side effects including esophageal ulcer and osteonecrosis of the jaw.  Vitamin D level looks good earlier this year over 40.  - alendronate (FOSAMAX) 70 MG tablet; Take 1 tablet (70 mg total) by mouth every 7 days. Take in the morning on an empty stomach with a full glass of water 30 minutes before food  Dispense: 12 tablet; Refill: 3  - DXA Bone Density Scan; Future    6. Peripheral edema  Well-controlled with Lasix    7. Essential hypertension  Good blood pressure control with current combination of medication.  Lasix replaced with Dyazide and no longer on verapamil.  -  metoprolol succinate (TOPROL-XL) 100 MG 24 hr tablet; Take 1 tablet (100 mg total) by mouth daily.  Dispense: 90 tablet; Refill: 3  - furosemide (LASIX) 40 MG tablet; Take 1 tablet (40 mg total) by mouth daily.  Dispense: 90 tablet; Refill: 3  - Comprehensive Metabolic Panel  - HM2(CBC w/o Differential)    8. History of pulmonary embolus (PE)  Continue chronic anticoagulation with warfarin  - INR    9. Vitamin D deficiency  Vitamin D level over 40 earlier this year    10. Physical deconditioning  Still recovering from pelvic fracture.  Continues home exercises.    11. Long term current use of anticoagulant therapy      12. Encounter for therapeutic drug monitoring  Monitor electrolytes including potassium and magnesium while on diuretic  - Magnesium    Return in about 4 months (around 3/9/2021) for Recheck.     History of Present Illness   This 86 y.o. old woman with history of pulmonary embolus and hypertension hospitalized in September 2020 after syncopal episode.  Found to have left-sided superior and inferior pubic ramus fracture.  Found to be in atrial fibrillation with uncontrolled ventricular rate.  Also component of tachybradycardia syndrome likely contributing to syncopal episode.  Permanent pacemaker placed and subsequently had ablation as ongoing difficulty controlling her rate.  Echocardiogram showing normal left ventricular systolic function and no significant valvular heart disease.  Was already anticoagulated with warfarin with history of pulmonary embolus and this was continued at discharge.  Sent to TCU where she spent the past 4 weeks and now at home.  She continues home exercises but remains significantly deconditioned.  Unable to do stairs yet.  She and her son are asking for handicap parking sticker.  Denies feeling any palpitations.  No dyspnea.  No edema.  Now on Lasix instead of Dyazide.  No longer on verapamil.  Blood pressure remains well controlled.  We discussed her pelvic fracture and  concern for osteoporosis despite results of DEXA 2 years ago.  Starting Fosamax was discussed.  Vitamin D level good earlier this year.    Review of Systems:  Otherwise, a comprehensive review of systems was negative except as noted.     Medications, Allergies and Problem List   Patient Active Problem List   Diagnosis     Long term current use of anticoagulant therapy     Essential hypertension     Osteoarthritis of multiple joints     Glaucoma     History of pulmonary embolus (PE)     Family history of colon cancer     Peripheral edema     Vitamin D deficiency     Left foot pain     Physical deconditioning     Status post biventricular cardiac pacemaker insertion     Functional diarrhea     Anticoagulation management encounter     Age-related osteoporosis with current pathological fracture with routine healing     CKD (chronic kidney disease) stage 3, GFR 30-59 ml/min     Multiple closed fractures of pelvis without disruption of pelvic ring with routine healing     Paroxysmal atrial fibrillation (H)     Tachy-kayleen syndrome (H)       She has a past surgical history that includes pr discission,2nd cataract,laser (Left, 02/03/2016); pr discission,2nd cataract,laser (Right, 02/17/2016); and Cardiac pacemaker placement.    No Known Allergies    Current Outpatient Medications   Medication Sig Dispense Refill     diclofenac sodium (VOLTAREN) 1 % Gel Apply topically 3 (three) times a day.       furosemide (LASIX) 40 MG tablet Take 1 tablet (40 mg total) by mouth daily. 90 tablet 3     latanoprost (XALATAN) 0.005 % ophthalmic solution Administer 1 drop to both eyes at bedtime.        metoprolol succinate (TOPROL-XL) 100 MG 24 hr tablet Take 1 tablet (100 mg total) by mouth daily. 90 tablet 3     warfarin sodium (WARFARIN ORAL) Take by mouth. 10/15/20 INR 3.1  Take 1.5mg daily.  Next INR 10/19/20.       acetaminophen (TYLENOL) 325 MG tablet Take 325 mg by mouth every 4 (four) hours as needed for pain.       alendronate  "(FOSAMAX) 70 MG tablet Take 1 tablet (70 mg total) by mouth every 7 days. Take in the morning on an empty stomach with a full glass of water 30 minutes before food 12 tablet 3     enalapril (VASOTEC) 10 MG tablet TAKE 1 TABLET(10 MG) BY MOUTH TWICE DAILY 180 tablet 3     No current facility-administered medications for this visit.         Physical Exam   General Appearance:   Well-appearing elderly woman    /80 (Patient Site: Left Arm, Patient Position: Sitting, Cuff Size: Adult Large)   Pulse 68   Ht 5' 4.5\" (1.638 m)   Wt 165 lb (74.8 kg)   SpO2 95%   BMI 27.88 kg/m      HEENT: Normal  Respiratory: Normal respiratory effort.  Lungs are clear with no rales or wheezes.  Heart: Regular rate and rhythm without murmurs, rubs, or gallops.    Abdomen: Abdomen is soft, nontender without guarding, rebound, masses, or hepatosplenomegaly.  Extremities: No peripheral edema.  Neurologic: Grossly nonfocal  Skin: No cyanosis or pallor           Additional Information   Social History     Tobacco Use     Smoking status: Never Smoker     Smokeless tobacco: Never Used   Substance Use Topics     Alcohol use: Yes     Drug use: Not on file         Review and/or order of clinical lab tests: Rechecking INR, hemoglobin and CMP  Review and/or order of radiology tests: Reviewed last DEXA from 2018 showing T score -1.3.  Ordering new DEXA.  Review and/or order of medicine tests: Reviewed echocardiogram from September 2020 showing normal left ventricular systolic function with EF 55% with mild tricuspid valve regurgitation but no other significant valvular heart disease.    Review and summarization of old records and/or obtaining history from someone other than the patient and.or discussion of case with another health care provider: Reviewed outside records from hospitalization at [facility] with syncope found to have atrial fibrillation with uncontrolled ventricular rate and pelvic fracture.  Underwent pacemaker placement " and ablation.      Time: total time spent with the patient was 40 minutes of which >50% was spent in counseling and coordination of care     Dawson Cloud MD

## 2021-06-12 NOTE — PROGRESS NOTES
[facility]    Facility:   [facility] [[id number]]   Code Status: FULL CODE and POLST AVAILABLE      CHIEF COMPLAINT/REASON FOR VISIT:  Chief Complaint   Patient presents with     Review Of Multiple Medical Conditions     physical deconditioning, tachy-kayleen syndrome, s/p pacemaker insertion, pelvis fracture, knee pain       HISTORY:      HPI: Ginna is a 86 y.o. female who  has a past medical history of Atrial fibrillation with RVR (H), CKD (chronic kidney disease) stage 3, GFR 30-59 ml/min, Disorder of ligament of foot (11/28/2016), Essential hypertension (11/28/2016), Family history of colon cancer, Glaucoma, History of pulmonary embolus (PE) (11/28/2016), LBBB (left bundle branch block), Osteoarthritis of multiple joints (11/28/2016), Osteopenia, Peripheral edema (01/15/2018), Polyarthritis rheumatica (H), Tachy-kayleen syndrome (H), and Vitamin D deficiency.  1 Cristal was recently admitted to [facility] from September 20, 2020 and was discharged on September 29, 2020.  She was admitted for tachy-kayleen syndrome and a pacemaker was placed.  The discharging provider summarized the hospitalization in previous notes.     Today when he is being evaluated for a routine review of multiple medical problems while in the TCU. She shares she has been doing quite well other than the knee pain. She has continued with therapies, however she does have significant knee pain. She has had injections before and is requesting injections while she is in the facility to help her with pain management in her knees. Ginna states she otherwise has not had any problems. She has not had any issues with chest symptoms or lightheadedness/dizziness. She has been eating, drinking and eliminating well. Ginna is looking forward to getting stronger and being able to get home. Ginna denies any other concerns including fevers/chills, cough or cold symptoms, headaches, vision changes, chest pain/pressure,  difficulty breathing, SOB, abdominal pain, nausea, vomiting, diarrhea, dysuria, increasing weakness, increasing pain.     Past Medical History:   Diagnosis Date     Atrial fibrillation with RVR (H)      CKD (chronic kidney disease) stage 3, GFR 30-59 ml/min      Disorder of ligament of foot 2016     Essential hypertension 2016     Family history of colon cancer     Declines having colonoscopy     Glaucoma      History of pulmonary embolus (PE) 2016    Factor V Leiden mutation, family history pulmonary embolus     LBBB (left bundle branch block)      Osteoarthritis of multiple joints 2016     Osteopenia     DXA 2018 T score -1.3, previous DXA normal      Peripheral edema 01/15/2018     Polyarthritis rheumatica (H)      Tachy-kayleen syndrome (H)      Vitamin D deficiency              Family History   Problem Relation Age of Onset     Pulmonary embolism Mother          at age 51     Heart attack Father          in his 70s     Colon cancer Brother              Kidney failure Sister      Prostate cancer Son      Hodgkin's lymphoma Son      Social History     Socioeconomic History     Marital status:      Spouse name: Not on file     Number of children: Not on file     Years of education: Not on file     Highest education level: Not on file   Occupational History     Not on file   Social Needs     Financial resource strain: Not on file     Food insecurity     Worry: Not on file     Inability: Not on file     Transportation needs     Medical: Not on file     Non-medical: Not on file   Tobacco Use     Smoking status: Never Smoker     Smokeless tobacco: Never Used   Substance and Sexual Activity     Alcohol use: Yes     Drug use: Not on file     Sexual activity: Not on file   Lifestyle     Physical activity     Days per week: Not on file     Minutes per session: Not on file     Stress: Not on file   Relationships     Social connections     Talks on phone: Not on file      Gets together: Not on file     Attends Advent service: Not on file     Active member of club or organization: Not on file     Attends meetings of clubs or organizations: Not on file     Relationship status: Not on file     Intimate partner violence     Fear of current or ex partner: Not on file     Emotionally abused: Not on file     Physically abused: Not on file     Forced sexual activity: Not on file   Other Topics Concern     Not on file   Social History Narrative     2001  Rod    6 sons and multiple grandchildren, also has great grandchildren.       REVIEW OF SYSTEM:  Per HPI    PHYSICAL EXAM:   /69   Pulse 70   Temp 96.9  F (36.1  C)   Resp 18   Wt 176 lb (79.8 kg)   SpO2 97%   BMI 29.74 kg/m      A limited exam was performed due to recommendations for care during COVID-19 pandemic. Due to the 2020 COVID-19 pandemic, except as noted above, the patient was visually observed at a 6 foot plus distance.  An observational exam was performed in an effort to keep patient safe from COVID-19 and other communicable diseases.     General appearance: alert, appears stated age and cooperative  HEENT: Head is normocephalic with normal hair distribution. No evidence of trauma. Ears: Without lesions or deformity. No acute purulent discharge. Eyes: Conjunctivae pink with no scleral icterus or erythema. Nose: Normal. Oropharnyx: mmm.  Lungs: respirations without effort.  Extremities: extremities normal, atraumatic, no cyanosis.  Skin: Skin color, texture normal. No rashes or lesions on exposed skin.   Neurologic: Grossly normal   Psych: interacts well with caregivers, exhibits logical thought processes and connections, pleasant.      LABS:   None today.     ASSESSMENT:      ICD-10-CM    1. Physical deconditioning  R53.81    2. Primary osteoarthritis involving multiple joints  M89.49    3. Multiple closed fractures of pelvis without disruption of pelvic ring with routine healing  S32.82XD    4.  "Tachy-kayleen syndrome (H)  I49.5    5. Status post biventricular cardiac pacemaker insertion  Z95.0        PLAN:    Care plan reviewed and remains appropriate. Therapies to continue.     Physical Deconditioning  -Continue PT/OT and other therapies as per care plan.  -Encouraged good nutrition and movement habits.   -Discussed care plan and expected course of stay.   -Continue to follow-up per routine schedule or sooner if needed.     Tachy kayleen Syndrome, s/p Pacemaker Insertions  -To follow with cardiology.   -Metoprolol 100 mg by mouth daily.   -Vasotec 10 mg by mouth two times a day.     Pelvis Fractures  -Tylenol 325 mg by mouth every 4 hours as needed.   -Oxycodone 2.5 mg by mouth every 6 hours as needed.     Osteoarthritis  -Bilateral knee x rays.   -If appropriate for injections, order the following supplies:   Kenalog 40 mg x 2 bottles, Lidocaine 2% 20 ml bottle, draw needles, two 1.5\" 25 g needles, two 10 ml syringes, 6 betadine swabs.   -Follow up with xrays.     Admission history and physical per MD in the next 30 days. At this time continue current care plan for all chronic medical conditions, as they are stable. Encouraged patient to engage in PT/OT for strengthening and conditioning. Encouraged patient to work closely with nursing staff to ensure any medical complaints are quickly addressed. Follow up this week or sooner if needed. Will continue to monitor patient and work with nursing staff collaboratively to work toward positive patient outcomes.    Electronically signed by: Lybuov Hall CNP  "

## 2021-06-12 NOTE — PROGRESS NOTES
[facility] Patient Outreach:     Discharge Date::  10/28/20      Reason for TCU stay (discharge diagnosis)::  Tachy-kayleen syndrome, pacemaker placement, pelvic fx      Are you feeling better, the same or worse since your discharge?:  Patient is feeling better          As part of your discharge plan, did they discuss home care with you?: Yes        Have your seen them yet, or are they scheduled to visit?: Yes                Do you have any follow up visits scheduled with your PCP or Specialist?:  Yes, with Specialist      Who are you seeing and when is it scheduled?:  Seeing cardiac surgery 10/29/20.        I'm glad to hear you're doing well and we want you to continue to do well. Your PCP would like to see you for a follow-up visit. Can we help set that up for your today?: No        (RN) Provided patient the PCP's phone number to call if they have any questions or concerns?: No (Patient is seeing PCP on 11/9/20.  )

## 2021-06-12 NOTE — PROGRESS NOTES
[facility]    Facility:   [facility] [[id number]]   Code Status: FULL CODE and POLST AVAILABLE      CHIEF COMPLAINT/REASON FOR VISIT:  Chief Complaint   Patient presents with     Review Of Multiple Medical Conditions     physical deconditioning, tachy-kayleen syndrome, s/p pacemaker insertion, fall, fractures       HISTORY:      HPI: Ginna is a 86 y.o. female who  has a past medical history of Atrial fibrillation with RVR (H), CKD (chronic kidney disease) stage 3, GFR 30-59 ml/min, Disorder of ligament of foot (11/28/2016), Essential hypertension (11/28/2016), Family history of colon cancer, Glaucoma, History of pulmonary embolus (PE) (11/28/2016), LBBB (left bundle branch block), Osteoarthritis of multiple joints (11/28/2016), Osteopenia, Peripheral edema (01/15/2018), Polyarthritis rheumatica (H), Tachy-kayleen syndrome (H), and Vitamin D deficiency.  1 Cristal was recently admitted to [facility] from September 20, 2020 and was discharged on September 29, 2020.  She was admitted for tachy-kayleen syndrome and a pacemaker was placed.  The discharging provider summarized the hospitalization in previous notes.     Today when he is being evaluated for a routine review of multiple medical problems while in the TCU. Ginna shares she has been doing well. She does not have any acute concerns. She is starting to settle in. No more diarrhea. She has been working with therapies, states they make her tired and that after sessions she is a bit fatigued-- however she will rest and feels fine after a few hours. She has not had any palpitations, no lightheadedness, no tachycardia. Ginna has not had much pain. She is feeling overall good. She has been eating, drinking and eliminating well. Ginna denies any other concerns including fevers/chills, cough or cold symptoms, headaches, vision changes, chest pain/pressure, difficulty breathing, SOB, abdominal pain, nausea, vomiting, diarrhea,  dysuria, increasing weakness, increasing pain.     Past Medical History:   Diagnosis Date     Atrial fibrillation with RVR (H)      CKD (chronic kidney disease) stage 3, GFR 30-59 ml/min      Disorder of ligament of foot 2016     Essential hypertension 2016     Family history of colon cancer     Declines having colonoscopy     Glaucoma      History of pulmonary embolus (PE) 2016    Factor V Leiden mutation, family history pulmonary embolus     LBBB (left bundle branch block)      Osteoarthritis of multiple joints 2016     Osteopenia     DXA 2018 T score -1.3, previous DXA normal      Peripheral edema 01/15/2018     Polyarthritis rheumatica (H)      Tachy-kayleen syndrome (H)      Vitamin D deficiency              Family History   Problem Relation Age of Onset     Pulmonary embolism Mother          at age 51     Heart attack Father          in his 70s     Colon cancer Brother              Kidney failure Sister      Prostate cancer Son      Hodgkin's lymphoma Son      Social History     Socioeconomic History     Marital status:      Spouse name: Not on file     Number of children: Not on file     Years of education: Not on file     Highest education level: Not on file   Occupational History     Not on file   Social Needs     Financial resource strain: Not on file     Food insecurity     Worry: Not on file     Inability: Not on file     Transportation needs     Medical: Not on file     Non-medical: Not on file   Tobacco Use     Smoking status: Never Smoker     Smokeless tobacco: Never Used   Substance and Sexual Activity     Alcohol use: Yes     Drug use: Not on file     Sexual activity: Not on file   Lifestyle     Physical activity     Days per week: Not on file     Minutes per session: Not on file     Stress: Not on file   Relationships     Social connections     Talks on phone: Not on file     Gets together: Not on file     Attends Jew service: Not on file      Active member of club or organization: Not on file     Attends meetings of clubs or organizations: Not on file     Relationship status: Not on file     Intimate partner violence     Fear of current or ex partner: Not on file     Emotionally abused: Not on file     Physically abused: Not on file     Forced sexual activity: Not on file   Other Topics Concern     Not on file   Social History Narrative     2001  Rod    6 sons and multiple grandchildren, also has great grandchildren.       REVIEW OF SYSTEM:  Per HPI    PHYSICAL EXAM:   /65   Pulse 70   Temp 97.7  F (36.5  C)   Resp 18   SpO2 99%     A limited exam was performed due to recommendations for care during COVID-19 pandemic. Due to the 2020 COVID-19 pandemic, except as noted above, the patient was visually observed at a 6 foot plus distance.  An observational exam was performed in an effort to keep patient safe from COVID-19 and other communicable diseases.     General appearance: alert, appears stated age and cooperative  HEENT: Head is normocephalic with normal hair distribution. No evidence of trauma. Ears: Without lesions or deformity. No acute purulent discharge. Eyes: Conjunctivae pink with no scleral icterus or erythema. Nose: Normal. Oropharnyx: mmm.  Lungs: respirations without effort.  Extremities: extremities normal, atraumatic, no cyanosis.  Skin: Skin color, texture normal. No rashes or lesions on exposed skin.   Neurologic: Grossly normal   Psych: interacts well with caregivers, exhibits logical thought processes and connections, pleasant.      LABS:   None today.     ASSESSMENT:      ICD-10-CM    1. Physical deconditioning  R53.81    2. Status post biventricular cardiac pacemaker insertion  Z95.0    3. Tachy-kayleen syndrome (H)  I49.5    4. Multiple closed fractures of pelvis without disruption of pelvic ring with routine healing  S32.82XD        PLAN:    Care plan reviewed and remains appropriate.     Physical  Deconditioning  -Continue PT/OT and other therapies as per care plan.  -Encouraged good nutrition and movement habits.   -Discussed care plan and expected course of stay.   -Continue to follow-up per routine schedule or sooner if needed.     Tachy kayleen Syndrome, s/p Pacemaker Insertions  -To follow with cardiology.   -Metoprolol 100 mg by mouth daily.   -Vasotec 10 mg by mouth two times a day.     Pelvis Fractures  -Tylenol 325 mg by mouth every 4 hours as needed.   -Oxycodone 2.5 mg by mouth every 6 hours as needed.     Anticoagulation Management  -Hold coumadin.   -INR on Monday.     Admission history and physical per MD in the next 30 days. At this time continue current care plan for all chronic medical conditions, as they are stable. Encouraged patient to engage in PT/OT for strengthening and conditioning. Encouraged patient to work closely with nursing staff to ensure any medical complaints are quickly addressed. Follow up this week or sooner if needed. Will continue to monitor patient and work with nursing staff collaboratively to work toward positive patient outcomes.    Electronically signed by: Lyubov Hall CNP

## 2021-06-12 NOTE — PROGRESS NOTES
[facility]    Facility:   [facility] [[id number]]   Code Status: FULL CODE and POLST AVAILABLE      CHIEF COMPLAINT/REASON FOR VISIT:  Chief Complaint   Patient presents with     Review Of Multiple Medical Conditions     physical deconditioning, tachy-kayleen syndrome, pacemaker insertion, fall, pelvic fx       HISTORY:      HPI: Ginna is a 86 y.o. female who  has a past medical history of Atrial fibrillation with RVR (H), CKD (chronic kidney disease) stage 3, GFR 30-59 ml/min, Disorder of ligament of foot (11/28/2016), Essential hypertension (11/28/2016), Family history of colon cancer, Glaucoma, History of pulmonary embolus (PE) (11/28/2016), LBBB (left bundle branch block), Osteoarthritis of multiple joints (11/28/2016), Osteopenia, Peripheral edema (01/15/2018), Polyarthritis rheumatica (H), Tachy-kayleen syndrome (H), and Vitamin D deficiency.  1 Cristal was recently admitted to [facility] from September 20, 2020 and was discharged on September 29, 2020.  She was admitted for tachy-kayleen syndrome and a pacemaker was placed.  The discharging provider summarized the hospitalization in previous notes.     Today Ginna is being evaluated for a routine review of multiple medical problems while in the transitional care unit.  Ginna shares she continues to do well. She has continued to progress therapies. Ginna has been participating in activities in the facility. No new concerns or issues to report. She has been eating, drinking and eliminating well. Discharge is pending and should be towards the end of this week. Ginna denies any other concerns including fevers/chills, cough or cold symptoms, headaches, vision changes, chest pain/pressure, difficulty breathing, SOB, abdominal pain, nausea, vomiting, diarrhea, dysuria, increasing weakness, increasing pain.     Past Medical History:   Diagnosis Date     Atrial fibrillation with RVR (H)      CKD (chronic kidney disease) stage 3,  GFR 30-59 ml/min      Disorder of ligament of foot 2016     Essential hypertension 2016     Family history of colon cancer     Declines having colonoscopy     Glaucoma      History of pulmonary embolus (PE) 2016    Factor V Leiden mutation, family history pulmonary embolus     LBBB (left bundle branch block)      Osteoarthritis of multiple joints 2016     Osteopenia     DXA 2018 T score -1.3, previous DXA normal      Peripheral edema 01/15/2018     Polyarthritis rheumatica (H)      Tachy-kayleen syndrome (H)      Vitamin D deficiency              Family History   Problem Relation Age of Onset     Pulmonary embolism Mother          at age 51     Heart attack Father          in his 70s     Colon cancer Brother              Kidney failure Sister      Prostate cancer Son      Hodgkin's lymphoma Son      Social History     Socioeconomic History     Marital status:      Spouse name: Not on file     Number of children: Not on file     Years of education: Not on file     Highest education level: Not on file   Occupational History     Not on file   Social Needs     Financial resource strain: Not on file     Food insecurity     Worry: Not on file     Inability: Not on file     Transportation needs     Medical: Not on file     Non-medical: Not on file   Tobacco Use     Smoking status: Never Smoker     Smokeless tobacco: Never Used   Substance and Sexual Activity     Alcohol use: Yes     Drug use: Not on file     Sexual activity: Not on file   Lifestyle     Physical activity     Days per week: Not on file     Minutes per session: Not on file     Stress: Not on file   Relationships     Social connections     Talks on phone: Not on file     Gets together: Not on file     Attends Quaker service: Not on file     Active member of club or organization: Not on file     Attends meetings of clubs or organizations: Not on file     Relationship status: Not on file     Intimate  partner violence     Fear of current or ex partner: Not on file     Emotionally abused: Not on file     Physically abused: Not on file     Forced sexual activity: Not on file   Other Topics Concern     Not on file   Social History Narrative     2001  Rod    6 sons and multiple grandchildren, also has great grandchildren.       REVIEW OF SYSTEM:  Per HPI    PHYSICAL EXAM:   /80   Pulse 70   Temp 98.2  F (36.8  C)   Resp 18   Wt 169 lb 12.8 oz (77 kg)   SpO2 97%   BMI 28.70 kg/m      A limited exam was performed due to recommendations for care during COVID-19 pandemic. Due to the 2020 COVID-19 pandemic, except as noted above, the patient was visually observed at a 6 foot plus distance.  An observational exam was performed in an effort to keep patient safe from COVID-19 and other communicable diseases.     General appearance: alert, appears stated age and cooperative  HEENT: Head is normocephalic with normal hair distribution. No evidence of trauma. Ears: Without lesions or deformity. No acute purulent discharge. Eyes: Conjunctivae pink with no scleral icterus or erythema. Nose: Normal. Oropharnyx: mmm.  Lungs: respirations without effort.  Extremities: extremities normal, atraumatic, no cyanosis.  Skin: Skin color, texture normal. No rashes or lesions on exposed skin.   Neurologic: Grossly normal   Psych: interacts well with caregivers, exhibits logical thought processes and connections, pleasant.      LABS:   None today.     ASSESSMENT:      ICD-10-CM    1. Status post biventricular cardiac pacemaker insertion  Z95.0    2. Tachy-kayleen syndrome (H)  I49.5    3. Physical deconditioning  R53.81    4. Multiple closed fractures of pelvis without disruption of pelvic ring with routine healing  S32.82XD        PLAN:    Discharge planning has begun and it expected discharge later this week.     Physical Deconditioning  -Continue PT/OT and other therapies as per care plan.  -Encouraged good  nutrition and movement habits.   -Discussed care plan and expected course of stay.   -Continue to follow-up per routine schedule or sooner if needed.     Tachy kayleen Syndrome, s/p Pacemaker Insertions  -To follow with cardiology.   -Metoprolol 100 mg by mouth daily.   -Vasotec 10 mg by mouth two times a day.     Pelvis Fractures  -Tylenol 325 mg by mouth every 4 hours as needed.   -Oxycodone 2.5 mg by mouth every 6 hours as needed.     Otherwise continue current care plan for all other chronic medical conditions, as they are stable. Encouraged patient to engage in healthy lifestyle behaviors such as engaging in social activities, exercising (PT/OT), eating well, and following care plan. Follow up for routine check-up, or sooner if needed. Will continue to monitor patient and work with nursing staff collaboratively to work toward positive patient outcomes.    Electronically signed by: Lyubov Hall CNP

## 2021-06-12 NOTE — PROGRESS NOTES
[facility]    Facility:   [facility] [[id number]]   Code Status: FULL CODE and POLST AVAILABLE      CHIEF COMPLAINT/REASON FOR VISIT:  Chief Complaint   Patient presents with     Review Of Multiple Medical Conditions     Physical deconditioning, tachybradycardia syndrome, status post pacemaker insertion, fall, pelvis fracture       HISTORY:      HPI: Ginna is a 86 y.o. female who  has a past medical history of Atrial fibrillation with RVR (H), CKD (chronic kidney disease) stage 3, GFR 30-59 ml/min, Disorder of ligament of foot (11/28/2016), Essential hypertension (11/28/2016), Family history of colon cancer, Glaucoma, History of pulmonary embolus (PE) (11/28/2016), LBBB (left bundle branch block), Osteoarthritis of multiple joints (11/28/2016), Osteopenia, Peripheral edema (01/15/2018), Polyarthritis rheumatica (H), Tachy-kayleen syndrome (H), and Vitamin D deficiency.  1 Cristal was recently admitted to [facility] from September 20, 2020 and was discharged on September 29, 2020.  She was admitted for tachy-kayleen syndrome and a pacemaker was placed.  The discharging provider summarized the hospitalization in previous notes.     Today Ginna is being evaluated for a routine review of multiple medical problems while in the transitional care unit.  She continues to report that she is doing quite well.  She has no new concerns or issues to report.  She does state that therapies are going very well.  She is very happy with her current level of accomplishment.  She has been able to walk up stairs.  She states that she has not been able to walk up and down stairs.  However she feels that that is acceptable.  She will continue to work on stairs.  She is looking to be safe in her home.  She does not have any new aches or pains.  She has not had any pelvic pain since arrival.  She continues to eat, drink and eliminate well.  No other concerns per nursing staff.  Ginna denies any other  concerns including fevers/chills, cough or cold symptoms, headaches, vision changes, chest pain/pressure, difficulty breathing, SOB, abdominal pain, nausea, vomiting, diarrhea, dysuria, increasing weakness, increasing pain.     Past Medical History:   Diagnosis Date     Atrial fibrillation with RVR (H)      CKD (chronic kidney disease) stage 3, GFR 30-59 ml/min      Disorder of ligament of foot 2016     Essential hypertension 2016     Family history of colon cancer     Declines having colonoscopy     Glaucoma      History of pulmonary embolus (PE) 2016    Factor V Leiden mutation, family history pulmonary embolus     LBBB (left bundle branch block)      Osteoarthritis of multiple joints 2016     Osteopenia     DXA 2018 T score -1.3, previous DXA normal      Peripheral edema 01/15/2018     Polyarthritis rheumatica (H)      Tachy-kayleen syndrome (H)      Vitamin D deficiency              Family History   Problem Relation Age of Onset     Pulmonary embolism Mother          at age 51     Heart attack Father          in his 70s     Colon cancer Brother              Kidney failure Sister      Prostate cancer Son      Hodgkin's lymphoma Son      Social History     Socioeconomic History     Marital status:      Spouse name: Not on file     Number of children: Not on file     Years of education: Not on file     Highest education level: Not on file   Occupational History     Not on file   Social Needs     Financial resource strain: Not on file     Food insecurity     Worry: Not on file     Inability: Not on file     Transportation needs     Medical: Not on file     Non-medical: Not on file   Tobacco Use     Smoking status: Never Smoker     Smokeless tobacco: Never Used   Substance and Sexual Activity     Alcohol use: Yes     Drug use: Not on file     Sexual activity: Not on file   Lifestyle     Physical activity     Days per week: Not on file     Minutes per session: Not  on file     Stress: Not on file   Relationships     Social connections     Talks on phone: Not on file     Gets together: Not on file     Attends Christianity service: Not on file     Active member of club or organization: Not on file     Attends meetings of clubs or organizations: Not on file     Relationship status: Not on file     Intimate partner violence     Fear of current or ex partner: Not on file     Emotionally abused: Not on file     Physically abused: Not on file     Forced sexual activity: Not on file   Other Topics Concern     Not on file   Social History Narrative     2001  Rod    6 sons and multiple grandchildren, also has great grandchildren.       REVIEW OF SYSTEM:  Per HPI    PHYSICAL EXAM:   /79   Pulse 73   Temp 97.7  F (36.5  C)   Resp 17   Wt 169 lb 12.8 oz (77 kg)   SpO2 99%   BMI 28.70 kg/m      A limited exam was performed due to recommendations for care during COVID-19 pandemic. Due to the 2020 COVID-19 pandemic, except as noted above, the patient was visually observed at a 6 foot plus distance.  An observational exam was performed in an effort to keep patient safe from COVID-19 and other communicable diseases.     General appearance: alert, appears stated age and cooperative  HEENT: Head is normocephalic with normal hair distribution. No evidence of trauma. Ears: Without lesions or deformity. No acute purulent discharge. Eyes: Conjunctivae pink with no scleral icterus or erythema. Nose: Normal. Oropharnyx: mmm.  Lungs: respirations without effort.  Extremities: extremities normal, atraumatic, no cyanosis.  Skin: Skin color, texture normal. No rashes or lesions on exposed skin.   Neurologic: Grossly normal   Psych: interacts well with caregivers, exhibits logical thought processes and connections, pleasant.      LABS:   None today.     ASSESSMENT:      ICD-10-CM    1. Physical deconditioning  R53.81    2. Status post biventricular cardiac pacemaker insertion  Z95.0     3. Tachy-kayleen syndrome (H)  I49.5    4. Multiple closed fractures of pelvis without disruption of pelvic ring with routine healing  S32.82XD        PLAN:    Patient continues to do quite well and is nearing baseline, is working on steps to ensure safety in the home.  Discharge planning to commence.    Physical Deconditioning  -Continue PT/OT and other therapies as per care plan.  -Encouraged good nutrition and movement habits.   -Discussed care plan and expected course of stay.   -Continue to follow-up per routine schedule or sooner if needed.     Tachy kayleen Syndrome, s/p Pacemaker Insertions  -To follow with cardiology.   -Metoprolol 100 mg by mouth daily.   -Vasotec 10 mg by mouth two times a day.     Pelvis Fractures  -Tylenol 325 mg by mouth every 4 hours as needed.   -Oxycodone 2.5 mg by mouth every 6 hours as needed.     Otherwise continue current care plan for all other chronic medical conditions, as they are stable. Encouraged patient to engage in healthy lifestyle behaviors such as engaging in social activities, exercising (PT/OT), eating well, and following care plan. Follow up for routine check-up, or sooner if needed. Will continue to monitor patient and work with nursing staff collaboratively to work toward positive patient outcomes.    Electronically signed by: Lyubov Hall CNP

## 2021-06-12 NOTE — TELEPHONE ENCOUNTER
ANTICOAGULATION  MANAGEMENT    Assessment     Today's INR result of 2.9 is Therapeutic (goal INR of 2.0-3.0)        Per Raeann patient has been taking 1 mg alternating with 2 mg dose since she was discharged from TCU    No new diet changes affecting INR     OV today and noted new rx: alendronate    No interaction expected between alendronate and warfarin    Continues to tolerate warfarin with no reported s/s of bleeding or thromboembolism     Previous INR was Therapeutic    Plan:     Spoke on phone with patient's son Raeann with patient  regarding INR result and instructed:     Warfarin Dosing Instructions:  continue current doses taken    1 mg, then 2 mg repeating every 2 days      (0 % change)    Instructed patient to follow up no later than: 2 weeks appointment made.    Education provided: importance of therapeutic range, target INR goal and significance of current INR result and importance of taking warfarin as instructed    Raeann verbalizes understanding and agrees to warfarin dosing plan.    Instructed to call the [facility] for any changes, questions or concerns. (#[phone])   ?   Ольга Forbes RN    Subjective/Objective:      Ginna Quiroga, a 86 y.o. female is on warfarin.     Ginna reports:     Home warfarin dose: see above     Missed doses: No     Medication changes:  No     S/S of bleeding or thromboembolism:  No     New Injury or illness:  No     Changes in diet or alcohol consumption:  No     Upcoming surgery, procedure or cardioversion:  No    Anticoagulation Episode Summary     Current INR goal:  2.0-3.0   TTR:  89.0 % (1 y)   Next INR check:  11/23/2020   INR from last check:  2.90 (11/9/2020)   Weekly max warfarin dose:     Target end date:     INR check location:     Preferred lab:     Send INR reminders to:  [facility]    Indications    Long term current use of anticoagulant therapy [Z79.01]  DVT (deep venous thrombosis) (H) (Resolved) [I82.409]           Comments:            Anticoagulation Care Providers     Provider Role Specialty Phone number    Dawson Cloud MD Referring Internal Medicine [phone]

## 2021-06-12 NOTE — PROGRESS NOTES
[facility]    Facility:   [facility] [[id number]]   Code Status: FULL CODE and POLST AVAILABLE      CHIEF COMPLAINT/REASON FOR VISIT:  Chief Complaint   Patient presents with     Review Of Multiple Medical Conditions     physical deconditioning, tachy-kayleen syndrome, s/p pacemaker, fall, pelvis fx       HISTORY:      HPI: Ginna is a 86 y.o. female who  has a past medical history of Atrial fibrillation with RVR (H), CKD (chronic kidney disease) stage 3, GFR 30-59 ml/min, Disorder of ligament of foot (11/28/2016), Essential hypertension (11/28/2016), Family history of colon cancer, Glaucoma, History of pulmonary embolus (PE) (11/28/2016), LBBB (left bundle branch block), Osteoarthritis of multiple joints (11/28/2016), Osteopenia, Peripheral edema (01/15/2018), Polyarthritis rheumatica (H), Tachy-kayleen syndrome (H), and Vitamin D deficiency.  1 Cristal was recently admitted to [facility] from September 20, 2020 and was discharged on September 29, 2020.  She was admitted for tachy-kayleen syndrome and a pacemaker was placed.  The discharging provider summarized the hospitalization in previous notes.     Today when he is being evaluated for a routine review of multiple medical problems while in the TCU. Ginna shares she is doing well, she is having some fatigue but contributes this to working out with therapies. Ginna has no new concerns or problems. She is very happy because it is her birthday. Her family has set up a plan to have a parade outside of her window and also to send her several balloons, shay, etc. Ginna states that this is made her very happy and determined in therapies.  She states that this is the reason why she is working so hard to get home.  She has no problems with eating or drinking.  She states that she is aluminating without an issue.  Ginna denies any other concerns including fevers/chills, cough or cold symptoms, headaches, vision changes, chest  pain/pressure, difficulty breathing, SOB, abdominal pain, nausea, vomiting, diarrhea, dysuria, increasing weakness, increasing pain.     Past Medical History:   Diagnosis Date     Atrial fibrillation with RVR (H)      CKD (chronic kidney disease) stage 3, GFR 30-59 ml/min      Disorder of ligament of foot 2016     Essential hypertension 2016     Family history of colon cancer     Declines having colonoscopy     Glaucoma      History of pulmonary embolus (PE) 2016    Factor V Leiden mutation, family history pulmonary embolus     LBBB (left bundle branch block)      Osteoarthritis of multiple joints 2016     Osteopenia     DXA 2018 T score -1.3, previous DXA normal      Peripheral edema 01/15/2018     Polyarthritis rheumatica (H)      Tachy-kayleen syndrome (H)      Vitamin D deficiency              Family History   Problem Relation Age of Onset     Pulmonary embolism Mother          at age 51     Heart attack Father          in his 70s     Colon cancer Brother              Kidney failure Sister      Prostate cancer Son      Hodgkin's lymphoma Son      Social History     Socioeconomic History     Marital status:      Spouse name: Not on file     Number of children: Not on file     Years of education: Not on file     Highest education level: Not on file   Occupational History     Not on file   Social Needs     Financial resource strain: Not on file     Food insecurity     Worry: Not on file     Inability: Not on file     Transportation needs     Medical: Not on file     Non-medical: Not on file   Tobacco Use     Smoking status: Never Smoker     Smokeless tobacco: Never Used   Substance and Sexual Activity     Alcohol use: Yes     Drug use: Not on file     Sexual activity: Not on file   Lifestyle     Physical activity     Days per week: Not on file     Minutes per session: Not on file     Stress: Not on file   Relationships     Social connections     Talks on phone:  Not on file     Gets together: Not on file     Attends Restorationism service: Not on file     Active member of club or organization: Not on file     Attends meetings of clubs or organizations: Not on file     Relationship status: Not on file     Intimate partner violence     Fear of current or ex partner: Not on file     Emotionally abused: Not on file     Physically abused: Not on file     Forced sexual activity: Not on file   Other Topics Concern     Not on file   Social History Narrative     2001  Rod    6 sons and multiple grandchildren, also has great grandchildren.       REVIEW OF SYSTEM:  Per HPI    PHYSICAL EXAM:   /79   Pulse 70   Temp 97.7  F (36.5  C)   Resp 17   Wt 172 lb (78 kg)   SpO2 97%   BMI 29.07 kg/m      A limited exam was performed due to recommendations for care during COVID-19 pandemic. Due to the 2020 COVID-19 pandemic, except as noted above, the patient was visually observed at a 6 foot plus distance.  An observational exam was performed in an effort to keep patient safe from COVID-19 and other communicable diseases.     General appearance: alert, appears stated age and cooperative  HEENT: Head is normocephalic with normal hair distribution. No evidence of trauma. Ears: Without lesions or deformity. No acute purulent discharge. Eyes: Conjunctivae pink with no scleral icterus or erythema. Nose: Normal. Oropharnyx: mmm.  Lungs: respirations without effort.  Extremities: extremities normal, atraumatic, no cyanosis.  Skin: Skin color, texture normal. No rashes or lesions on exposed skin.   Neurologic: Grossly normal   Psych: interacts well with caregivers, exhibits logical thought processes and connections, pleasant.      LABS:   None today.     ASSESSMENT:      ICD-10-CM    1. Physical deconditioning  R53.81    2. Status post biventricular cardiac pacemaker insertion  Z95.0    3. Tachy-kayleen syndrome (H)  I49.5    4. Multiple closed fractures of pelvis without disruption  of pelvic ring with routine healing  S32.82XD        PLAN:    Care plan reviewed and remains appropriate. Therapies to continue.     Physical Deconditioning  -Continue PT/OT and other therapies as per care plan.  -Encouraged good nutrition and movement habits.   -Discussed care plan and expected course of stay.   -Continue to follow-up per routine schedule or sooner if needed.     Tachy kayleen Syndrome, s/p Pacemaker Insertions  -To follow with cardiology.   -Metoprolol 100 mg by mouth daily.   -Vasotec 10 mg by mouth two times a day.     Pelvis Fractures  -Tylenol 325 mg by mouth every 4 hours as needed.   -Oxycodone 2.5 mg by mouth every 6 hours as needed.     Anticoagulation Management  -Coumadin dosed.  -INR now low.     Admission history and physical per MD in the next 30 days. At this time continue current care plan for all chronic medical conditions, as they are stable. Encouraged patient to engage in PT/OT for strengthening and conditioning. Encouraged patient to work closely with nursing staff to ensure any medical complaints are quickly addressed. Follow up this week or sooner if needed. Will continue to monitor patient and work with nursing staff collaboratively to work toward positive patient outcomes.    Electronically signed by: Lyubov Hall CNP

## 2021-06-12 NOTE — PROGRESS NOTES
[facility]    Facility:   [facility] [[id number]]   Code Status: FULL CODE and POLST AVAILABLE      CHIEF COMPLAINT/REASON FOR VISIT:  Chief Complaint   Patient presents with     Review Of Multiple Medical Conditions     Physical deconditioning, tachybradycardia syndrome, status post pacemaker insertion, fall, pelvic fracture     INR Check       HISTORY:      HPI: Ginna is a 86 y.o. female who  has a past medical history of Atrial fibrillation with RVR (H), CKD (chronic kidney disease) stage 3, GFR 30-59 ml/min, Disorder of ligament of foot (11/28/2016), Essential hypertension (11/28/2016), Family history of colon cancer, Glaucoma, History of pulmonary embolus (PE) (11/28/2016), LBBB (left bundle branch block), Osteoarthritis of multiple joints (11/28/2016), Osteopenia, Peripheral edema (01/15/2018), Polyarthritis rheumatica (H), Tachy-kayleen syndrome (H), and Vitamin D deficiency.  1 Cristal was recently admitted to [facility] from September 20, 2020 and was discharged on September 29, 2020.  She was admitted for tachy-kayleen syndrome and a pacemaker was placed.  The discharging provider summarized the hospitalization in previous notes.     Today Ginna is being evaluated for a routine review of multiple medical problems while in the transitional care unit.  Ye states that she has been doing quite well.  She has been going up and down stairs.  She feels very stable and strong with the therapies she has undergone.  She is looking forward to going home soon.  She does not have any significant pain.  She uses Tylenol when she has any breakthrough pain.  She states this is very rare however.  She has been eating, drinking and eliminating well.  She also is being evaluated for anticoagulation management today.  She has an INR goal of 2-3, she is at goal today.  She denies any bleeding, bruising, dark black tarry stools.  Ginna denies any other concerns including fevers/chills,  cough or cold symptoms, headaches, vision changes, chest pain/pressure, difficulty breathing, SOB, abdominal pain, nausea, vomiting, diarrhea, dysuria, increasing weakness, increasing pain.     Past Medical History:   Diagnosis Date     Atrial fibrillation with RVR (H)      CKD (chronic kidney disease) stage 3, GFR 30-59 ml/min      Disorder of ligament of foot 2016     Essential hypertension 2016     Family history of colon cancer     Declines having colonoscopy     Glaucoma      History of pulmonary embolus (PE) 2016    Factor V Leiden mutation, family history pulmonary embolus     LBBB (left bundle branch block)      Osteoarthritis of multiple joints 2016     Osteopenia     DXA 2018 T score -1.3, previous DXA normal      Peripheral edema 01/15/2018     Polyarthritis rheumatica (H)      Tachy-kayleen syndrome (H)      Vitamin D deficiency              Family History   Problem Relation Age of Onset     Pulmonary embolism Mother          at age 51     Heart attack Father          in his 70s     Colon cancer Brother              Kidney failure Sister      Prostate cancer Son      Hodgkin's lymphoma Son      Social History     Socioeconomic History     Marital status:      Spouse name: Not on file     Number of children: Not on file     Years of education: Not on file     Highest education level: Not on file   Occupational History     Not on file   Social Needs     Financial resource strain: Not on file     Food insecurity     Worry: Not on file     Inability: Not on file     Transportation needs     Medical: Not on file     Non-medical: Not on file   Tobacco Use     Smoking status: Never Smoker     Smokeless tobacco: Never Used   Substance and Sexual Activity     Alcohol use: Yes     Drug use: Not on file     Sexual activity: Not on file   Lifestyle     Physical activity     Days per week: Not on file     Minutes per session: Not on file     Stress: Not on file    Relationships     Social connections     Talks on phone: Not on file     Gets together: Not on file     Attends Jain service: Not on file     Active member of club or organization: Not on file     Attends meetings of clubs or organizations: Not on file     Relationship status: Not on file     Intimate partner violence     Fear of current or ex partner: Not on file     Emotionally abused: Not on file     Physically abused: Not on file     Forced sexual activity: Not on file   Other Topics Concern     Not on file   Social History Narrative     2001  Rod    6 sons and multiple grandchildren, also has great grandchildren.       REVIEW OF SYSTEM:  Per HPI    PHYSICAL EXAM:   /88   Pulse 73   Temp 97.1  F (36.2  C)   Resp 18   Wt 171 lb 12.8 oz (77.9 kg)   SpO2 99%   BMI 29.03 kg/m      A limited exam was performed due to recommendations for care during COVID-19 pandemic. Due to the 2020 COVID-19 pandemic, except as noted above, the patient was visually observed at a 6 foot plus distance.  An observational exam was performed in an effort to keep patient safe from COVID-19 and other communicable diseases.     General appearance: alert, appears stated age and cooperative  HEENT: Head is normocephalic with normal hair distribution. No evidence of trauma. Ears: Without lesions or deformity. No acute purulent discharge. Eyes: Conjunctivae pink with no scleral icterus or erythema. Nose: Normal. Oropharnyx: mmm.  Lungs: respirations without effort.  Extremities: extremities normal, atraumatic, no cyanosis.  Skin: Skin color, texture normal. No rashes or lesions on exposed skin.   Neurologic: Grossly normal   Psych: interacts well with caregivers, exhibits logical thought processes and connections, pleasant.      LABS:   None today.     ASSESSMENT:      ICD-10-CM    1. Physical deconditioning  R53.81    2. Status post biventricular cardiac pacemaker insertion  Z95.0    3. Tachy-kayleen syndrome (H)   I49.5    4. Multiple closed fractures of pelvis without disruption of pelvic ring with routine healing  S32.82XD    5. Long term current use of anticoagulant therapy  Z79.01    6. Anticoagulation management encounter  Z51.81     Z79.01        PLAN:    Care plan reviewed and remains appropriate.  Therapies to continue.    Physical Deconditioning  -Continue PT/OT and other therapies as per care plan.  -Encouraged good nutrition and movement habits.   -Discussed care plan and expected course of stay.   -Continue to follow-up per routine schedule or sooner if needed.     Tachy kaylene Syndrome, s/p Pacemaker Insertions  -To follow with cardiology.   -Metoprolol 100 mg by mouth daily.   -Vasotec 10 mg by mouth two times a day.     Pelvis Fractures  -Tylenol 325 mg by mouth every 4 hours as needed.   -Oxycodone 2.5 mg by mouth every 6 hours as needed.     Anticoagulation Management, long-term use of anticoagulants  -INR at goal. Current INR is 2.7.  -Recheck INR in one week.   -Follow up as needed or per routine--report any bleeding to provider team immediately.     Otherwise continue current care plan for all other chronic medical conditions, as they are stable. Encouraged patient to engage in healthy lifestyle behaviors such as engaging in social activities, exercising (PT/OT), eating well, and following care plan. Follow up for routine check-up, or sooner if needed. Will continue to monitor patient and work with nursing staff collaboratively to work toward positive patient outcomes.    Electronically signed by: Lyubov Hall CNP

## 2021-06-12 NOTE — PROGRESS NOTES
[facility]    Facility:   [facility] [[id number]]   Code Status: FULL CODE and POLST AVAILABLE      CHIEF COMPLAINT/REASON FOR VISIT:  Chief Complaint   Patient presents with     Review Of Multiple Medical Conditions     physical deconditioning, s/p pacemaker insertion, tachy/kayleen syndrome, fall, pelvis fracture, OA       HISTORY:      HPI: Ginna is a 86 y.o. female who  has a past medical history of Atrial fibrillation with RVR (H), CKD (chronic kidney disease) stage 3, GFR 30-59 ml/min, Disorder of ligament of foot (11/28/2016), Essential hypertension (11/28/2016), Family history of colon cancer, Glaucoma, History of pulmonary embolus (PE) (11/28/2016), LBBB (left bundle branch block), Osteoarthritis of multiple joints (11/28/2016), Osteopenia, Peripheral edema (01/15/2018), Polyarthritis rheumatica (H), Tachy-kayleen syndrome (H), and Vitamin D deficiency.  1 Cristal was recently admitted to [facility] from September 20, 2020 and was discharged on September 29, 2020.  She was admitted for tachy-kayleen syndrome and a pacemaker was placed.  The discharging provider summarized the hospitalization in previous notes.     Today Ginna has been doing quite well. She did have a bump in the road when her knees were causing her a great deal of discomfort. She had corticosteroid injections into her knees on Friday and states they are significantly better already. She has been able to participate in therapies and feels they are going well. Ginna has not had any cardiovascular complaints. States she has not had any pain. Pacemaker insertion site is clean and dry. She has no pain or aches that are new. She has some pelvic pain at times. Ginna shares she has been eating, drinking and eliminating well. Ginna denies any other concerns including fevers/chills, cough or cold symptoms, headaches, vision changes, chest pain/pressure, difficulty breathing, SOB, abdominal pain, nausea,  vomiting, diarrhea, dysuria, increasing weakness, increasing pain.     Past Medical History:   Diagnosis Date     Atrial fibrillation with RVR (H)      CKD (chronic kidney disease) stage 3, GFR 30-59 ml/min      Disorder of ligament of foot 2016     Essential hypertension 2016     Family history of colon cancer     Declines having colonoscopy     Glaucoma      History of pulmonary embolus (PE) 2016    Factor V Leiden mutation, family history pulmonary embolus     LBBB (left bundle branch block)      Osteoarthritis of multiple joints 2016     Osteopenia     DXA 2018 T score -1.3, previous DXA normal      Peripheral edema 01/15/2018     Polyarthritis rheumatica (H)      Tachy-kayleen syndrome (H)      Vitamin D deficiency              Family History   Problem Relation Age of Onset     Pulmonary embolism Mother          at age 51     Heart attack Father          in his 70s     Colon cancer Brother              Kidney failure Sister      Prostate cancer Son      Hodgkin's lymphoma Son      Social History     Socioeconomic History     Marital status:      Spouse name: Not on file     Number of children: Not on file     Years of education: Not on file     Highest education level: Not on file   Occupational History     Not on file   Social Needs     Financial resource strain: Not on file     Food insecurity     Worry: Not on file     Inability: Not on file     Transportation needs     Medical: Not on file     Non-medical: Not on file   Tobacco Use     Smoking status: Never Smoker     Smokeless tobacco: Never Used   Substance and Sexual Activity     Alcohol use: Yes     Drug use: Not on file     Sexual activity: Not on file   Lifestyle     Physical activity     Days per week: Not on file     Minutes per session: Not on file     Stress: Not on file   Relationships     Social connections     Talks on phone: Not on file     Gets together: Not on file     Attends Anglican  service: Not on file     Active member of club or organization: Not on file     Attends meetings of clubs or organizations: Not on file     Relationship status: Not on file     Intimate partner violence     Fear of current or ex partner: Not on file     Emotionally abused: Not on file     Physically abused: Not on file     Forced sexual activity: Not on file   Other Topics Concern     Not on file   Social History Narrative     2001  Rod    6 sons and multiple grandchildren, also has great grandchildren.       REVIEW OF SYSTEM:  Per HPI    PHYSICAL EXAM:   /72   Pulse 70   Temp 97.2  F (36.2  C)   Resp 18   Wt 172 lb 9.6 oz (78.3 kg)   SpO2 96%   BMI 29.17 kg/m      A limited exam was performed due to recommendations for care during COVID-19 pandemic. Due to the 2020 COVID-19 pandemic, except as noted above, the patient was visually observed at a 6 foot plus distance.  An observational exam was performed in an effort to keep patient safe from COVID-19 and other communicable diseases.     General appearance: alert, appears stated age and cooperative  HEENT: Head is normocephalic with normal hair distribution. No evidence of trauma. Ears: Without lesions or deformity. No acute purulent discharge. Eyes: Conjunctivae pink with no scleral icterus or erythema. Nose: Normal. Oropharnyx: mmm.  Lungs: respirations without effort.  Extremities: extremities normal, atraumatic, no cyanosis.  Skin: Skin color, texture normal. No rashes or lesions on exposed skin.   Neurologic: Grossly normal   Psych: interacts well with caregivers, exhibits logical thought processes and connections, pleasant.      LABS:   None today.     ASSESSMENT:      ICD-10-CM    1. Physical deconditioning  R53.81    2. Primary osteoarthritis involving multiple joints  M89.49    3. Multiple closed fractures of pelvis without disruption of pelvic ring with routine healing  S32.82XD    4. Tachy-kayleen syndrome (H)  I49.5    5. Status  post biventricular cardiac pacemaker insertion  Z95.0        PLAN:    Patient continues to do well, therapies to continue.     Physical Deconditioning  -Continue PT/OT and other therapies as per care plan.  -Encouraged good nutrition and movement habits.   -Discussed care plan and expected course of stay.   -Continue to follow-up per routine schedule or sooner if needed.     Tachy kayleen Syndrome, s/p Pacemaker Insertions  -To follow with cardiology.   -Metoprolol 100 mg by mouth daily.   -Vasotec 10 mg by mouth two times a day.     Pelvis Fractures  -Tylenol 325 mg by mouth every 4 hours as needed.   -Oxycodone 2.5 mg by mouth every 6 hours as needed.     Osteoarthritis  -Significant improvement in pain and ability to participate in therapies.   -Follow up as needed.     Otherwise continue current care plan for all other chronic medical conditions, as they are stable. Encouraged patient to engage in healthy lifestyle behaviors such as engaging in social activities, exercising (PT/OT), eating well, and following care plan. Follow up for routine check-up, or sooner if needed. Will continue to monitor patient and work with nursing staff collaboratively to work toward positive patient outcomes.    Electronically signed by: Lyubov Hall CNP

## 2021-06-12 NOTE — TELEPHONE ENCOUNTER
Spoke with Bronson at [facility] and relayed message below from Dr. Cloud.  She verbalized understanding, but states that the referral was cancelled as the family declined the services.  There were no further questions at this time.  Tawnya MENSAH CMA/NICOL....................8:49 AM

## 2021-06-12 NOTE — TELEPHONE ENCOUNTER
Who is calling:  Bronson   Reason for Call:  Caller is just needing to know if Dawson Cloud MD will be the  for orders and home care services.   Date of last appointment with primary care:   Okay to leave a detailed message: Yes

## 2021-06-12 NOTE — TELEPHONE ENCOUNTER
[facility] Nurse Triage Telephone Note      Provider: KENDRICK Orellana  Facility: [facility]     Facility Type: TCU    Caller: Yong  Call Back Number:  [phone]    Allergies: Patient has no known allergies.    Reason for call: Nurse reporting BMP and Digoxin level results.  Notable meds:  Lasix 20mg daily, Metoprolol ER 100mg daily, Enalapril 10mg two times a day.  Patient does follow with cardiology.       Verbal Order/Direction given by Provider: Update labs to cardiology.      Provider giving order: MILLIE Beck    Verbal order given to: Neida Batista RN

## 2021-06-12 NOTE — PROGRESS NOTES
[facility]    Facility:   [facility] [[id number]]   Code Status: FULL CODE and POLST AVAILABLE      CHIEF COMPLAINT/REASON FOR VISIT:  Chief Complaint   Patient presents with     INR Check       HISTORY:      HPI: Ginna is a 86 y.o. female who  has a past medical history of Atrial fibrillation with RVR (H), CKD (chronic kidney disease) stage 3, GFR 30-59 ml/min, Disorder of ligament of foot (11/28/2016), Essential hypertension (11/28/2016), Family history of colon cancer, Glaucoma, History of pulmonary embolus (PE) (11/28/2016), LBBB (left bundle branch block), Osteoarthritis of multiple joints (11/28/2016), Osteopenia, Peripheral edema (01/15/2018), Polyarthritis rheumatica (H), Tachy-kayleen syndrome (H), and Vitamin D deficiency.  1 Cristal was recently admitted to [facility] from September 20, 2020 and was discharged on September 29, 2020.  She was admitted for tachy-kayleen syndrome and a pacemaker was placed.  The discharging provider summarized the hospitalization in previous notes.     Today Ginna is being evaluated for anticoagulation management. She has an INR goal of 2-3 for a history of PE and DVT. She has been unstable as of late-- likely due to diet change. She denies any uncontrolled bleeding, any active bleeding, any bruising, hematuria, epistaxis, blood in stools or black/dark/tarry stools. No other concerns noted.     Past Medical History:   Diagnosis Date     Atrial fibrillation with RVR (H)      CKD (chronic kidney disease) stage 3, GFR 30-59 ml/min      Disorder of ligament of foot 11/28/2016     Essential hypertension 11/28/2016     Family history of colon cancer     Declines having colonoscopy     Glaucoma      History of pulmonary embolus (PE) 11/28/2016    Factor V Leiden mutation, family history pulmonary embolus     LBBB (left bundle branch block)      Osteoarthritis of multiple joints 11/28/2016     Osteopenia     DXA March 2018 T score -1.3, previous  DXA normal 2009     Peripheral edema 01/15/2018     Polyarthritis rheumatica (H)      Tachy-kayleen syndrome (H)      Vitamin D deficiency              Family History   Problem Relation Age of Onset     Pulmonary embolism Mother          at age 51     Heart attack Father          in his 70s     Colon cancer Brother              Kidney failure Sister      Prostate cancer Son      Hodgkin's lymphoma Son      Social History     Socioeconomic History     Marital status:      Spouse name: Not on file     Number of children: Not on file     Years of education: Not on file     Highest education level: Not on file   Occupational History     Not on file   Social Needs     Financial resource strain: Not on file     Food insecurity     Worry: Not on file     Inability: Not on file     Transportation needs     Medical: Not on file     Non-medical: Not on file   Tobacco Use     Smoking status: Never Smoker     Smokeless tobacco: Never Used   Substance and Sexual Activity     Alcohol use: Yes     Drug use: Not on file     Sexual activity: Not on file   Lifestyle     Physical activity     Days per week: Not on file     Minutes per session: Not on file     Stress: Not on file   Relationships     Social connections     Talks on phone: Not on file     Gets together: Not on file     Attends Faith service: Not on file     Active member of club or organization: Not on file     Attends meetings of clubs or organizations: Not on file     Relationship status: Not on file     Intimate partner violence     Fear of current or ex partner: Not on file     Emotionally abused: Not on file     Physically abused: Not on file     Forced sexual activity: Not on file   Other Topics Concern     Not on file   Social History Narrative       Rod    6 sons and multiple grandchildren, also has great grandchildren.       REVIEW OF SYSTEM:  Per HPI    PHYSICAL EXAM:   /72   Pulse 70   Temp 97.2  F (36.2  C)    Resp 18   Wt 172 lb 9.6 oz (78.3 kg)   SpO2 96%   BMI 29.17 kg/m      A limited exam was performed due to recommendations for care during COVID-19 pandemic. Due to the 2020 COVID-19 pandemic, except as noted above, the patient was visually observed at a 6 foot plus distance.  An observational exam was performed in an effort to keep patient safe from COVID-19 and other communicable diseases.     General appearance: alert, appears stated age and cooperative  HEENT: Head is normocephalic with normal hair distribution. No evidence of trauma. Ears: Without lesions or deformity. No acute purulent discharge. Eyes: Conjunctivae pink with no scleral icterus or erythema. Nose: Normal. Oropharnyx: mmm.  Lungs: respirations without effort.  Extremities: extremities normal, atraumatic, no cyanosis.  Skin: Skin color, texture normal. No rashes or lesions on exposed skin.   Neurologic: Grossly normal   Psych: interacts well with caregivers, exhibits logical thought processes and connections, pleasant.      LABS:   None today.     ASSESSMENT:      ICD-10-CM    1. Anticoagulation management encounter  Z51.81     Z79.01    2. History of pulmonary embolus (PE)  Z86.711        PLAN:    Anticoagulation Management  -INR at goal. Current INR is 4.1.  -Hold INR and recheck on Friday.   -Follow up as needed or per routine--report any bleeding to provider team immediately.     Otherwise continue current care plan for all other chronic medical conditions, as they are stable. Encouraged patient to engage in healthy lifestyle behaviors such as engaging in social activities, exercising (PT/OT), eating well, and following care plan. Follow up for routine check-up, or sooner if needed. Will continue to monitor patient and work with nursing staff collaboratively to work toward positive patient outcomes.    Electronically signed by: Lyubov Hall CNP

## 2021-06-12 NOTE — PROGRESS NOTES
"[facility]    Facility:   [facility] [[id number]]   Code Status: FULL CODE and POLST AVAILABLE      CHIEF COMPLAINT/REASON FOR VISIT:  Chief Complaint   Patient presents with     Review Of Multiple Medical Conditions     TCU intake-tachybradycardia syndrome, fall, pelvis fracture, advanced care planning       HISTORY:      HPI: Ginna is a 86 y.o. female who  has a past medical history of Atrial fibrillation with RVR (H), CKD (chronic kidney disease) stage 3, GFR 30-59 ml/min, Disorder of ligament of foot (11/28/2016), Essential hypertension (11/28/2016), Family history of colon cancer, Glaucoma, History of pulmonary embolus (PE) (11/28/2016), LBBB (left bundle branch block), Osteoarthritis of multiple joints (11/28/2016), Osteopenia, Peripheral edema (01/15/2018), Polyarthritis rheumatica (H), Tachy-kayleen syndrome (H), and Vitamin D deficiency.  1 Cristal was recently admitted to [facility] from September 20, 2020 and was discharged on September 29, 2020.  She was admitted for tachy-kayleen syndrome and a pacemaker was placed.  The discharging provider summarized the hospitalization as follows:     \"Ginna Quiroga is a 85-year-old female with pertinent past medical history of hypertension, factor V Leiden with history of PE (on indefinite warfarin), and CKD stage III who was admitted on 9/20/2020 after a fall, concerning for syncope/near syncope, suffered a pelvic fracture, found to have new onset atrial fibrillation with RVR (wide complex tachycardia due to LBBB) with underlying sinus bradycardia. Underwent a dual chamber PPM on 9/25/2020, rates still difficult to control, now s/p AV anibal ablation on 9/28/2020. Patient doing well. Medications adjusted per cardiology. She is discharged to TCU 9/29/2020 for ongoing rehab.\"    Today when he is being evaluated for an intake into the TCU. Ginna shares she is somewhat out of sorts. It has been a whirlwind of admission with the " fall, pelvic fracture and pacemaker insertion. Ginna states she has been doing well considering all of this. She does admit to having loose stools, stating she has had 3-4 per day for the last day or so. She also reports that pain is not well controlled. She was using oxycodone in the hospital but that order did not follow her. Ginna's INR is 4.9 today. She denies any bleeding issues or bruising. Therapies have not yet started, but she is looking forward to getting stronger and going home, where she lives with her son. Ginna does not have any acute concerns or issues to report. Ginna denies any other concerns including fevers/chills, cough or cold symptoms, headaches, vision changes, chest pain/pressure, difficulty breathing, SOB, abdominal pain, nausea, vomiting, diarrhea, dysuria, increasing weakness, increasing pain.     Advanced Care Planning  Spoke with Ginna regarding code status and advanced care planning. Ginna consented to discussion and is aware of possible copay. They are also aware of the necessity of this discussion due to TCU admission. Discussed that she would like to have full resuscitation efforts. she would also like to have treatment if she  were to fall ill. she would like full medical treatment for all medical issues. Her sons Clem and Luke would decide for her if she  was unable to make medical decisions. she agrees to IV/IM antibiotics. she is ok with a feeding tube. There are no Confucianist obligations she  would like documented at this time. She does agree to organ donation.     Past Medical History:   Diagnosis Date     Atrial fibrillation with RVR (H)      CKD (chronic kidney disease) stage 3, GFR 30-59 ml/min      Disorder of ligament of foot 11/28/2016     Essential hypertension 11/28/2016     Family history of colon cancer     Declines having colonoscopy     Glaucoma      History of pulmonary embolus (PE) 11/28/2016    Factor V Leiden mutation, family history pulmonary  embolus     LBBB (left bundle branch block)      Osteoarthritis of multiple joints 2016     Osteopenia     DXA 2018 T score -1.3, previous DXA normal      Peripheral edema 01/15/2018     Polyarthritis rheumatica (H)      Tachy-kayleen syndrome (H)      Vitamin D deficiency              Family History   Problem Relation Age of Onset     Pulmonary embolism Mother          at age 51     Heart attack Father          in his 70s     Colon cancer Brother              Kidney failure Sister      Prostate cancer Son      Hodgkin's lymphoma Son      Social History     Socioeconomic History     Marital status:      Spouse name: Not on file     Number of children: Not on file     Years of education: Not on file     Highest education level: Not on file   Occupational History     Not on file   Social Needs     Financial resource strain: Not on file     Food insecurity     Worry: Not on file     Inability: Not on file     Transportation needs     Medical: Not on file     Non-medical: Not on file   Tobacco Use     Smoking status: Never Smoker     Smokeless tobacco: Never Used   Substance and Sexual Activity     Alcohol use: Yes     Drug use: Not on file     Sexual activity: Not on file   Lifestyle     Physical activity     Days per week: Not on file     Minutes per session: Not on file     Stress: Not on file   Relationships     Social connections     Talks on phone: Not on file     Gets together: Not on file     Attends Latter day service: Not on file     Active member of club or organization: Not on file     Attends meetings of clubs or organizations: Not on file     Relationship status: Not on file     Intimate partner violence     Fear of current or ex partner: Not on file     Emotionally abused: Not on file     Physically abused: Not on file     Forced sexual activity: Not on file   Other Topics Concern     Not on file   Social History Narrative       Rod    6 sons and  multiple grandchildren, also has great grandchildren.       REVIEW OF SYSTEM:  Per HPI    PHYSICAL EXAM:   /61   Pulse 73   Temp 98.7  F (37.1  C)   Resp 18   Wt 142 lb 3.2 oz (64.5 kg)   SpO2 96%   BMI 24.03 kg/m      A limited exam was performed due to recommendations for care during COVID-19 pandemic. Due to the 2020 COVID-19 pandemic, except as noted above, the patient was visually observed at a 6 foot plus distance.  An observational exam was performed in an effort to keep patient safe from COVID-19 and other communicable diseases.     General appearance: alert, appears stated age and cooperative  HEENT: Head is normocephalic with normal hair distribution. No evidence of trauma. Ears: Without lesions or deformity. No acute purulent discharge. Eyes: Conjunctivae pink with no scleral icterus or erythema. Nose: Normal. Oropharnyx: mmm.  Lungs: respirations without effort.  Extremities: extremities normal, atraumatic, no cyanosis.  Skin: Skin color, texture normal. No rashes or lesions on exposed skin.   Neurologic: Grossly normal   Psych: interacts well with caregivers, exhibits logical thought processes and connections, pleasant.      LABS:   None today.     ASSESSMENT:      ICD-10-CM    1. Physical deconditioning  R53.81    2. Tachy-kayleen syndrome (H)  I49.5    3. Status post biventricular cardiac pacemaker insertion  Z95.0    4. Multiple closed fractures of pelvis without disruption of pelvic ring with routine healing  S32.82XD    5. Advance care planning  Z71.89    6. Functional diarrhea  K59.1    7. Anticoagulation management encounter  Z51.81     Z79.01        PLAN:    Physical Deconditioning  -Continue PT/OT and other therapies as per care plan.  -Encouraged good nutrition and movement habits.   -Discussed care plan and expected course of stay.   -Continue to follow-up per routine schedule or sooner if needed.     Tachy kayleen Syndrome, s/p Pacemaker Insertions  -To follow with cardiology.    -Metoprolol 100 mg by mouth daily.   -Vasotec 10 mg by mouth two times a day.     Pelvis Fractures  -Tylenol 325 mg by mouth every 4 hours as needed.   -Oxycodone 2.5 mg by mouth every 6 hours as needed.     Diarrhea  -Imodium 2 mg by mouth three times a day as needed.     Anticoagulation Management  -Hold coumadin.   -INR on Friday.     Advanced Care Planning  -POLST reviewed and signed.   -Full Code.     Admission history and physical per MD in the next 30 days. At this time continue current care plan for all chronic medical conditions, as they are stable. Encouraged patient to engage in PT/OT for strengthening and conditioning. Encouraged patient to work closely with nursing staff to ensure any medical complaints are quickly addressed. Follow up this week or sooner if needed. Will continue to monitor patient and work with nursing staff collaboratively to work toward positive patient outcomes.    Total unit/floor time of 40 minutes time consisted of the following: time spent with patient, examination of patient, reviewing the record including pertinent labs and completing documentation. More than 50% of this time was spent in coordination of care time with nursing staff and other healthcare providers, this time was spent on discussion/counseling on current care plan including medical management of chronic health problems and acute health problems, education pertaining to plan, and discussion of the goals of care pertaining to the current outlined plan with nursing staff and patient. An additional 16 minutes of time was spent discussing code status, wishes for end of life care and reviewing POLST from 1005 to 1021. POLST signed and left with nursing staff.     Electronically signed by: Lyubov Hall CNP

## 2021-06-13 NOTE — TELEPHONE ENCOUNTER
ANTICOAGULATION  MANAGEMENT    Assessment     Today's INR result of 2.3 is Therapeutic (goal INR of 2.0-3.0)        Warfarin taken as previously instructed    No new diet changes affecting INR    No new medication/supplements affecting INR    Continues to tolerate warfarin with no reported s/s of bleeding or thromboembolism     Previous INR was Therapeutic    Plan:     Spoke on phone with Ginna regarding INR result and instructed:     Warfarin Dosing Instructions:  Continue current warfarin dose    1 mg, then 2 mg repeating every 2 days        (0 % change)    Instructed patient to follow up no later than: 4 weeks - prefers to call to make appointment.    Education provided: importance of therapeutic range    Ginna verbalizes understanding and agrees to warfarin dosing plan.    Instructed to call the [facility] for any changes, questions or concerns. (#[phone])   ?   Ольга Forbes RN    Subjective/Objective:      Ginna Coleann, a 86 y.o. female is on warfarin.     Ginna reports:     Home warfarin dose: as updated on anticoagulation calendar per template     Missed doses: No     Medication changes:  No     S/S of bleeding or thromboembolism:  No     New Injury or illness:  No     Changes in diet or alcohol consumption:  No     Upcoming surgery, procedure or cardioversion:  No    Anticoagulation Episode Summary     Current INR goal:  2.0-3.0   TTR:  89.6 % (1 y)   Next INR check:  12/21/2020   INR from last check:  2.30 (11/23/2020)   Weekly max warfarin dose:     Target end date:     INR check location:     Preferred lab:     Send INR reminders to:  [facility]    Indications    Long term current use of anticoagulant therapy [Z79.01]  DVT (deep venous thrombosis) (H) (Resolved) [I82.409]           Comments:           Anticoagulation Care Providers     Provider Role Specialty Phone number    Dawson Cloud MD Referring Internal Medicine [phone]

## 2021-06-14 NOTE — TELEPHONE ENCOUNTER
ANTICOAGULATION  MANAGEMENT    Assessment     Today's INR result of 2.7 is Therapeutic (goal INR of 2.0-3.0)        Warfarin taken as previously instructed    No new diet changes affecting INR    No new medication/supplements affecting INR    Continues to tolerate warfarin with no reported s/s of bleeding or thromboembolism     Previous INR was Supratherapeutic    Plan:     Spoke on phone with Ginna regarding INR result and instructed:     Warfarin Dosing Instructions:  Continue current warfarin dose    1 mg, then 2 mg repeating every 2 days      (0 % change)    Instructed patient to follow up no later than:2-3 weeks- prefers to call to make appointment.    Education provided: importance of therapeutic range and target INR goal and significance of current INR result    Ginna verbalizes understanding and agrees to warfarin dosing plan.    Instructed to call the [facility] for any changes, questions or concerns. (#[phone])   ?   Ольга Forbes RN    Subjective/Objective:      Ginna Quiroga, a 86 y.o. female is on warfarin.     Ginna reports:     Home warfarin dose: verbally confirmed home dose with Ginna and updated on anticoagulation calendar     Missed doses: No     Medication changes:  No     S/S of bleeding or thromboembolism:  No     New Injury or illness:  No     Changes in diet or alcohol consumption:  No     Upcoming surgery, procedure or cardioversion:  No    Anticoagulation Episode Summary     Current INR goal:  2.0-3.0   TTR:  91.0 % (1 y)   Next INR check:  2/3/2021   INR from last check:  2.70 (1/13/2021)   Weekly max warfarin dose:     Target end date:     INR check location:     Preferred lab:     Send INR reminders to:  [facility]    Indications    Long term current use of anticoagulant therapy [Z79.01]  DVT (deep venous thrombosis) (H) (Resolved) [I82.409]           Comments:           Anticoagulation Care Providers     Provider Role Specialty Phone number     Dawson Cloud MD [facility] Internal Medicine [phone]

## 2021-06-14 NOTE — TELEPHONE ENCOUNTER
Who is calling:  Ari Wu phone   Reason for Call:  Cancelled INR appointment for today, due to COVID 19 exposure on 12/23/20.   Bryce is questioning next steps for INR and dosing?    COVID 19 exposure guidelines reviewed. Transferred to Central Scheduling for Virtual Visit to advise on COVID 19 lab.    COVID 19 Nurse Triage Plan/Patient Instructions    Please be aware that novel coronavirus (COVID-19) may be circulating in the community. If you develop symptoms such as fever, cough, or SOB or if you have concerns about the presence of another infection including coronavirus (COVID-19), please contact your health care provider or visit [email or web address].     Disposition/Instructions    Virtual Visit with provider recommended. Reference Visit Selection Guide.    Thank you for taking steps to prevent the spread of this virus.  o Limit your contact with others.  o Wear a simple mask to cover your cough.  o Wash your hands well and often.    Resources    [facility]: About COVID-19: [email or web address]    CDC: What to Do If You're Sick: www.cdc.gov/coronavirus/2019-ncov/about/steps-when-sick.html    CDC: Ending Home Isolation: www.cdc.gov/coronavirus/2019-ncov/hcp/disposition-in-home-patients.html     CDC: Caring for Someone: www.cdc.gov/coronavirus/2019-ncov/if-you-are-sick/care-for-someone.html     [facility]: Interim Guidance for Hospital Discharge to Home: www.health.[facility].[place]/diseases/coronavirus/hcp/hospdischarge.pdf    [facility] clinical trials (COVID-19 research studies): clinicalaffairs.[facility]/n-clinical-trials     Below are the COVID-19 hotlines at the [place] Department of Health ([facility]). Interpreters are available.   o For health questions: Call 455-635-6175 or 1-416.728.9014 (7 a.m. to 7 p.m.)  o For questions about schools and childcare: Call 242-088-4123 or 1-879.423.7206 (7 a.m. to 7 p.m.)       Reason for Disposition    [1] CLOSE CONTACT COVID-19 EXPOSURE  within last 14 days AND [2] NO symptoms    Additional Information    Negative: COVID-19 lab test positive    Negative: [1] Lives with someone known to have influenza (flu test positive) AND [2] flu-like symptoms (e.g., cough, runny nose, sore throat, SOB; with or without fever)    Negative: [1] Symptoms of COVID-19 (e.g., cough, fever, SOB, or others) AND [2] HCP diagnosed COVID-19 based on symptoms    Negative: [1] Symptoms of COVID-19 (e.g., cough, fever, SOB, or others) AND [2] lives in an area with community spread    Negative: [1] Symptoms of COVID-19 (e.g., cough, fever, SOB, or others) AND [2] within 14 days of EXPOSURE (close contact) with diagnosed or suspected COVID-19 patient    Negative: [1] Symptoms of COVID-19 (e.g., cough, fever, SOB, or others) AND [2] within 14 days of travel from high-risk area for COVID-19 community spread (identified by CDC)    Negative: [1] Difficulty breathing (shortness of breath) occurs AND [2] onset > 14 days after COVID-19 EXPOSURE (Close Contact)    Negative: [1] Dry cough occurs AND [2] onset > 14 days after COVID-19 EXPOSURE    Negative: [1] Wet cough (i.e., white-yellow, yellow, green, or juan david colored sputum) AND [2] onset > 14 days after COVID-19 EXPOSURE    Negative: [1] Common cold symptoms AND [2] onset > 14 days after COVID-19 EXPOSURE    Negative: [1] CLOSE CONTACT COVID-19 EXPOSURE within last 14 days AND [2] needs COVID-19 lab test to return to work AND [3] NO symptoms    Negative: [1] CLOSE CONTACT COVID-19 EXPOSURE within last 14 days AND [2] exposed person is a  (e.g., police or paramedic) AND [3] NO symptoms    Negative: [1] CLOSE CONTACT COVID-19 EXPOSURE within last 14 days AND [2] exposed person is a healthcare worker who was NOT using all recommended personal protective equipment (i.e., a respirator-N95 mask, eye protection, gloves, and gown) AND [3] NO symptoms    Negative: [1] Living or working in a correctional facility, long-term care  facility, or shelter (i.e., congregate setting; densely populated) AND [2] where an outbreak has occurred AND [3] NO symptoms    Protocols used: CORONAVIRUS (COVID-19) EXPOSURE-A-AH 12.1.20

## 2021-06-14 NOTE — TELEPHONE ENCOUNTER
Warfarin refilled per [facility] protocol.   Last OV on 11/9. Referral UTD, last renewed on 12/15. INR checked within past 90 days, most recently on 1/13.    Ava Boggs RN  Anticoagulation Clinic

## 2021-06-14 NOTE — TELEPHONE ENCOUNTER
Okay to get scheduled for COVID-19 test.  Patient should check her INR early next week once the COVID-19 test is negative.

## 2021-06-15 NOTE — PROGRESS NOTES
Anticoagulation pool updated to [facility] in alignment with PCP clinic relocation.

## 2021-06-15 NOTE — TELEPHONE ENCOUNTER
ANTICOAGULATION  MANAGEMENT    Assessment     Today's INR result of 2.1 is Therapeutic (goal INR of 2.0-3.0)        Warfarin taken as previously instructed    No new diet changes affecting INR    No new medication/supplements affecting INR    Continues to tolerate warfarin with no reported s/s of bleeding or thromboembolism     Previous INR was Supratherapeutic    Plan:     Message left for Ginna regarding INR result and instructed:      Warfarin Dosing Instructions:  Continue current warfarin dose 2 mg daily on Mondays, Wednesdays and Fridays; and 4 mg daily rest of week  (0 % change)    Instructed patient to follow up no later than: two weeks    Education provided:    Instructed to call the [facility] for any changes, questions or concerns. (#[phone])   ?   Gretta Maxwell RN    Subjective/Objective:      Ginna Quiroga, a 86 y.o. female is on warfarin. Ginna Chacko reports:     Home warfarin dose: as updated on anticoagulation calendar per template     Missed doses: No     Medication changes:  No     S/S of bleeding or thromboembolism:  No     New Injury or illness:  No     Changes in diet or alcohol consumption:  No     Upcoming surgery, procedure or cardioversion:  No    Anticoagulation Episode Summary     Current INR goal:  2.0-3.0   TTR:  89.1 % (1 y)   Next INR check:  3/4/2021   INR from last check:  2.10 (2/18/2021)   Weekly max warfarin dose:     Target end date:     INR check location:     Preferred lab:     Send INR reminders to:  [facility]    Indications    Long term current use of anticoagulant therapy [Z79.01]  DVT (deep venous thrombosis) (H) (Resolved) [I82.409]           Comments:           Anticoagulation Care Providers     Provider Role Specialty Phone number    Dawson Cloud MD Referring Internal Medicine [phone]

## 2021-06-15 NOTE — PROGRESS NOTES
Assessment and Plan:       1. Medicare annual wellness visit, initial  Immunizations are reviewed and everything is up-to-date.  Living will discussed.  Non-smoker.  Uses alcohol in moderation.  Regular exercise discussed.  She declines having a colonoscopy.  Breast exam completed and I am recommending annual mammogram.  Pelvic exam completed.  Last DEXA was 2009 and this should be repeated this year. Dementia and depression screening completed.  She sees an ophthalmologist regularly and gets glaucoma screening.  Skin exam performed and recommending regular use of sunblock.    Will screen for diabetes with fasting glucose.  Checking fasting lipid profile.    2. Essential hypertension  Blood pressure slightly above goal.  We discussed sodium restriction and getting more regular exercise.  Already takes metoprolol, Vasotec, hydrochlorothiazide, and verapamil  - Comprehensive Metabolic Panel  - Lipid Cascade  - Urinalysis    3. History of pulmonary embolus (PE)  Continues on lifelong anticoagulation with warfarin.  Recheck INR and adjust dose    4. Osteoarthritis of multiple joints  Knee pain doing relatively well since cortisone injection    5. Glaucoma  Sees ophthalmology regularly    6. Peripheral edema  Increasing edema over the past 6 months.  No dyspnea.  Does not watch her sodium intake carefully.  Also takes calcium channel blocker.  Information provided to reduce sodium under 2000 mg daily.  Nothing in history or exam to suggest CHF.  Will check renal function.    7. Medication monitoring encounter  Monitor hemoglobin and screen for anemia while anticoagulated with warfarin  - Hemoglobin    8. Postmenopausal    - DXA Bone Density Scan; Future    9. Encounter for screening mammogram for malignant neoplasm of breast    - Mammo Screening Bilateral; Future        The patient's current medical problems were reviewed.    Over 25 minutes was spent addressing these chronic and new medical problems beyond time spent  performing annual wellness visit with over 50% of the time spent counseling and coordination of care    I have had an Advance Directives discussion with the patient.  The following health maintenance schedule was reviewed with the patient and provided in printed form in the after visit summary:   Health Maintenance   Topic Date Due     DXA SCAN  10/05/1999     FALL RISK ASSESSMENT  01/15/2019     TD 18+ HE  01/01/2023     ADVANCE DIRECTIVES DISCUSSED WITH PATIENT  01/15/2023     PNEUMOCOCCAL POLYSACCHARIDE VACCINE AGE 65 AND OVER  Completed     INFLUENZA VACCINE RULE BASED  Completed     PNEUMOCOCCAL CONJUGATE VACCINE FOR ADULTS (PCV13 OR PREVNAR)  Completed     ZOSTER VACCINE  Completed        Subjective:   Chief Complaint: Ginna Quiroga is an 83 y.o. female here for an Annual Wellness visit.   HPI: In addition to wellness visit, multiple chronic medical problems and new concerns were addressed today.  She has a history of pulmonary embolus, unprovoked and remains on warfarin indefinitely.  She gets her INR checked regularly.  She has had no bleeding complications.  Specifically no nosebleeds, no melena or hematochezia.  She has seen some increased swelling in her ankles and feet.  She does not watch her sodium intake very carefully.  She enjoys her pretzels and potato chips.  Denies any dyspnea, orthopnea or PND.  No chest pain or palpitations.  She does not use NSAIDs.  Same dose of verapamil.  Arthritis symptoms are manageable.  She had a cortisone injection into her knee and this was helpful.  She is seeing her eye doctor regularly for glaucoma and continues on drops.  She is on several medications to help manage hypertension.    Review of Systems:    Please see above.  The rest of the review of systems are negative for all systems.    Patient Care Team:  Dawson Cloud MD as PCP - General (Internal Medicine)     Patient Active Problem List   Diagnosis     Long term current use of anticoagulant  therapy     Essential hypertension     Osteoarthritis of multiple joints     Glaucoma     History of pulmonary embolus (PE)     Family history of colon cancer     Peripheral edema     Past Medical History:   Diagnosis Date     Disorder of ligament of foot 2016     Essential hypertension 2016     Family history of colon cancer     Declines having colonoscopy     Glaucoma      History of pulmonary embolus (PE) 2016    Factor V Leiden mutation, family history pulmonary embolus     Osteoarthritis of multiple joints 2016     Peripheral edema 1/15/2018     Polyarthritis rheumatica      Screening     DEXA scan normal       Past Surgical History:   Procedure Laterality Date     IN DISCISSION,2ND CATARACT,LASER Left 2/3/2016    Procedure: LASER YAG CAPSULOTOMY, LEFT;  Surgeon: Jitendra Rick MD;  Location: [facility];  Service: Ophthalmology     IN DISCISSION,2ND CATARACT,LASER Right 2016    Procedure: LASER YAG CAPSULOTOMY, RIGHT;  Surgeon: Jitendra Rick MD;  Location: [facility];  Service: Ophthalmology      Family History   Problem Relation Age of Onset     Pulmonary embolism Mother       at age 51     Heart attack Father       in his 70s     Colon cancer Brother           Kidney failure Sister      Prostate cancer Son      Hodgkin's lymphoma Son       Social History     Social History     Marital status:      Spouse name: N/A     Number of children: N/A     Years of education: N/A     Occupational History     Not on file.     Social History Main Topics     Smoking status: Never Smoker     Smokeless tobacco: Never Used     Alcohol use Yes     Drug use: Not on file     Sexual activity: Not on file     Other Topics Concern     Not on file     Social History Narrative       Rod    6 sons and multiple grandchildren, also has great grandchildren.      Current Outpatient Prescriptions   Medication Sig Dispense Refill     enalapril (VASOTEC) 10 MG  "tablet TAKE ONE TABLET BY MOUTH ONCE DAILY 90 tablet 1     hydroCHLOROthiazide (HYDRODIURIL) 50 MG tablet TAKE ONE TABLET BY MOUTH ONCE DAILY 90 tablet 1     latanoprost (XALATAN) 0.005 % ophthalmic solution        metoprolol succinate (TOPROL XL) 100 MG 24 hr tablet Take 1 tablet (100 mg total) by mouth daily. 90 tablet 1     potassium chloride SA (K-DUR,KLOR-CON) 20 MEQ tablet Take 1 tablet (20 mEq total) by mouth 2 (two) times a day. Must have kept 1/15 appointment for further refills. 180 tablet 0     verapamil (CALAN-SR) 240 MG CR tablet Take 1 tablet (240 mg total) by mouth daily. Follow-up appointment required for future refills 90 tablet 0     warfarin (COUMADIN) 2 MG tablet Take 1 tab by mouth daily. 90 tablet 3     No current facility-administered medications for this visit.       Objective:   Vital Signs:   Visit Vitals     /78 (Patient Site: Left Arm, Patient Position: Sitting, Cuff Size: Adult Regular)     Pulse (!) 51     Ht 5' 4.5\" (1.638 m)     Wt 178 lb (80.7 kg)     SpO2 98%     BMI 30.08 kg/m2            PHYSICAL EXAM  EYES: Eyelids, conjunctiva, and sclera were normal. Pupils were normal. Cornea, iris, and lens were normal bilaterally.  HEAD, EARS, NOSE, MOUTH, AND THROAT: Head and face were normal. Nose appearance was normal and there was no discharge. Oropharynx was normal.  NECK: Neck appearance was normal. There were no neck masses and the thyroid was not enlarged and no nodules are felt.  No lymphadenopathy.  RESPIRATORY: Breathing pattern was normal and the chest moved symmetrically.  Percussion/auscultatory percussion was normal.  Lung sounds were normal and there were no rales or wheezes.  CARDIOVASCULAR: Heart rate and rhythm were normal.  S1 and S2 were normal and there were no extra sounds or murmurs. Peripheral pulses in arms and legs were normal.  Jugular venous pressure was normal.  There was no peripheral edema.  No carotid bruits.  BREAST: No palpable masses or tenderness.  " No axillary nodes.  GASTROINTESTINAL: The abdomen was normal in contour.  Bowel sounds were present.   Palpation detected no tenderness, mass, or enlarged organs.   PELVIC: No external lesions.   Uterus was normal size, shape, and consistency, without palpable lesions.  Adnexa were nontender without palpable mass.  MUSCULOSKELETAL: Skeletal configuration was normal and muscle mass was normal for age. Joint appearance was overall normal.  LYMPHATIC: There were no enlarged nodes.  SKIN/HAIR/NAILS: Skin color was normal.  Hair and nails were normal.There were no skin lesions.  NEUROLOGIC: The patient was alert and oriented to person, place, time, and circumstance. Speech was normal. Cranial nerves were normal. Motor strength was normal for age. The patient was normally coordinated.  Sensation intact.  PSYCHIATRIC:  Mood and affect were normal and the patient had normal recent and remote memory. The patient's judgment and insight were normal.    Assessment Results 1/15/2018   Activities of Daily Living No help needed   Instrumental Activities of Daily Living No help needed   Get Up and Go Score Less than 12 seconds   Mini Cog Total Score 4   Some recent data might be hidden     A Mini-Cog score of 0-2 suggests the possibility of dementia, score of 3-5 suggests no dementia    Identified Health Risks:     She is at risk for lack of exercise and has been provided with information to increase physical activity for the benefit of her well-being.  Information regarding advance directives (living morrell), including where she can download the appropriate form, was provided to the patient via the AVS.

## 2021-06-16 PROBLEM — L98.9 FACIAL SKIN LESION: Status: ACTIVE | Noted: 2021-03-18

## 2021-06-16 PROBLEM — R53.81 PHYSICAL DECONDITIONING: Status: ACTIVE | Noted: 2020-10-06

## 2021-06-16 PROBLEM — K59.1 FUNCTIONAL DIARRHEA: Status: ACTIVE | Noted: 2020-10-06

## 2021-06-16 PROBLEM — Z51.81 ANTICOAGULATION MANAGEMENT ENCOUNTER: Status: ACTIVE | Noted: 2020-10-06

## 2021-06-16 PROBLEM — M79.672 LEFT FOOT PAIN: Status: ACTIVE | Noted: 2019-01-29

## 2021-06-16 PROBLEM — M80.00XD AGE-RELATED OSTEOPOROSIS WITH CURRENT PATHOLOGICAL FRACTURE WITH ROUTINE HEALING: Status: ACTIVE | Noted: 2020-11-09

## 2021-06-16 PROBLEM — Z79.01 ANTICOAGULATION MANAGEMENT ENCOUNTER: Status: ACTIVE | Noted: 2020-10-06

## 2021-06-16 PROBLEM — R60.0 PERIPHERAL EDEMA: Status: ACTIVE | Noted: 2018-01-15

## 2021-06-16 PROBLEM — E83.42 HYPOMAGNESEMIA: Status: ACTIVE | Noted: 2020-11-11

## 2021-06-16 PROBLEM — I48.0 PAROXYSMAL ATRIAL FIBRILLATION (H): Status: ACTIVE | Noted: 2020-11-09

## 2021-06-16 NOTE — TELEPHONE ENCOUNTER
ANTICOAGULATION  MANAGEMENT    Assessment     Today's INR result of 2.3 is Therapeutic (goal INR of 2.0-3.0)        Warfarin taken as previously instructed    No new diet changes affecting INR    No new medication/supplements affecting INR    Continues to tolerate warfarin with no reported s/s of bleeding or thromboembolism     Previous INR was Subtherapeutic    Plan:     Spoke on phone with Ginna regarding INR result and instructed:      Warfarin Dosing Instructions:  Continue current warfarin dose 1 mg daily on Mondays, Wednesdays and Fridays; and 2 mg daily rest of week  (0 % change)    Instructed patient to follow up no later than: 2-3 weeks.    Education provided: importance of therapeutic range and target INR goal and significance of current INR result    Ginna verbalizes understanding and agrees to warfarin dosing plan.    Instructed to call the [facility] for any changes, questions or concerns. (#[phone])   ?   Gretta Maxwell RN    Subjective/Objective:      Ginna Quiroga, a 86 y.o. female is on warfarin. Ginna Chacko reports:     Home warfarin dose: verbally confirmed home dose with Ginna and updated on anticoagulation calendar     Missed doses: No     Medication changes:  No     S/S of bleeding or thromboembolism:  No     New Injury or illness:  No     Changes in diet or alcohol consumption:  No     Upcoming surgery, procedure or cardioversion:  No    Anticoagulation Episode Summary     Current INR goal:  2.0-3.0   TTR:  80.9 % (1 y)   Next INR check:  4/26/2021   INR from last check:  2.30 (4/5/2021)   Weekly max warfarin dose:     Target end date:     INR check location:     Preferred lab:     Send INR reminders to:  [facility]    Indications    Long term current use of anticoagulant therapy [Z79.01]  DVT (deep venous thrombosis) (H) (Resolved) [I82.409]           Comments:           Anticoagulation Care Providers     Provider Role Specialty Phone number     Dawson Cloud MD [facility] Internal Medicine [phone]

## 2021-06-16 NOTE — TELEPHONE ENCOUNTER
Telephone Encounter by Ольга Forbes RN at 3/11/2020 12:28 PM     Author: Ольга Forbes RN Service: -- Author Type: Registered Nurse    Filed: 3/11/2020 12:41 PM Encounter Date: 3/11/2020 Status: Attested    : Ольга Forbes RN (Registered Nurse) Cosigner: Dawson Cloud MD at 3/12/2020  8:04 AM    Attestation signed by Dawson Cloud MD at 3/12/2020  8:04 AM    agree                ANTICOAGULATION  MANAGEMENT    Assessment     Today's INR result of 2.9 is Therapeutic (goal INR of 2.0-3.0)        Warfarin taken as previously instructed    No new diet changes affecting INR    No new medication/supplements affecting INR    Continues to tolerate warfarin with no reported s/s of bleeding or thromboembolism     Previous INR was Therapeutic    Plan:     Spoke with Ginna regarding INR result and instructed:     Warfarin Dosing Instructions:  Continue current warfarin dose    1 mg every Wed, Sat; 2 mg all other days     (0 % change)    Instructed patient to follow up no later than: 6 weeks - patient prefers to call for appointment.    Education provided: importance of therapeutic range and importance of notifying clinic for changes in medications    Ginna verbalizes understanding and agrees to warfarin dosing plan.    Instructed to call the [facility] for any changes, questions or concerns. (#[phone])   ?   Ольга Forbes RN    Subjective/Objective:      Ginna Quiroga, a 85 y.o. female is on warfarin.     Ginna reports:     Home warfarin dose: as updated on anticoagulation calendar per template     Missed doses: No     Medication changes:  No     S/S of bleeding or thromboembolism:  No     New Injury or illness:  No     Changes in diet or alcohol consumption:  No     Upcoming surgery, procedure or cardioversion:  No    Anticoagulation Episode Summary     Current INR goal:   2.0-3.0   TTR:   85.1 % (1 y)   Next INR check:   4/22/2020   INR from last check:   2.90  (3/11/2020)   Weekly max warfarin dose:      Target end date:      INR check location:      Preferred lab:      Send INR reminders to:   [facility]    Indications    Long term current use of anticoagulant therapy [Z79.01]  DVT (deep venous thrombosis) (H) (Resolved) [I82.409]           Comments:            Anticoagulation Care Providers     Provider Role Specialty Phone number    Dawson Cloud MD Referring Internal Medicine [phone]

## 2021-06-16 NOTE — PROGRESS NOTES
Assessment & Plan     Paroxysmal atrial fibrillation (H)  Hospitalized with syncope and found to have new diagnosis of atrial fibrillation.  Ablation completed with placement of permanent pacemaker for tachybradycardia syndrome.  Remains anticoagulated with warfarin.  INR to be rechecked today and dose adjusted.  Continues on metoprolol with good rate control.    Tachy-kayleen syndrome (H)  As above, tachybradycardia syndrome with atrial fibrillation and uncontrolled ventricular rate with ablation.  Interrogation of pacemaker showing atrial fibrillation with complete heart block.  V paced dependent.  Will follow up with cardiology in November and will continue interrogation of her device every 3 months.    Age-related osteoporosis with current pathological fracture with routine healing  Pelvic fracture with a left superior and inferior pubis ramus fracture associated with fall.  DEXA looked reasonably good but still concerns for osteoporosis and now taking Fosamax 70 mg every week with plans to continue for a total of 5 years.  He should maintain good calcium and vitamin D intake.  Vitamin D level looking adequate.  She continues replacement.    Essential hypertension  Excellent blood pressure control taking enalapril and metoprolol    Stage 3a chronic kidney disease  Monitor renal function while on diuretic  - Basic Metabolic Panel    Hypomagnesemia  Now on magnesium replacement.  Recheck level  - Magnesium    Peripheral edema  Edema fairly well controlled with Lasix 40 mg daily but we also discussed using elastic compression stockings and continue leg elevation and sodium restriction.    Facial skin lesion  New lesion on the left side of her face concerning for possible malignancy such as basal cell skin cancer.  I am asking her to make an appointment with dermatology ASAP.    Long term current use of anticoagulant therapy  Continue to monitor INR and adjust dose of warfarin    Encounter for therapeutic drug  "monitoring  Monitor CBC while anticoagulated  - HM2(CBC w/o Differential)               BMI:   Estimated body mass index is 28.56 kg/m  as calculated from the following:    Height as of this encounter: 5' 4.5\" (1.638 m).    Weight as of this encounter: 169 lb (76.7 kg).       Return in about 4 months (around 7/18/2021) for Annual physical.    Dawson Cloud MD  [facility]    Subjective       HPI 86-year-old woman with hypertension, tachybradycardia syndrome with atrial fibrillation and placement of permanent pacemaker along with osteoporosis and history of pelvic fracture here to follow-up.  See assessment and plan for details of visit    Current Outpatient Medications on File Prior to Visit   Medication Sig Dispense Refill     acetaminophen (TYLENOL) 325 MG tablet Take 325 mg by mouth every 4 (four) hours as needed for pain.       alendronate (FOSAMAX) 70 MG tablet Take 1 tablet (70 mg total) by mouth every 7 days. Take in the morning on an empty stomach with a full glass of water 30 minutes before food 12 tablet 3     cholecalciferol, vitamin D3, 1,000 unit (25 mcg) tablet Take 1,000 Units by mouth daily.       enalapril (VASOTEC) 10 MG tablet TAKE 1 TABLET(10 MG) BY MOUTH TWICE DAILY 180 tablet 3     furosemide (LASIX) 40 MG tablet Take 1 tablet (40 mg total) by mouth daily. 90 tablet 3     latanoprost (XALATAN) 0.005 % ophthalmic solution Administer 1 drop to both eyes at bedtime.        magnesium oxide (MAGOX) 400 mg (241.3 mg magnesium) tablet Take 2 tablets (800 mg total) by mouth daily. 200 tablet 1     metoprolol succinate (TOPROL-XL) 100 MG 24 hr tablet Take 1 tablet (100 mg total) by mouth daily. 90 tablet 3     vit A/vit C/vit E/zinc/copper (PRESERVISION AREDS ORAL) Take 1 tablet by mouth 2 (two) times a day.       warfarin ANTICOAGULANT (COUMADIN/JANTOVEN) 2 MG tablet TAKE ONE-HALF TO ONE TABLET (1-2MG) BY MOUTH ONCE DAILY AS DIRECTED ADJUST PER INR RESULT 90 " "tablet 1     diclofenac sodium (VOLTAREN) 1 % Gel Apply topically 3 (three) times a day.       [DISCONTINUED] warfarin sodium (WARFARIN ORAL) Take by mouth. 10/15/20 INR 3.1  Take 1.5mg daily.  Next INR 10/19/20.       No current facility-administered medications on file prior to visit.         Review of Systems  No chest pain or palpitations.  No change in chronic edema.  Denies dyspnea.  12 point ROS is negative other than what is described in assessment and plan and above      Objective    Vitals:    03/18/21 1406   BP: 138/80   Patient Site: Right Arm   Patient Position: Sitting   Cuff Size: Adult Regular   Pulse: 67   Temp: 97.7  F (36.5  C)   TempSrc: Tympanic   SpO2: 100%   Weight: 169 lb (76.7 kg)   Height: 5' 4.5\" (1.638 m)        Physical Exam  Lungs clear bilaterally without rales or wheezes  Heart regular rate and rhythm.  No murmur  1+ bilateral edema involving feet and ankles  Nonspecific erythematous and raised skin lesion on left side of face concerning for possible malignancy      "

## 2021-06-16 NOTE — PATIENT INSTRUCTIONS - HE
Contact [facility] at [phone] ASAP to have the lesion on your face evaluated.  This is possibly a skin cancer such as basal cell carcinoma.  I would recommend Dr. Lisa Ventura.

## 2021-06-17 NOTE — TELEPHONE ENCOUNTER
Telephone Encounter by Ольга Forbes RN at 1/4/2021  4:22 PM     Author: Ольга Forbes RN Service: -- Author Type: Registered Nurse    Filed: 1/4/2021  4:29 PM Encounter Date: 1/4/2021 Status: Signed    : Ольга Forbes RN (Registered Nurse)       ANTICOAGULATION  MANAGEMENT    Assessment     Today's INR result of 3.2 is Supratherapeutic (goal INR of 2.0-3.0)        Warfarin taken as previously instructed    No new diet changes affecting INR    No new medication/supplements affecting INR    Continues to tolerate warfarin with no reported s/s of bleeding or thromboembolism     Previous INR was Therapeutic    Plan:     Spoke on phone with Ginna regarding INR result and instructed:     Warfarin Dosing Instructions:  Continue current warfarin dose    1 mg, then 2 mg repeating every 2 days      (0 % change)  Per patient she will have a serving of broccoli today to help bring INR down.    Instructed patient to follow up no later than: 1-2 weeks    Education provided: impact of vitamin K foods on INR, vitamin K content of foods, importance of therapeutic range and target INR goal and significance of current INR result    Ginna verbalizes understanding and agrees to warfarin dosing plan.    Instructed to call the [facility] for any changes, questions or concerns. (#[phone])   ?   Ольга Forbes RN    Subjective/Objective:      Ginna Quiroga, a 86 y.o. female is on warfarin.     Ginna reports:     Home warfarin dose: as updated on anticoagulation calendar per template     Missed doses: No     Medication changes:  No     S/S of bleeding or thromboembolism:  No     New Injury or illness:  No     Changes in diet or alcohol consumption:  No     Upcoming surgery, procedure or cardioversion:  No    Anticoagulation Episode Summary     Current INR goal:  2.0-3.0   TTR:  91.9 % (1 y)   Next INR check:  1/18/2021   INR from last check:  3.20 (1/4/2021)   Weekly max warfarin dose:     Target end  date:     INR check location:     Preferred lab:     Send INR reminders to:  [facility] [place]    Indications    Long term current use of anticoagulant therapy [Z79.01]  DVT (deep venous thrombosis) (H) (Resolved) [I82.409]           Comments:           Anticoagulation Care Providers     Provider Role Specialty Phone number    Dawson Cloud MD Referring Internal Medicine [phone]

## 2021-06-17 NOTE — TELEPHONE ENCOUNTER
Telephone Encounter by Gretta Maxwell RN at 3/18/2021  3:42 PM     Author: Gretta Maxwell RN Service: -- Author Type: Registered Nurse    Filed: 3/18/2021  4:12 PM Encounter Date: 3/18/2021 Status: Signed    : Gretta Maxwell RN (Registered Nurse)       ANTICOAGULATION  MANAGEMENT    Assessment     Today's INR result of 1.9 is Subtherapeutic (goal INR of 2.0-3.0)        Warfarin taken as previously instructed    No new diet changes affecting INR    No new medication/supplements affecting INR    Continues to tolerate warfarin with no reported s/s of bleeding or thromboembolism     Previous INR was Subtherapeutic    Plan:     Spoke on phone with Ginna regarding INR result and instructed:      Warfarin Dosing Instructions:  Continue current warfarin dose 1 mg daily on Mondays, Wednesdays and Fridays; and 2 mg daily rest of week  (0 % change) She preferred not to take a boost dose or change dose at this time.    Instructed patient to follow up no later than: two weeks.    Education provided: importance of therapeutic range, importance of following up for INR monitoring at instructed interval and importance of taking warfarin as instructed    Ginna verbalizes understanding and agrees to warfarin dosing plan.    Instructed to call the [facility] for any changes, questions or concerns. (#[phone])   ?   Gretta Maxwell RN    Subjective/Objective:      Ginna Quiroga, a 86 y.o. female is on warfarin. Ginna Chacko reports:     Home warfarin dose: verbally confirmed home dose with Ginna and updated on anticoagulation calendar     Missed doses: No     Medication changes:  No     S/S of bleeding or thromboembolism:  No     New Injury or illness:  No     Changes in diet or alcohol consumption:  No     Upcoming surgery, procedure or cardioversion:  No    Anticoagulation Episode Summary     Current INR goal:  2.0-3.0   TTR:  82.2 % (1 y)   Next INR check:  4/1/2021   INR from  last check:  1.90 (3/18/2021)   Weekly max warfarin dose:     Target end date:     INR check location:     Preferred lab:     Send INR reminders to:  [facility]    Indications    Long term current use of anticoagulant therapy [Z79.01]  DVT (deep venous thrombosis) (H) (Resolved) [I82.409]           Comments:           Anticoagulation Care Providers     Provider Role Specialty Phone number    Dawson Cloud MD Referring Internal Medicine [phone]

## 2021-06-17 NOTE — PATIENT INSTRUCTIONS - HE
Patient Instructions by Dawson Cloud MD at 1/29/2019  1:00 PM     Author: Dawson Cloud MD Service: -- Author Type: Physician    Filed: 1/29/2019  1:41 PM Encounter Date: 1/29/2019 Status: Addendum    : Dawson Cloud MD (Physician)    Related Notes: Original Note by Dawson Cloud MD (Physician) filed at 1/29/2019  1:39 PM         Patient Education     Exercise for a Healthier Heart  You may wonder how you can improve the health of your heart. If youre thinking about exercise, youre on the right track. You dont need to become an athlete, but you do need a certain amount of brisk exercise to help strengthen your heart. If you have been diagnosed with a heart condition, your doctor may recommend exercise to help stabilize your condition. To help make exercise a habit, choose safe, fun activities.       Be sure to check with your health care provider before starting an exercise program.    Why exercise?  Exercising regularly offers many healthy rewards. It can help you do all of the following:    Improve your blood cholesterol levels to help prevent further heart trouble    Lower your blood pressure to help prevent a stroke or heart attack    Control diabetes, or reduce your risk of getting this disease    Improve your heart and lung function    Reach and maintain a healthy weight    Make your muscles stronger and more limber so you can stay active    Prevent falls and fractures by slowing the loss of bone mass (osteoporosis)    Manage stress better  Exercise tips  Ease into your routine. Set small goals. Then build on them.  Exercise on most days. Aim for a total of 150 or more minutes of moderate to  vigorous intensity activity each week. Consider 40 minutes, 3 to 4 times a week. For best results, activity should last for 40 minutes on average. It is OK to work up to the 40 minute period over time. Examples of moderate-intensity activity is walking one mile in 15 minutes or 30 to  45 minutes of yard work.  Step up your daily activity level. Along with your exercise program, try being more active throughout the day. Walk instead of drive. Do more household tasks or yard work.  Choose one or more activities you enjoy. Walking is one of the easiest things you can do. You can also try swimming, riding a bike, or taking an exercise class.  Stop exercising and call your doctor if you:    Have chest pain or feel dizzy or lightheaded    Feel burning, tightness, pressure, or heaviness in your chest, neck, shoulders, back, or arms    Have unusual shortness of breath    Have increased joint or muscle pain    Have palpitations or an irregular heartbeat      3424-2787 [employer] [address] [place] [address], [place] [address]. All rights reserved. This information is not intended as a substitute for professional medical care. Always follow your healthcare professional's instructions.         Patient Education   Urinary Incontinence, Female (Adult)  Urinary incontinence means loss of control of the bladder. This problem affects many women, especially as they get older. If you have incontinence, you may be embarrassed to ask for help. But know that this problem can be treated.  Types of Incontinence  There are different types of incontinence. Two of the main types are described here. You can have more than one type.    Stress incontinence. With this type, urine leaks when pressure (stress) is put on the bladder. This may happen when you cough, sneeze, or laugh. Stress incontinence most often occurs because the pelvic floor muscles that support the bladder and urethra are weak. This can happen after pregnancy and vaginal childbirth or a hysterectomy. It can also be due to excess body weight or hormone changes.    Urge incontinence (also called overactive bladder). With this type, a sudden urge to urinate is felt often. This may happen even though there may not be much urine in the bladder. The need  to urinate often during the night is common. Urge incontinence most often occurs because of bladder spasms. This may be due to bladder irritation or infection. Damage to bladder nerves or pelvic muscles, constipation, and certain medicines can also lead to urge incontinence.  Treatment of urinary incontinence depends on the cause. Further evaluation is needed to find the type you have. This will likely include an exam and certain tests. Based on the results, you and your healthcare provider can then plan treatment. Until a diagnosis is made, the home care tips below can help relieve symptoms.  Home care    Do pelvic floor muscle exercises, if they are prescribed. The pelvic floor muscles help support the bladder and urethra. Many women find that their symptoms improve when doing special exercises that strengthen these muscles. To do the exercises contract the muscles you would use to stop your stream of urine, but do this when youre not urinating. Hold for 10 seconds, then relax. Repeat 10 to 20 times in a row, at least 3 times a day. Your provider may give you other instructions for how to do the exercises and how often.    Keep a bladder diary. This helps track how often and how much you urinate over a set period of time. Bring this diary with you to your next visit with the provider. The information can help your provider learn more about your bladder problem.    Lose weight, if advised to by your provider. Excess weight puts pressure on the bladder. Your provider can help you create a weight-loss plan thats right for you. This may include exercising more and making certain diet changes.    Don't consume foods and drinks that may irritate the bladder. These can include alcohol and caffeinated drinks.    Quit smoking. Smoking and other tobacco use can lead to chronic cough that strains the pelvic floor muscles. Smoking may also damage the bladder and urethra. Talk with your provider about treatments or methods you  can use to quit smoking.    If drinking large amounts of fluid causes you to have symptoms, you may be advised to limit your fluid intake. You may also be advised to drink most of your fluids during the day and to limit fluids at night.    If youre worried about urine leakage or accidents, you may wear absorbent pads to catch urine. Change the pads often. This helps reduce discomfort. It may also reduce the risk of skin or bladder infections.  Follow-up care  Follow up with your healthcare provider, or as directed. It may take some to find the right treatment for your problem. Your treatment plan may include special therapies or medicines. Certain procedures or surgery may also be options. Be sure to discuss any questions you have with your provider.  When to seek medical advice  Call the healthcare provider right away if any of these occur:    Fever of 100.4 F (38 C) or higher, or as directed by your provider    Bladder pain or fullness    Abdominal swelling    Nausea or vomiting    Back pain    Weakness, dizziness or fainting  Date Last Reviewed: 10/1/2017    2051-1998 [employer]. [address] [place] [address]. All rights reserved. This information is not intended as a substitute for professional medical care. Always follow your healthcare professional's instructions.     Patient Education   Treating Incontinence in Women: Nonsurgical Methods    The best treatment for you will depend on the type of incontinence you have. Your symptoms, age, and any underlying problems that are found also affect your treatment. While some types of incontinence may eventually require surgery, nonsurgical treatments may be effective in many cases. Nonsurgical treatments include lifestyle changes, muscle-strengthening exercises, and medicines.  Nonsurgical Treatments  Treatment for stress urinary incontinence includes:    Bladder training    Lifestyle changes such as weight loss and increased activity if  incontinence is due to being overweight    Medicines, if bladder training has not helped    Pelvic floor muscle exercises  Lifestyle changes    Losing weight. Excess weight puts extra pressure on the pelvic floor muscles. Exercising and eating right can help you lose weight. This helps other treatments work better.    Making certain diet changes. Some foods may make you need to urinate more, so it may be good to avoid them. These include caffeinated drinks and alcohol. Ask your healthcare provider whether these or other diet changes might be helpful.    Quitting smoking. Smoking can lead to a chronic cough that strains pelvic floor muscles. Smoking may also damage the bladder and urethra.  Pelvic floor muscle exercises  There are exercises you can do to help strengthen your pelvic floor muscles. The pelvic floor muscles act as a sling to help hold the bladder and urethra in place. These muscles also help keep the urethra closed. Weak pelvic floor muscles may allow urine to leak. To strengthen the pelvic floor muscles, do the exercises daily. In a few months, the muscles will be stronger and tighter. This can help prevent urine leakage.  Date Last Reviewed: 1/1/2017 2000-2017 [employer]. [address] [place] [address]. All rights reserved. This information is not intended as a substitute for professional medical care. Always follow your healthcare professional's instructions.     Patient Education   Understanding Advance Care Planning  Advance care planning is the process of deciding ones own future medical care. It helps ensure that if you cant speak for yourself, your wishes can still be carried out. The plan is a series of legal documents that note a persons wishes. The documents vary by state. Advance care planning may be done when a person has a serious illness that is expected to get worse. It may be done before major surgery. And it can help you and your family be prepared in case  of a major illness or injury. Advance care planning helps with making decisions at these times.       A health care proxy is a person who acts as the voice of a patient when the patient cant speak for himself or herself. The name of this role varies by state. It may be called a Durable Medical Power of  or Durable Power of  for Healthcare. It may be called an agent, surrogate, or advocate. Or it may be called a representative or decision maker. It is an official duty that is identified by a legal document. The document also varies by state.    Why Is Advance Care Planning Important?  If a person communicates their healthcare wishes:    They will be given medical care that matches their values and goals.    Their family members will not be forced to make decisions in a crisis with no guidance.  Creating a Plan  Making an advance care plan is often done in 3 steps:    Thinking about ones wishes. To create an advance care plan, you should think about what kind of medical treatment you would want if you lose the ability to communicate. Are there any situations in which you would refuse or stop treatment? Are there therapies you would want or not want? And whom do you want to make decisions for you? There are many places to learn more about how to plan for your care. Ask your doctor or  for resources.    Picking a health care proxy. This means choosing a trusted person to speak for you only when you cant speak for yourself. When you cannot make medical decisions, your proxy makes sure the instructions in your advance care plan are followed. A proxy does not make decisions based on his or her own opinions. They must put aside those opinions and values if needed, and carry out your wishes.    Filling out the legal documents. There are several kinds of legal documents for advance care planning. Each one tells health care providers your wishes. The documents may vary by state. They must be signed  and may need to be witnessed or notarized. You can cancel or change them whenever you wish. Depending on your state, the documents may include a Healthcare Proxy form, Living Will, Durable Medical Power of , Advance Directive, or others.  The Familys Role  The best help a family can give is to support their loved ones wishes. Open and honest communication is vital. Family should express any concerns they have about the patients choices while the patient can still make decisions.    7474-4756 [employer]. [address] [place] [address]. All rights reserved. This information is not intended as a substitute for professional medical care. Always follow your healthcare professional's instructions.         Also, [facility] offers a free, downloadable health care directive that allows you to share your treatment choices and personal preferences if you cannot communicate your wishes. It also allows you to appoint another person (called a health care agent) to make health care decisions if you are unable to do so. You can download an advance directive by going here: [email or web address]     Patient Education   Personalized Prevention Plan  You are due for the preventive services outlined below.  Your care team is available to assist you in scheduling these services.  If you have already completed any of these items, please share that information with your care team to update in your medical record.  Health Maintenance   Topic Date Due   ? ZOSTER VACCINES (2 of 3) 02/26/2009   ? FALL RISK ASSESSMENT  01/29/2020   ? DXA SCAN  03/22/2020   ? TD 18+ HE  01/01/2023   ? ADVANCE DIRECTIVES DISCUSSED WITH PATIENT  01/29/2024   ? PNEUMOCOCCAL POLYSACCHARIDE VACCINE AGE 65 AND OVER  Completed   ? INFLUENZA VACCINE RULE BASED  Completed   ? PNEUMOCOCCAL CONJUGATE VACCINE FOR ADULTS (PCV13 OR PREVNAR)  Completed         Recommending new shingles vaccine,  Shingrix  Continue annual mammograms

## 2021-06-17 NOTE — TELEPHONE ENCOUNTER
ANTICOAGULATION  MANAGEMENT    Assessment     Today's INR result of 2.2 is Therapeutic (goal INR of 2.0-3.0)        Warfarin taken as previously instructed    No new diet changes affecting INR    No new medication/supplements affecting INR    Continues to tolerate warfarin with no reported s/s of bleeding or thromboembolism     Previous INR was Therapeutic    Plan:     Spoke on phone with Ginna regarding INR result and instructed:      Warfarin Dosing Instructions:  Continue current warfarin dose 1 mg daily on Mondays, Wednesdays and Fridays; and 2 mg daily rest of week  (0 % change)    Instructed patient to follow up no later than: 4 weeks.    Education provided: importance of therapeutic range and target INR goal and significance of current INR result    Ginna verbalizes understanding and agrees to warfarin dosing plan.    Instructed to call the [facility] for any changes, questions or concerns. (#[phone])   ?   Gretta Maxwell RN    Subjective/Objective:      Ginna Quiroga, a 86 y.o. female is on warfarin. Ginna Chacko reports:     Home warfarin dose: verbally confirmed home dose with Ginna and updated on anticoagulation calendar     Missed doses: No     Medication changes:  No     S/S of bleeding or thromboembolism:  No     New Injury or illness:  No     Changes in diet or alcohol consumption:  No     Upcoming surgery, procedure or cardioversion:  No    Anticoagulation Episode Summary     Current INR goal:  2.0-3.0   TTR:  80.9 % (1 y)   Next INR check:  5/27/2021   INR from last check:  2.20 (4/29/2021)   Weekly max warfarin dose:     Target end date:     INR check location:     Preferred lab:     Send INR reminders to:  PATRICIA BLACKBURN    Indications    Long term current use of anticoagulant therapy [Z79.01]  DVT (deep venous thrombosis) (H) (Resolved) [I82.409]           Comments:           Anticoagulation Care Providers     Provider Role Specialty Phone number    Pedro Luis  Dawson BOSTON MD [facility] Internal Medicine [phone]

## 2021-06-17 NOTE — TELEPHONE ENCOUNTER
Telephone Encounter by Gretta Maxwell RN at 3/4/2021  3:19 PM     Author: Gretta Maxwell RN Service: -- Author Type: Registered Nurse    Filed: 3/4/2021  3:27 PM Encounter Date: 3/4/2021 Status: Signed    : Gretta Maxwell RN (Registered Nurse)       ANTICOAGULATION  MANAGEMENT    Assessment     Today's INR result of 1.6 is Subtherapeutic (goal INR of 2.0-3.0)        Warfarin taken as previously instructed    No new diet changes affecting INR    No new medication/supplements affecting INR    Continues to tolerate warfarin with no reported s/s of bleeding or thromboembolism     Previous INR was Therapeutic    Plan:     Spoke on phone with Ginna regarding INR result and instructed:      Warfarin Dosing Instructions:  Change warfarin dose to 1 mg daily on Mondays, wednesdays and Fridays; and 2 mg daily rest of week  (10 % change)    Instructed patient to follow up no later than: two weeks. She has office visit on 3/18    Education provided: importance of consistent vitamin K intake, impact of vitamin K foods on INR, importance of therapeutic range, target INR goal and significance of current INR result, importance of following up for INR monitoring at instructed interval and importance of taking warfarin as instructed    Ginna verbalizes understanding and agrees to warfarin dosing plan.    Instructed to call the [facility] for any changes, questions or concerns. (#[phone])   ?   Gretta Maxwell RN    Subjective/Objective:      Ginna Quiroga, a 86 y.o. female is on warfarin. Ginna Chacko reports:     Home warfarin dose: verbally confirmed home dose with Ginna and updated on anticoagulation calendar     Missed doses: No     Medication changes:  No     S/S of bleeding or thromboembolism:  No     New Injury or illness:  No     Changes in diet or alcohol consumption:  No she is not much of a green eater in general.     Upcoming surgery, procedure or cardioversion:   No    Anticoagulation Episode Summary     Current INR goal:  2.0-3.0   TTR:  86.0 % (1 y)   Next INR check:  3/18/2021   INR from last check:  1.60 (3/4/2021)   Weekly max warfarin dose:     Target end date:     INR check location:     Preferred lab:     Send INR reminders to:  PATRICIA BLACKBURN    Indications    Long term current use of anticoagulant therapy [Z79.01]  DVT (deep venous thrombosis) (H) (Resolved) [I82.409]           Comments:           Anticoagulation Care Providers     Provider Role Specialty Phone number    Dawson Cloud MD Referring Internal Medicine [phone]

## 2021-06-17 NOTE — TELEPHONE ENCOUNTER
Telephone Encounter by Ольга Forbes RN at 2/4/2021  2:57 PM     Author: Ольга Forbes RN Service: -- Author Type: Registered Nurse    Filed: 2/4/2021  3:21 PM Encounter Date: 2/4/2021 Status: Signed    : Ольга Forbes RN (Registered Nurse)       ANTICOAGULATION  MANAGEMENT    Assessment     Today's INR result of 3.1 is Supratherapeutic (goal INR of 2.0-3.0)        Warfarin taken as previously instructed    No new diet changes affecting INR    No new medication/supplements affecting INR    Continues to tolerate warfarin with no reported s/s of bleeding or thromboembolism     Previous INR was Therapeutic       Plan:     Spoke on phone with Ginna regarding INR result and instructed:      Warfarin Dosing Instructions:  Change warfarin dose to    2 mg every Mon, Wed, Fri; 1 mg all other days        (5 % change)    Instructed patient to follow up no later than: 2 weeks- patient prefers to call to make appointment.    Education provided: importance of taking warfarin as instructed    Ginna verbalizes understanding and agrees to warfarin dosing plan.    Instructed to call the [facility] for any changes, questions or concerns. (#[phone])   ?   Ольга Forbes RN    Subjective/Objective:      Ginna Quiroga, a 86 y.o. female is on warfarin. Ginna Chacko reports:     Home warfarin dose: as updated on anticoagulation calendar per template     Missed doses: No     Medication changes:  No     S/S of bleeding or thromboembolism:  No     New Injury or illness:  No     Changes in diet or alcohol consumption:  No     Upcoming surgery, procedure or cardioversion:  No    Anticoagulation Episode Summary     Current INR goal:  2.0-3.0   TTR:  89.5 % (1 y)   Next INR check:  2/18/2021   INR from last check:  3.10 (2/4/2021)   Weekly max warfarin dose:     Target end date:     INR check location:     Preferred lab:     Send INR reminders to:  [facility]    Indications    Long term  current use of anticoagulant therapy [Z79.01]  DVT (deep venous thrombosis) (H) (Resolved) [I82.409]           Comments:           Anticoagulation Care Providers     Provider Role Specialty Phone number    Dawson Cloud MD Referring Internal Medicine [phone]

## 2021-06-18 NOTE — LETTER
Letter by Dawson Cloud MD at      Author: Dawson Cloud MD Service: -- Author Type: --    Filed:  Encounter Date: 2/28/2019 Status: (Other)        [facility] INTERNAL MEDICINE  [address] [place] [address] [address]  [facility] [id number]  [phone]         Ginna Quiroga  [address] [place] [address]        02/28/19    Dear Ginna     At [facility] we care about your health and well-being. Your primary care provider is committed to ensuring you receive high quality care and has chosen a network of specialists to assist in providing that care. Recently Dr. Cloud referred you to [facility] for specialty care. They have made several attempts to connect with you to assist with scheduling, however they have been unable to reach you by phone.       It is important to your overall health to follow through with the recommendation from your provider. Please call [phone] at your earliest convenience for assistance in scheduling an appointment.  If you have already scheduled this appointment, please disregard this notice.  Thank you for choosing [facility] for your healthcare needs.       Sincerely,       [facility] Specialty Scheduling

## 2021-06-18 NOTE — LETTER
Letter by Dawson Cloud MD at      Author: Dawson Cloud MD Service: -- Author Type: --    Filed:  Encounter Date: 2/3/2019 Status: (Other)       Ginna Quiroga  [address] [place] [address]             February 3, 2019         Dear Ginna,    Below are the results from your recent visit:    Resulted Orders   Comprehensive Metabolic Panel   Result Value Ref Range    Sodium 141 136 - 145 mmol/L    Potassium 3.8 3.5 - 5.0 mmol/L    Chloride 101 98 - 107 mmol/L    CO2 29 22 - 31 mmol/L    Anion Gap, Calculation 11 5 - 18 mmol/L    Glucose 89 70 - 125 mg/dL    BUN 27 8 - 28 mg/dL    Creatinine 1.29 (H) 0.60 - 1.10 mg/dL    GFR MDRD Af Amer 48 (L) >60 mL/min/1.73m2    GFR MDRD Non Af Amer 39 (L) >60 mL/min/1.73m2    Bilirubin, Total 0.7 0.0 - 1.0 mg/dL    Calcium 10.2 8.5 - 10.5 mg/dL    Protein, Total 7.8 6.0 - 8.0 g/dL    Albumin 4.0 3.5 - 5.0 g/dL    Alkaline Phosphatase 77 45 - 120 U/L    AST 17 0 - 40 U/L    ALT 18 0 - 45 U/L    Narrative    Fasting Glucose reference range is 70-99 mg/dL per  American Diabetes Association (ADA) guidelines.   Lipid Cascade   Result Value Ref Range    Cholesterol 210 (H) <=199 mg/dL    Triglycerides 111 <=149 mg/dL    HDL Cholesterol 55 >=50 mg/dL    LDL Calculated 133 (H) <=129 mg/dL    Patient Fasting > 8hrs? Yes    Urinalysis   Result Value Ref Range    Color, UA Yellow Colorless, Yellow, Straw, Light Yellow    Clarity, UA Hazy (!) Clear    Glucose, UA Negative Negative    Bilirubin, UA Negative Negative    Ketones, UA Negative Negative    Specific Gravity, UA 1.005 1.001 - 1.030    Blood, UA Small (!) Negative    pH, UA 7.0 4.5 - 8.0    Protein, UA Negative Negative mg/dL    Urobilinogen, UA <2.0 E.U./dL <2.0 E.U./dL, 2.0 E.U./dL    Nitrite, UA Negative Negative    Leukocytes, UA Negative Negative   Hemoglobin   Result Value Ref Range    Hemoglobin 13.1 12.0 - 16.0 g/dL   Vitamin D, Total (25-Hydroxy)   Result Value Ref Range    Vitamin D, Total (25-Hydroxy)  43.7 30.0 - 80.0 ng/mL    Narrative    Deficiency <10.0 ng/mL  Insufficiency 10.0-29.9 ng/mL  Sufficiency 30.0-80.0 ng/mL  Toxicity (possible) >100.0 ng/mL       Cholesterol is mildly higher than last year.  The LDL cholesterol should be under 130.  No diabetes with a fasting glucose of 89.  Kidney function okay although creatinine is slightly higher than last time.  This will be rechecked in April when you return.  Normal liver studies.  Vitamin D level is looking much better taking 5000 units daily.  No anemia.    Please call with questions or contact us using [employer].    Sincerely,        Electronically signed by Dawson Cloud MD

## 2021-06-18 NOTE — PATIENT INSTRUCTIONS - HE
Patient Instructions by Dawson Cloud MD at 6/5/2020  1:00 PM     Author: Dawson Cloud MD Service: -- Author Type: Physician    Filed: 6/5/2020  1:30 PM Encounter Date: 6/5/2020 Status: Addendum    : Dawson Cloud MD (Physician)    Related Notes: Original Note by Dawson Cloud MD (Physician) filed at 6/5/2020  1:24 PM         Patient Education     Exercise for a Healthier Heart  You may wonder how you can improve the health of your heart. If youre thinking about exercise, youre on the right track. You dont need to become an athlete, but you do need a certain amount of brisk exercise to help strengthen your heart. If you have been diagnosed with a heart condition, your doctor may recommend exercise to help stabilize your condition. To help make exercise a habit, choose safe, fun activities.       Be sure to check with your health care provider before starting an exercise program.    Why exercise?  Exercising regularly offers many healthy rewards. It can help you do all of the following:    Improve your blood cholesterol levels to help prevent further heart trouble    Lower your blood pressure to help prevent a stroke or heart attack    Control diabetes, or reduce your risk of getting this disease    Improve your heart and lung function    Reach and maintain a healthy weight    Make your muscles stronger and more limber so you can stay active    Prevent falls and fractures by slowing the loss of bone mass (osteoporosis)    Manage stress better  Exercise tips  Ease into your routine. Set small goals. Then build on them.  Exercise on most days. Aim for a total of 150 or more minutes of moderate to  vigorous intensity activity each week. Consider 40 minutes, 3 to 4 times a week. For best results, activity should last for 40 minutes on average. It is OK to work up to the 40 minute period over time. Examples of moderate-intensity activity is walking one mile in 15 minutes or 30 to 45  minutes of yard work.  Step up your daily activity level. Along with your exercise program, try being more active throughout the day. Walk instead of drive. Do more household tasks or yard work.  Choose one or more activities you enjoy. Walking is one of the easiest things you can do. You can also try swimming, riding a bike, or taking an exercise class.  Stop exercising and call your doctor if you:    Have chest pain or feel dizzy or lightheaded    Feel burning, tightness, pressure, or heaviness in your chest, neck, shoulders, back, or arms    Have unusual shortness of breath    Have increased joint or muscle pain    Have palpitations or an irregular heartbeat      [id number] [employer]. [address] [place] [address], [place] [address]. All rights reserved. This information is not intended as a substitute for professional medical care. Always follow your healthcare professional's instructions.           Advance Directive  Patients advance directive was discussed and I am comfortable with the patients wishes.  Patient Education   Personalized Prevention Plan  You are due for the preventive services outlined below.  Your care team is available to assist you in scheduling these services.  If you have already completed any of these items, please share that information with your care team to update in your medical record.  Health Maintenance   Topic Date Due   ? ZOSTER VACCINES (2 of 3) 02/26/2009   ? DXA SCAN  03/22/2020   ? MEDICARE ANNUAL WELLNESS VISIT  06/05/2021   ? FALL RISK ASSESSMENT  06/05/2021   ? TD 18+ HE  01/01/2023   ? ADVANCE CARE PLANNING  06/05/2025   ? PNEUMOCOCCAL IMMUNIZATION 65+ LOW/MEDIUM RISK  Completed   ? INFLUENZA VACCINE RULE BASED  Completed         Recommending new shingles vaccine, Shingrix.  This can be obtained at your pharmacy.    Continue annual flu shots every fall    Schedule mammogram for breast cancer screening and continue annually    Repeat DEXA in 2023 for osteoporosis  screening    Continue annual eye exams    Recheck blood pressure at upcoming lab appointment

## 2021-06-19 NOTE — LETTER
Letter by Dawson Cloud MD at      Author: Dawson Cloud MD Service: -- Author Type: --    Filed:  Encounter Date: 6/6/2019 Status: (Other)         Ginna Quiroga  [address] [place] [address]             June 6, 2019         Dear Ginna,    Below are the results from your recent visit:    Resulted Orders   Basic Metabolic Panel   Result Value Ref Range    Sodium 141 136 - 145 mmol/L    Potassium 4.5 3.5 - 5.0 mmol/L    Chloride 104 98 - 107 mmol/L    CO2 31 22 - 31 mmol/L    Anion Gap, Calculation 6 5 - 18 mmol/L    Glucose 81 70 - 125 mg/dL    Calcium 9.6 8.5 - 10.5 mg/dL    BUN 32 (H) 8 - 28 mg/dL    Creatinine 1.49 (H) 0.60 - 1.10 mg/dL    GFR MDRD Af Amer 40 (L) >60 mL/min/1.73m2    GFR MDRD Non Af Amer 33 (L) >60 mL/min/1.73m2    Narrative    Fasting Glucose reference range is 70-99 mg/dL per  American Diabetes Association (ADA) guidelines.       Your kidney function is better than on the last report.  Still some mild impairment but this was also previously seen.  I would continue the current dose of enalapril.    Please call with questions or contact us using [employer].    Sincerely,      Electronically signed by Dawson Cloud MD

## 2021-06-19 NOTE — LETTER
Letter by Dawson Cloud MD at      Author: Dawson Cloud MD Service: -- Author Type: --    Filed:  Encounter Date: 5/1/2019 Status: (Other)         Ginna Quiroga  [address] [place] [address]             May 1, 2019         Dear Ginna,    Below are the results from your recent visit:    Resulted Orders   Basic Metabolic Panel   Result Value Ref Range    Sodium 141 136 - 145 mmol/L    Potassium 4.1 3.5 - 5.0 mmol/L    Chloride 105 98 - 107 mmol/L    CO2 26 22 - 31 mmol/L    Anion Gap, Calculation 10 5 - 18 mmol/L    Glucose 90 70 - 125 mg/dL    Calcium 9.4 8.5 - 10.5 mg/dL    BUN 34 (H) 8 - 28 mg/dL    Creatinine 1.57 (H) 0.60 - 1.10 mg/dL    GFR MDRD Af Amer 38 (L) >60 mL/min/1.73m2    GFR MDRD Non Af Amer 31 (L) >60 mL/min/1.73m2    Narrative    Fasting Glucose reference range is 70-99 mg/dL per  American Diabetes Association (ADA) guidelines.       Your creatinine which is a measure of kidney function is higher than last time.  Not sure if the increased dose of enalapril is a contributing factor.  Make sure you are drinking plenty of water every day.  I would like this to be rechecked next month when you have your INR drawn.  Make sure you let the lab know that day that there is additional blood that needs to be drawn.    Please call with questions or contact us using [employer]Box.    Sincerely,        Electronically signed by Dawson Cloud MD

## 2021-06-20 NOTE — LETTER
Letter by Dawson Cloud MD at      Author: Dawson Cloud MD Service: -- Author Type: --    Filed:  Encounter Date: 6/12/2020 Status: (Other)         Ginna Quiroga  [address] [place] [address]             June 12, 2020         Dear Ginna,    Below are the results from your recent visit:    Resulted Orders   Lipid Cascade, FASTING   Result Value Ref Range    Cholesterol 180 <=199 mg/dL    Triglycerides 89 <=149 mg/dL    HDL Cholesterol 49 (L) >=50 mg/dL    LDL Calculated 113 <=129 mg/dL    Patient Fasting > 8hrs? Yes    Comprehensive Metabolic Panel   Result Value Ref Range    Sodium 140 136 - 145 mmol/L    Potassium 3.9 3.5 - 5.0 mmol/L    Chloride 102 98 - 107 mmol/L    CO2 28 22 - 31 mmol/L    Anion Gap, Calculation 10 5 - 18 mmol/L    Glucose 80 70 - 125 mg/dL    BUN 32 (H) 8 - 28 mg/dL    Creatinine 1.42 (H) 0.60 - 1.10 mg/dL    GFR MDRD Af Amer 43 (L) >60 mL/min/1.73m2    GFR MDRD Non Af Amer 35 (L) >60 mL/min/1.73m2    Bilirubin, Total 0.7 0.0 - 1.0 mg/dL    Calcium 9.1 8.5 - 10.5 mg/dL    Protein, Total 6.7 6.0 - 8.0 g/dL    Albumin 3.7 3.5 - 5.0 g/dL    Alkaline Phosphatase 86 45 - 120 U/L    AST 17 0 - 40 U/L    ALT 14 0 - 45 U/L    Narrative    Fasting Glucose reference range is 70-99 mg/dL per  American Diabetes Association (ADA) guidelines.   HM2(CBC w/o Differential)   Result Value Ref Range    WBC 8.0 4.0 - 11.0 thou/uL    RBC 4.35 3.80 - 5.40 mill/uL    Hemoglobin 12.8 12.0 - 16.0 g/dL    Hematocrit 37.8 35.0 - 47.0 %    MCV 87 80 - 100 fL    MCH 29.3 27.0 - 34.0 pg    MCHC 33.8 32.0 - 36.0 g/dL    RDW 12.9 11.0 - 14.5 %    Platelets 267 140 - 440 thou/uL    MPV 6.9 (L) 7.0 - 10.0 fL   Vitamin D, Total (25-Hydroxy)   Result Value Ref Range    Vitamin D, Total (25-Hydroxy) 43.6 30.0 - 80.0 ng/mL    Narrative    Deficiency <10.0 ng/mL  Insufficiency 10.0-29.9 ng/mL  Sufficiency 30.0-80.0 ng/mL  Toxicity (possible) >100.0 ng/mL       Your cholesterol looks good and is lower than  last year.  Vitamin D level is much improved.  Your kidney function is mildly impaired but has been stable for the last several years.  This is not a surprising finding in someone in your age group.  You should avoid OTC NSAIDs such as ibuprofen and Alleve.  No diabetes with fasting glucose of 80.  Normal liver studies.  No anemia.    Please call with questions or contact us using [employer].    Sincerely,        Electronically signed by Dawson Cloud MD

## 2021-06-20 NOTE — LETTER
Letter by Lyubov Hall CNP at      Author: Lyubov Hall CNP Service: -- Author Type: --    Filed:  Encounter Date: 10/7/2020 Status: (Other)         Patient: Ginna Quiroga   MR Number: [id number]   YOB: 1934   Date of Visit: 10/7/2020     [facility]    Facility:   [facility] [[id number]]   Code Status: FULL CODE and POLST AVAILABLE      CHIEF COMPLAINT/REASON FOR VISIT:  Chief Complaint   Patient presents with   ? Review Of Multiple Medical Conditions     physical deconditioning, tachy-kayleen syndrome, s/p pacemaker insertion, pelvis fracture, knee pain       HISTORY:      HPI: Ginna is a 86 y.o. female who  has a past medical history of Atrial fibrillation with RVR (H), CKD (chronic kidney disease) stage 3, GFR 30-59 ml/min, Disorder of ligament of foot (11/28/2016), Essential hypertension (11/28/2016), Family history of colon cancer, Glaucoma, History of pulmonary embolus (PE) (11/28/2016), LBBB (left bundle branch block), Osteoarthritis of multiple joints (11/28/2016), Osteopenia, Peripheral edema (01/15/2018), Polyarthritis rheumatica (H), Tachy-kayleen syndrome (H), and Vitamin D deficiency.  1 Cristal was recently admitted to [facility] from September 20, 2020 and was discharged on September 29, 2020.  She was admitted for tachy-kayleen syndrome and a pacemaker was placed.  The discharging provider summarized the hospitalization in previous notes.     Today when he is being evaluated for a routine review of multiple medical problems while in the TCU. She shares she has been doing quite well other than the knee pain. She has continued with therapies, however she does have significant knee pain. She has had injections before and is requesting injections while she is in the facility to help her with pain management in her knees. Ginna states she otherwise has not had any problems. She has not had any issues with chest symptoms or  lightheadedness/dizziness. She has been eating, drinking and eliminating well. Ginna is looking forward to getting stronger and being able to get home. Ginna denies any other concerns including fevers/chills, cough or cold symptoms, headaches, vision changes, chest pain/pressure, difficulty breathing, SOB, abdominal pain, nausea, vomiting, diarrhea, dysuria, increasing weakness, increasing pain.     Past Medical History:   Diagnosis Date   ? Atrial fibrillation with RVR (H)    ? CKD (chronic kidney disease) stage 3, GFR 30-59 ml/min    ? Disorder of ligament of foot 2016   ? Essential hypertension 2016   ? Family history of colon cancer     Declines having colonoscopy   ? Glaucoma    ? History of pulmonary embolus (PE) 2016    Factor V Leiden mutation, family history pulmonary embolus   ? LBBB (left bundle branch block)    ? Osteoarthritis of multiple joints 2016   ? Osteopenia     DXA 2018 T score -1.3, previous DXA normal    ? Peripheral edema 01/15/2018   ? Polyarthritis rheumatica (H)    ? Tachy-kayleen syndrome (H)    ? Vitamin D deficiency              Family History   Problem Relation Age of Onset   ? Pulmonary embolism Mother          at age 51   ? Heart attack Father          in his 70s   ? Colon cancer Brother            ? Kidney failure Sister    ? Prostate cancer Son    ? Hodgkin's lymphoma Son      Social History     Socioeconomic History   ? Marital status:      Spouse name: Not on file   ? Number of children: Not on file   ? Years of education: Not on file   ? Highest education level: Not on file   Occupational History   ? Not on file   Social Needs   ? Financial resource strain: Not on file   ? Food insecurity     Worry: Not on file     Inability: Not on file   ? Transportation needs     Medical: Not on file     Non-medical: Not on file   Tobacco Use   ? Smoking status: Never Smoker   ? Smokeless tobacco: Never Used   Substance and Sexual  Activity   ? Alcohol use: Yes   ? Drug use: Not on file   ? Sexual activity: Not on file   Lifestyle   ? Physical activity     Days per week: Not on file     Minutes per session: Not on file   ? Stress: Not on file   Relationships   ? Social connections     Talks on phone: Not on file     Gets together: Not on file     Attends Yazidism service: Not on file     Active member of club or organization: Not on file     Attends meetings of clubs or organizations: Not on file     Relationship status: Not on file   ? Intimate partner violence     Fear of current or ex partner: Not on file     Emotionally abused: Not on file     Physically abused: Not on file     Forced sexual activity: Not on file   Other Topics Concern   ? Not on file   Social History Narrative     2001  [place]    6 sons and multiple grandchildren, also has great grandchildren.       REVIEW OF SYSTEM:  Per HPI    PHYSICAL EXAM:   /69   Pulse 70   Temp 96.9  F (36.1  C)   Resp 18   Wt 176 lb (79.8 kg)   SpO2 97%   BMI 29.74 kg/m      A limited exam was performed due to recommendations for care during COVID-19 pandemic. Due to the 2020 COVID-19 pandemic, except as noted above, the patient was visually observed at a 6 foot plus distance.  An observational exam was performed in an effort to keep patient safe from COVID-19 and other communicable diseases.     General appearance: alert, appears stated age and cooperative  HEENT: Head is normocephalic with normal hair distribution. No evidence of trauma. Ears: Without lesions or deformity. No acute purulent discharge. Eyes: Conjunctivae pink with no scleral icterus or erythema. Nose: Normal. Oropharnyx: mmm.  Lungs: respirations without effort.  Extremities: extremities normal, atraumatic, no cyanosis.  Skin: Skin color, texture normal. No rashes or lesions on exposed skin.   Neurologic: Grossly normal   Psych: interacts well with caregivers, exhibits logical thought processes and  "connections, pleasant.      LABS:   None today.     ASSESSMENT:      ICD-10-CM    1. Physical deconditioning  R53.81    2. Primary osteoarthritis involving multiple joints  M89.49    3. Multiple closed fractures of pelvis without disruption of pelvic ring with routine healing  S32.82XD    4. Tachy-kayleen syndrome (H)  I49.5    5. Status post biventricular cardiac pacemaker insertion  Z95.0        PLAN:    Care plan reviewed and remains appropriate. Therapies to continue.     Physical Deconditioning  -Continue PT/OT and other therapies as per care plan.  -Encouraged good nutrition and movement habits.   -Discussed care plan and expected course of stay.   -Continue to follow-up per routine schedule or sooner if needed.     Tachy kayleen Syndrome, s/p Pacemaker Insertions  -To follow with cardiology.   -Metoprolol 100 mg by mouth daily.   -Vasotec 10 mg by mouth two times a day.     Pelvis Fractures  -Tylenol 325 mg by mouth every 4 hours as needed.   -Oxycodone 2.5 mg by mouth every 6 hours as needed.     Osteoarthritis  -Bilateral knee x rays.   -If appropriate for injections, order the following supplies:   Kenalog 40 mg x 2 bottles, Lidocaine 2% 20 ml bottle, draw needles, two 1.5\" 25 g needles, two 10 ml syringes, 6 betadine swabs.   -Follow up with xrays.     Admission history and physical per MD in the next 30 days. At this time continue current care plan for all chronic medical conditions, as they are stable. Encouraged patient to engage in PT/OT for strengthening and conditioning. Encouraged patient to work closely with nursing staff to ensure any medical complaints are quickly addressed. Follow up this week or sooner if needed. Will continue to monitor patient and work with nursing staff collaboratively to work toward positive patient outcomes.    Electronically signed by: Lyubov Hall CNP           "

## 2021-06-20 NOTE — LETTER
Letter by Lyubov Hall CNP at      Author: Lyubov Hall CNP Service: -- Author Type: --    Filed:  Encounter Date: 10/9/2020 Status: (Other)         Patient: Ginna Quiroga   MR Number: [id number]   YOB: 1934   Date of Visit: 10/9/2020     [facility]    Facility:   [facility] [[id number]]   Code Status: FULL CODE and POLST AVAILABLE      CHIEF COMPLAINT/REASON FOR VISIT:  Chief Complaint   Patient presents with   ? Procedure     knee injections       HISTORY:      HPI: Ginna is a 86 y.o. female who  has a past medical history of Atrial fibrillation with RVR (H), CKD (chronic kidney disease) stage 3, GFR 30-59 ml/min, Disorder of ligament of foot (11/28/2016), Essential hypertension (11/28/2016), Family history of colon cancer, Glaucoma, History of pulmonary embolus (PE) (11/28/2016), LBBB (left bundle branch block), Osteoarthritis of multiple joints (11/28/2016), Osteopenia, Peripheral edema (01/15/2018), Polyarthritis rheumatica (H), Tachy-kayleen syndrome (H), and Vitamin D deficiency.  1 Cristal was recently admitted to [facility] from September 20, 2020 and was discharged on September 29, 2020.  She was admitted for tachy-kayleen syndrome and a pacemaker was placed.  The discharging provider summarized the hospitalization in previous notes.     Today when he is being evaluated for knee injections today. She has requested knee injections after having significant pain that was limiting therapies. She states she has had injections before and they have been quite helpful. She is very willing to undergo this procedure again in hopes that she will be making vas improvements with therapies. Ginna does understand the risks and benefits including risk of skin atrophy, muscle atrophy, joint atrophy, infection, death and disability or ineffective pain management. Alternative therapies have been discussed and she elects to continue with knee injections.     Past  Medical History:   Diagnosis Date   ? Atrial fibrillation with RVR (H)    ? CKD (chronic kidney disease) stage 3, GFR 30-59 ml/min    ? Disorder of ligament of foot 2016   ? Essential hypertension 2016   ? Family history of colon cancer     Declines having colonoscopy   ? Glaucoma    ? History of pulmonary embolus (PE) 2016    Factor V Leiden mutation, family history pulmonary embolus   ? LBBB (left bundle branch block)    ? Osteoarthritis of multiple joints 2016   ? Osteopenia     DXA 2018 T score -1.3, previous DXA normal    ? Peripheral edema 01/15/2018   ? Polyarthritis rheumatica (H)    ? Tachy-kayleen syndrome (H)    ? Vitamin D deficiency              Family History   Problem Relation Age of Onset   ? Pulmonary embolism Mother          at age 51   ? Heart attack Father          in his 70s   ? Colon cancer Brother            ? Kidney failure Sister    ? Prostate cancer Son    ? Hodgkin's lymphoma Son      Social History     Socioeconomic History   ? Marital status:      Spouse name: Not on file   ? Number of children: Not on file   ? Years of education: Not on file   ? Highest education level: Not on file   Occupational History   ? Not on file   Social Needs   ? Financial resource strain: Not on file   ? Food insecurity     Worry: Not on file     Inability: Not on file   ? Transportation needs     Medical: Not on file     Non-medical: Not on file   Tobacco Use   ? Smoking status: Never Smoker   ? Smokeless tobacco: Never Used   Substance and Sexual Activity   ? Alcohol use: Yes   ? Drug use: Not on file   ? Sexual activity: Not on file   Lifestyle   ? Physical activity     Days per week: Not on file     Minutes per session: Not on file   ? Stress: Not on file   Relationships   ? Social connections     Talks on phone: Not on file     Gets together: Not on file     Attends Mandaeism service: Not on file     Active member of club or organization: Not on file      Attends meetings of clubs or organizations: Not on file     Relationship status: Not on file   ? Intimate partner violence     Fear of current or ex partner: Not on file     Emotionally abused: Not on file     Physically abused: Not on file     Forced sexual activity: Not on file   Other Topics Concern   ? Not on file   Social History Narrative     2001  [place]    6 sons and multiple grandchildren, also has great grandchildren.       REVIEW OF SYSTEM:  Per HPI    PHYSICAL EXAM:   /79   Pulse 73   Temp 97.5  F (36.4  C)   Resp 18   Wt 177 lb 6.4 oz (80.5 kg)   SpO2 95%   BMI 29.98 kg/m      A limited exam was performed due to recommendations for care during COVID-19 pandemic. Due to the 2020 COVID-19 pandemic, except as noted above, the patient was visually observed at a 6 foot plus distance.  An observational exam was performed in an effort to keep patient safe from COVID-19 and other communicable diseases.     General appearance: alert, appears stated age and cooperative  HEENT: Head is normocephalic with normal hair distribution. No evidence of trauma. Ears: Without lesions or deformity. No acute purulent discharge. Eyes: Conjunctivae pink with no scleral icterus or erythema. Nose: Normal. Oropharnyx: mmm.  Lungs: respirations without effort.  Extremities: extremities normal, atraumatic, no cyanosis.  Skin: Skin color, texture normal. No rashes or lesions on exposed skin.   Neurologic: Grossly normal   Psych: interacts well with caregivers, exhibits logical thought processes and connections, pleasant.      LABS:   None today.     ASSESSMENT:      ICD-10-CM    1. Primary osteoarthritis involving multiple joints  M89.49        PLAN:    Care plan reviewed and remains appropriate. Therapies to continue.     Physical Deconditioning  -Continue PT/OT and other therapies as per care plan.  -Encouraged good nutrition and movement habits.   -Discussed care plan and expected course of stay.  "  -Continue to follow-up per routine schedule or sooner if needed.     Tachy kayleen Syndrome, s/p Pacemaker Insertions  -To follow with cardiology.   -Metoprolol 100 mg by mouth daily.   -Vasotec 10 mg by mouth two times a day.     Pelvis Fractures  -Tylenol 325 mg by mouth every 4 hours as needed.   -Oxycodone 2.5 mg by mouth every 6 hours as needed.     Osteoarthritis  -knee injections today per procedure note:     Procedure Note    Procedure:  Bilateral knee injections    Diagnosis: Osteoarthritis    Anesthesia/Sedation: Lidocaine 2%, local anesthetic    Injectable Lot #'s:  Methyprednisolone x 2 bottles: [id number] exp. 06/2022  Lidocaine 2%: -DK exp. 10/1/2021    Procedure Details: The intended procedure, risks (infection, skin atrophy, ineffective pain management), and alternatives (PT/OT, oral pain management) were discussed with the patient and informed consent was obtained. \"Pause for the cause\" was utilized to identify correct patient and intended procedure. The joint was cleansed with betadine and allowed to dry. 6.5 cc of Lidocaine mixed with 40 mg of Kenalog was injected into the right knee using a bia-lateral approach. The procedure was repeated with the left knee using the same mixture and amounts. The patient tolerated the procedure well.     Pain:   Pre procedure: 8/10 when walking or moving, 8/10 when sitting  Post procedure: 1/10    Findings: successful joint injection     Specimens: None      Admission history and physical per MD in the next 30 days. At this time continue current care plan for all chronic medical conditions, as they are stable. Encouraged patient to engage in PT/OT for strengthening and conditioning. Encouraged patient to work closely with nursing staff to ensure any medical complaints are quickly addressed. Follow up this week or sooner if needed. Will continue to monitor patient and work with nursing staff collaboratively to work toward positive patient " outcomes.    Electronically signed by: Lyubov Hall CNP

## 2021-06-20 NOTE — LETTER
Letter by Reji Ta MD at      Author: Reji Ta MD Service: -- Author Type: --    Filed:  Encounter Date: 10/8/2020 Status: (Other)         Patient: Ginna Quiroga   MR Number: [id number]   YOB: 1934   Date of Visit: 10/8/2020      [facility] Care [facility]/ Geriatrics    Facility:  [facility] [[id number]]    Code Status:  FULL CODE    Chief Complaint   Patient presents with   ? H & P   :                    Patient Active Problem List   Diagnosis   ? Long term current use of anticoagulant therapy   ? Essential hypertension   ? Osteoarthritis of multiple joints   ? Glaucoma   ? History of pulmonary embolus (PE)   ? Family history of colon cancer   ? Peripheral edema   ? Osteopenia   ? Vitamin D deficiency   ? Left foot pain   ? Physical deconditioning   ? Tachy-kayleen syndrome (H)   ? Status post biventricular cardiac pacemaker insertion   ? Multiple closed fractures of pelvis without disruption of pelvic ring with routine healing   ? Advance care planning   ? Functional diarrhea   ? Anticoagulation management encounter       History:  Ginna Quiroga  is an 86 year old female with history of PE/factor V Leiden, chronic warfarin anticoagulation, CKD 3, hypertension, glaucoma, and now new onset atrial fibrillation, status post permanent pacemaker placement 9/25/2000 and AV anibal ablation 9/28/2000 for tachybradycardia syndrome seen for admission to TCU on 10/8/2020    Hospital Course: Patient was hospitalized at [place] September 20 through September 29 following a syncopal fall at home in which she suffered pelvic fractures.    Patient was found to have new onset atrial fibrillation with tachybradycardia syndrome requiring permanent pacemaker placement September 25, 2000 and ongoing tachycardia thereafter resulting in AV anibal ablation 9/28/2000.  Patient is treated with metoprolol  mg daily for rate control.  Chads 2 vasc equals 4.  Ongoing oral  anticoagulation recommended, patient has been on long-term warfarin user.    Subjective/ROS:    -augmented by discussion with facility staff involved in direct care      Patient reports that she is feeling well.  Her pelvic fracture (left superior and left inferior pubic rami fractures, left sacral sierra) were managed conservatively with pain control and therapy..  She reports good pain control and likes the addition of the Voltaren.  She is also on Tylenol as needed and oxycodone 2.5 every 6 as needed.  She is able to sleep.  She said a week ago she thought she never be able to even get out of bed and is impressed at how much things have improved since then.    She reports that her cardiac symptoms were probably going on 3 to 4 weeks before this syncopal event.  She was having lightheaded spells and just felt off.    Prior to this patient reports that she has been well excepting her high blood pressure which was formally treated with enalapril Dyazide verapamil and now treated with enalapril and metoprolol.    I do find significant edema in her lower extremities today which she thinks is relatively new.  This may be due to decreased activity with her legs hanging down in the wheelchair but also due to loss of her Dyazide which was discontinued during the hospitalization.    Patient otherwise says she is not having headaches change in vision speaking swallowing chest pain cough fever sweats chills orthopnea PND wheezing abdominal pain nausea vomiting.  Her loose stools have resolved.  There is no melena or bright red blood per rectum.  No dysuria.  Remainder 13 system ROS negative    Past Medical History:   Diagnosis Date   ? Atrial fibrillation with RVR (H)    ? CKD (chronic kidney disease) stage 3, GFR 30-59 ml/min    ? Disorder of ligament of foot 11/28/2016   ? Essential hypertension 11/28/2016   ? Family history of colon cancer     Declines having colonoscopy   ? Glaucoma    ? History of pulmonary embolus (PE)  2016    Factor V Leiden mutation, family history pulmonary embolus   ? LBBB (left bundle branch block)    ? Osteoarthritis of multiple joints 2016   ? Osteopenia     DXA 2018 T score -1.3, previous DXA normal    ? Peripheral edema 01/15/2018   ? Polyarthritis rheumatica (H)    ? Tachy-kayleen syndrome (H)    ? Vitamin D deficiency      Past Surgical History:   Procedure Laterality Date   ? NH DISCISSION,2ND CATARACT,LASER Left 2/3/2016    Procedure: LASER YAG CAPSULOTOMY, LEFT;  Surgeon: Jitendra Rick MD;  Location: [facility];  Service: Ophthalmology   ? NH DISCISSION,2ND CATARACT,LASER Right 2016    Procedure: LASER YAG CAPSULOTOMY, RIGHT;  Surgeon: Jitendra Rick MD;  Location: [facility];  Service: Ophthalmology          Family History   Problem Relation Age of Onset   ? Pulmonary embolism Mother          at age 51   ? Heart attack Father          in his 70s   ? Colon cancer Brother            ? Kidney failure Sister    ? Prostate cancer Son    ? Hodgkin's lymphoma Son    :       Social History     Socioeconomic History   ? Marital status:      Spouse name: Not on file   ? Number of children: Not on file   ? Years of education: Not on file   ? Highest education level: Not on file   Occupational History   ? Not on file   Social Needs   ? Financial resource strain: Not on file   ? Food insecurity     Worry: Not on file     Inability: Not on file   ? Transportation needs     Medical: Not on file     Non-medical: Not on file   Tobacco Use   ? Smoking status: Never Smoker   ? Smokeless tobacco: Never Used   Substance and Sexual Activity   ? Alcohol use: Yes   ? Drug use: Not on file   ? Sexual activity: Not on file   Lifestyle   ? Physical activity     Days per week: Not on file     Minutes per session: Not on file   ? Stress: Not on file   Relationships   ? Social connections     Talks on phone: Not on file     Gets together: Not on file     Attends Religion  service: Not on file     Active member of club or organization: Not on file     Attends meetings of clubs or organizations: Not on file     Relationship status: Not on file   ? Intimate partner violence     Fear of current or ex partner: Not on file     Emotionally abused: Not on file     Physically abused: Not on file     Forced sexual activity: Not on file   Other Topics Concern   ? Not on file   Social History Narrative     2001  [place]    6 sons and multiple grandchildren, also has great grandchildren.   :        Current Outpatient Medications on File Prior to Visit   Medication Sig Dispense Refill   ? acetaminophen (TYLENOL) 325 MG tablet Take 325 mg by mouth every 4 (four) hours as needed for pain.     ? diclofenac sodium (VOLTAREN) 1 % Gel Apply topically 3 (three) times a day.     ? enalapril (VASOTEC) 10 MG tablet TAKE 1 TABLET(10 MG) BY MOUTH TWICE DAILY 180 tablet 3   ? latanoprost (XALATAN) 0.005 % ophthalmic solution Administer 1 drop to both eyes at bedtime.      ? loperamide (IMODIUM A-D) 2 mg tablet Take 2 mg by mouth 3 (three) times a day as needed for diarrhea.     ? metoprolol succinate (TOPROL-XL) 100 MG 24 hr tablet TAKE ONE TABLET BY MOUTH ONCE DAILY 90 tablet 3   ? oxyCODONE (ROXICODONE) 5 MG immediate release tablet Take 2.5 mg by mouth every 6 (six) hours as needed for pain.     ? warfarin ANTICOAGULANT (COUMADIN/JANTOVEN) 2 MG tablet TAKE ONE-HALF TO ONE TABLET (1-2MG) ONCE DAILY AS DIRECTED ADJUST PER INR RESULTS (Patient taking differently: Hold till 10/4/20. Next INR 10/5/20) 90 tablet 1     No current facility-administered medications on file prior to visit.    :      ALLERGIES:  Patient has no known allergies.    Vitals:  There were no vitals taken for this visit. except as noted below    Vital signs:  Most Recent Vitals Date/Time Taken  Temperature: 98.5  F 10/08/2020 03:49 PM  Pulse: 69 per minute 10/08/2020 03:49 PM  Respirations: 18 per minute 10/08/2020 03:49 PM  Blood  Pressure: 125 / 67 mmHg 10/08/2020 03:49 PM  O2 Saturation: 100 % 10/08/2020 03:49 PM  Weight: 172 lbs / Routine       BMI: 29.52 10/07/2020 02:54 PM  Height: 5ft 4.0in 09/29/2020 10:50 PM  INR (International Normalized Ratio): 1.6 10/07/2020 10:35 AM  Physical exam:    Patient is alert oriented pleasant and appears younger than her stated age.  Normocephalic/atraumatic gaze is conjugate sclera clear demonstrates good range of motion of her neck with head rotation demonstrates purposeful motion of all 4 extremities.  Flexion of both hips causes some pain more on the left side anteriorly in the pelvic region.  She has 2+ pitting edema to the upper shin level.  Minimally tender.  No venous stasis dermatitis.  Her left chest pacemaker wound is healing nicely.  Her heart is regular S1-S2 with a soft early systolic murmur in the left parasternal area.  Her lungs are clear and she was recently.  Due to the 2020 Covid 19 pandemic, except as noted above, the patient was visually observed at a 6 foot plus distance.  An observational exam was performed in an effort to keep patient safe from Covid 19 and other communicable diseases.   Labs:  Lab Results   Component Value Date    WBC 8.0 06/09/2020    HGB 12.8 06/09/2020    HCT 37.8 06/09/2020    MCV 87 06/09/2020     06/09/2020     Results for orders placed or performed in visit on 06/05/19   Basic Metabolic Panel   Result Value Ref Range    Sodium 141 136 - 145 mmol/L    Potassium 4.5 3.5 - 5.0 mmol/L    Chloride 104 98 - 107 mmol/L    CO2 31 22 - 31 mmol/L    Anion Gap, Calculation 6 5 - 18 mmol/L    Glucose 81 70 - 125 mg/dL    Calcium 9.6 8.5 - 10.5 mg/dL    BUN 32 (H) 8 - 28 mg/dL    Creatinine 1.49 (H) 0.60 - 1.10 mg/dL    GFR MDRD Af Amer 40 (L) >60 mL/min/1.73m2    GFR MDRD Non Af Amer 33 (L) >60 mL/min/1.73m2         No results found for: TSH  No results found for: HGBA1C  [unfilled]  No results found for: FNJWKYBD06  No results found for: BNP  [unfilled]         Invalid input(s): PRINTERVAL ECHO COMPLETE WO CONTRAST2020  From [facility]  Component Name Value Ref Range   EJECTION FRACTION 55%     Other Result Information   This result has an attachment that is not available.   Result Narrative      [facility] [address] [address]   [phone] [email or web address]                                                 Transthoracic Echo Report   ANDRÉS MCKEON ID: [id number] Age: 85 : 10/05/1934 Ordering Provider: DOREEN COHEN   Exam Date: 2020 12:09 Gender: F Sonographer: [facility]   Accession #: [id number] Height: 66 in BSA: 1.88 m  BP: 126 / 50   Weight: 172 lbs BMI: 27.8 kg/m  HR: 68     Location: Inpatient (Portable) Rhythm: Irregular   Procedure Components: 2D imaging, Color Doppler, Spectral Doppler   Indications: Paroxysmal atrial fibrillation   Technical Quality: Fair Contrast: None     Final Conclusion   1. Normal left ventricular chamber size. Normal left ventricular wall thickness. Abnormal   ventricular septal motion due to abnormal   conduction.  Ventricular septal flattening in systole and diastole consistent with right   ventricular pressure and volume overload.   Borderline normal left ventricular systolic function. Estimated left ventricular ejection   fraction is 55%.   2. Normal right ventricular chamber size. Normal right ventricular systolic function.   Estimated right ventricular systolic pressure is 57   mmHg plus right atrial pressure.   4.  Mild tricuspid valve regurgitation.     There were no prior studies available for comparison.     EXAM: CT PELVIS WO  LOCATION: [facility] MEDICAL IMAGING  DATE/TIME: 2020 10:06 AM    INDICATION: Pelvic fracture, followup, pelvic pain.  COMPARISON: 20 radiographs.  TECHNIQUE: CT scan of the pelvis was performed without IV contrast. Multiplanar  reformats were obtained. Dose reduction  techniques were used.  CONTRAST: None.    FINDINGS:  Comminuted mildly displaced fracture of the medial portion of the left superior  pubic ramus with extension to the left side of the body of the symphysis pubis.  Acute mildly displaced fractures of the left inferior pubic ramus and left  sacral ala. No evidence of a femoral fracture. Moderate degenerative changes in  both hips.    There is a moderate-sized acute hematoma along the left pelvic sidewall as seen  on series 3 image 52 and series 6 image 65, as well as some localized  intramuscular hematoma adjacent to the superior pubic ramus fracture.    IMPRESSION:  1.  Comminuted mildly displaced fracture of the left superior pubic ramus  extending into the body of the symphysis pubis with adjacent pelvic wall  hematoma.    2.  Acute mildly displaced fractures of the left inferior pubic ramus and left  sacral ala.  Result Narrative   EXAM: CT CHEST PE STUDY  LOCATION: [facility] MEDICAL IMAGING  DATE/TIME: 9/20/2020 2:40 PM    INDICATION: Dizziness. Fall, pain. History of pulmonary embolism. PE suspected,  high pretest prob.  COMPARISON: None available.  TECHNIQUE: CT chest pulmonary angiogram during arterial phase injection of IV  contrast. Multiplanar reformats and MIP reconstructions were performed. Dose  reduction techniques were used.   CONTRAST: IOHEXOL 350 MG IODINE/ML  ML BOTTLE: 75mL    FINDINGS:  ANGIOGRAM CHEST: Pulmonary arteries are normal caliber and negative for  pulmonary emboli. Thoracic aorta is negative for dissection.     LUNGS AND PLEURA: Calcified granulomas right lung. Minimal linear atelectasis or  scarring left base. No focal consolidation. No pleural effusion or pneumothorax.    MEDIASTINUM/AXILLAE: No adenopathy. No pericardial effusion. Coronary artery  stent. Mild atherosclerotic calcifications.    UPPER ABDOMEN: Cholelithiasis.    MUSCULOSKELETAL: Moderate degenerative changes both shoulders. Bony  demineralization. Mild scattered  degenerative changes in the spine. No acute  osseous abnormality.    IMPRESSION:  Negative for pulmonary embolism.       Result Narrative   EXAM: XR HIP 2 OR 3 VIEWS W PELVIS LEFT  LOCATION: [facility] MEDICAL IMAGING  DATE/TIME: 9/20/2020 12:46 PM    INDICATION: Fall, injury. Left hip pain.  COMPARISON: None.    IMPRESSION: Acute nondisplaced fractures of the mid left superior and inferior  pubic rami. Bony demineralization. Moderate degenerative changes both hips. No  hip fracture.       Assessment/Plan:      ICD-10-CM    1. Long term current use of anticoagulant therapy  Z79.01    2. Essential hypertension  I10    3. Tachy-kayleen syndrome (H)  I49.5    4. History of pulmonary embolus (PE)  Z86.711        Fall with injury  Cardiac syncope  Tachybradycardia syndrome  Atrial fibrillation with RVR new onset  AV anibal ablation September 28  Permanent pacemaker placement September 25, 2020  Chads 2 Vasc equals 4   Patient is doing quite well at this point.  Heart rate and blood pressure have been stable.  She has been able to participate in therapy and is improving daily.  -PT OT  all involved    Superior pubic ramus fracture left  Inferior pubic ramus fracture left  Left sacral sierra fracture   Topical Voltaren just added and patient is of the opinion that it helps.  Also has PRN Tylenol and oxycodone however she says that her pain is diminishing day by day and that it is not affecting her sleep or her ability to participate in PT.  No new changes made today.    Presumed osteoporosis   Patient's last DEXA scan was 2018 at which point she was diagnosed with osteopenia:   ==================================================================  RE: Ginna Quiroga  YOB: 1934           Dear Dawson Cloud,     Patient Profile:  83 y.o. female, postmenopausal, is here for the first bone density test at this site.  History of fractures - None. Family history of osteoporosis - None.  Family history of  hip fracture: None. Smoking history - No. Osteoporosis treatment past -  No. Osteoporosis treatment current - No.  Chronic medical problems - Height loss. High risk medications -  Blood thinner (Coumadin or Heparin);  Yes, Currently.     Assessment:     1. The spine bone density is best assessed using L1-L2 with T-score of 0.1.  2. Femoral bone densities show left femoral neck T- score of -1.3, and right total hip T-score of -0.9.  3. Vertebral fracture assessment did not show any vertebral compression fractures.  Study appeared adequate for assessment from L4-T6.  4 The trabecular bone score suggests good microarchitecture.     83 y.o. female with LOW BONE DENSITY (OSTEOPENIA) and MODERATE predicted fracture risk with a TBS adjusted 10 year major osteoporotic fracture risk of 11.8 % and hip fracture risk of 3.4 %.  This prediction tool is strongly influenced by age.     Recommendations:  Ensure adequate calcium, vitamin D intake, and regular weightbearing exercise with a recheck in 2-3 years to monitor for stability.        Bone densitometry was performed on your patient using our SA Ignite densitometer. The results are summarized and a copy of the actual scans are included for your review. In conformity with the International Society of Clinical Densitometry's most recent position statement for DXA interpretation (2015), the diagnosis will be made on the lowest measured T-score of the lumbar spine, femoral neck, total proximal femur or 33% radius. Note the change in terminology for diagnostic classification from OSTEOPENIA to LOW BONE MASS. All trending for sequential exams will be done using multiple vertebrae or the total proximal femur. Fracture risk is based on the WHO Fracture Risk Assessment Tool (FRAX). If additional information is needed or if you would like to discuss the results, please do not hesitate to call me.         Thank you for referring this patient to [facility]opor[facility]. We  are happy to be of service in support of you and your practice. If you have any questions or suggestions to improve our service, please call me at [phone].      Sincerely,      JOSSE Calderon.  Osteoporosis Services, [facility]    ==================================================================    -Anticipate follow-up with Dr. Saldaña following discharge here, to update DEXA and determine treatment strategy, questionably bisphosphonates?      Peripheral edema   Symmetric nontender but patient says it is not a typical finding for her.  May be due to decreased activity, sitting in chair with her legs hanging down but also to loss of Dyazide which was discontinued in the hospital.  - Lymphedema therapy  - Lymphedema garments of therapist choice  -Elevate legs above heart level when resting  - Consider going back to diuretic depending on need but would be nice to minimize medications of course    Hypertension   Both Dyazide and verapamil were discontinued, she is instead on enalapril 10 mg twice daily which is a continuation of Toprol- mg daily.  Blood pressures are looking good overall here.  No additional changes made today    CKD 3   Noted, avoid nephrotoxins, ACE inhibitor is in place    Factor V Leiden  History of PE  Long-term warfarin anticoagulation   INR subtherapeutic 1.7, new orders were placed by other provider.  Will need ongoing monitoring    Glaucoma   Latanoprost continues    Osteoarthritis knees   See notes by Ms. Hall, see x-ray results consistent with osteoarthritis.  Spoke with physical therapist today who says that the knee pain is really what is limiting her in therapy more than the pelvic fractures now.  Patient agrees with that.  She has had injections in the past both coxcomb and steroid.  She is interested in injections.  Ms. Hall will be providing them in the foreseeable future  Case discussed with:  Primary CNP   Facility staff         Reji Sotelo  MD [place]

## 2021-06-20 NOTE — LETTER
"Letter by Lyubov Hall CNP at      Author: Lyubov Hall CNP Service: -- Author Type: --    Filed:  Encounter Date: 9/30/2020 Status: (Other)         Patient: Ginna Quiroga   MR Number: [id number]   YOB: 1934   Date of Visit: 9/30/2020     [facility]    Facility:   [facility] [[id number]]   Code Status: FULL CODE and POLST AVAILABLE      CHIEF COMPLAINT/REASON FOR VISIT:  Chief Complaint   Patient presents with   ? Review Of Multiple Medical Conditions     TCU intake-tachybradycardia syndrome, fall, pelvis fracture, advanced care planning       HISTORY:      HPI: Ginna is a 86 y.o. female who  has a past medical history of Atrial fibrillation with RVR (H), CKD (chronic kidney disease) stage 3, GFR 30-59 ml/min, Disorder of ligament of foot (11/28/2016), Essential hypertension (11/28/2016), Family history of colon cancer, Glaucoma, History of pulmonary embolus (PE) (11/28/2016), LBBB (left bundle branch block), Osteoarthritis of multiple joints (11/28/2016), Osteopenia, Peripheral edema (01/15/2018), Polyarthritis rheumatica (H), Tachy-kayleen syndrome (H), and Vitamin D deficiency.  1 Cristal was recently admitted to [facility] from September 20, 2020 and was discharged on September 29, 2020.  She was admitted for tachy-kayleen syndrome and a pacemaker was placed.  The discharging provider summarized the hospitalization as follows:     \"Ginna Quiroga is a 85-year-old female with pertinent past medical history of hypertension, factor V Leiden with history of PE (on indefinite warfarin), and CKD stage III who was admitted on 9/20/2020 after a fall, concerning for syncope/near syncope, suffered a pelvic fracture, found to have new onset atrial fibrillation with RVR (wide complex tachycardia due to LBBB) with underlying sinus bradycardia. Underwent a dual chamber PPM on 9/25/2020, rates still difficult to control, now s/p AV anibal ablation on 9/28/2020. " "Patient doing well. Medications adjusted per cardiology. She is discharged to TCU 9/29/2020 for ongoing rehab.\"    Today when he is being evaluated for an intake into the TCU. Ginna shares she is somewhat out of sorts. It has been a whirlwind of admission with the fall, pelvic fracture and pacemaker insertion. Ginna states she has been doing well considering all of this. She does admit to having loose stools, stating she has had 3-4 per day for the last day or so. She also reports that pain is not well controlled. She was using oxycodone in the hospital but that order did not follow her. Ginna's INR is 4.9 today. She denies any bleeding issues or bruising. Therapies have not yet started, but she is looking forward to getting stronger and going home, where she lives with her son. Ginna does not have any acute concerns or issues to report. Ginna denies any other concerns including fevers/chills, cough or cold symptoms, headaches, vision changes, chest pain/pressure, difficulty breathing, SOB, abdominal pain, nausea, vomiting, diarrhea, dysuria, increasing weakness, increasing pain.     Advanced Care Planning  Spoke with Ginan regarding code status and advanced care planning. Ginna consented to discussion and is aware of possible copay. They are also aware of the necessity of this discussion due to TCU admission. Discussed that she would like to have full resuscitation efforts. she would also like to have treatment if she  were to fall ill. she would like full medical treatment for all medical issues. Her sons Clem and Luke would decide for her if she  was unable to make medical decisions. she agrees to IV/IM antibiotics. she is ok with a feeding tube. There are no Scient[employer] obligations she  would like documented at this time. She does agree to organ donation.     Past Medical History:   Diagnosis Date   ? Atrial fibrillation with RVR (H)    ? CKD (chronic kidney disease) stage 3, GFR 30-59 ml/min    ? " Disorder of ligament of foot 2016   ? Essential hypertension 2016   ? Family history of colon cancer     Declines having colonoscopy   ? Glaucoma    ? History of pulmonary embolus (PE) 2016    Factor V Leiden mutation, family history pulmonary embolus   ? LBBB (left bundle branch block)    ? Osteoarthritis of multiple joints 2016   ? Osteopenia     DXA 2018 T score -1.3, previous DXA normal    ? Peripheral edema 01/15/2018   ? Polyarthritis rheumatica (H)    ? Tachy-kayleen syndrome (H)    ? Vitamin D deficiency              Family History   Problem Relation Age of Onset   ? Pulmonary embolism Mother          at age 51   ? Heart attack Father          in his 70s   ? Colon cancer Brother            ? Kidney failure Sister    ? Prostate cancer Son    ? Hodgkin's lymphoma Son      Social History     Socioeconomic History   ? Marital status:      Spouse name: Not on file   ? Number of children: Not on file   ? Years of education: Not on file   ? Highest education level: Not on file   Occupational History   ? Not on file   Social Needs   ? Financial resource strain: Not on file   ? Food insecurity     Worry: Not on file     Inability: Not on file   ? Transportation needs     Medical: Not on file     Non-medical: Not on file   Tobacco Use   ? Smoking status: Never Smoker   ? Smokeless tobacco: Never Used   Substance and Sexual Activity   ? Alcohol use: Yes   ? Drug use: Not on file   ? Sexual activity: Not on file   Lifestyle   ? Physical activity     Days per week: Not on file     Minutes per session: Not on file   ? Stress: Not on file   Relationships   ? Social connections     Talks on phone: Not on file     Gets together: Not on file     Attends [employer] service: Not on file     Active member of club or organization: Not on file     Attends meetings of clubs or organizations: Not on file     Relationship status: Not on file   ? Intimate partner violence     Fear  of current or ex partner: Not on file     Emotionally abused: Not on file     Physically abused: Not on file     Forced sexual activity: Not on file   Other Topics Concern   ? Not on file   Social History Narrative     2001  [place]    6 sons and multiple grandchildren, also has great grandchildren.       REVIEW OF SYSTEM:  Per HPI    PHYSICAL EXAM:   /61   Pulse 73   Temp 98.7  F (37.1  C)   Resp 18   Wt 142 lb 3.2 oz (64.5 kg)   SpO2 96%   BMI 24.03 kg/m      A limited exam was performed due to recommendations for care during COVID-19 pandemic. Due to the 2020 COVID-19 pandemic, except as noted above, the patient was visually observed at a 6 foot plus distance.  An observational exam was performed in an effort to keep patient safe from COVID-19 and other communicable diseases.     General appearance: alert, appears stated age and cooperative  HEENT: Head is normocephalic with normal hair distribution. No evidence of trauma. Ears: Without lesions or deformity. No acute purulent discharge. Eyes: Conjunctivae pink with no scleral icterus or erythema. Nose: Normal. Oropharnyx: mmm.  Lungs: respirations without effort.  Extremities: extremities normal, atraumatic, no cyanosis.  Skin: Skin color, texture normal. No rashes or lesions on exposed skin.   Neurologic: Grossly normal   Psych: interacts well with caregivers, exhibits logical thought processes and connections, pleasant.      LABS:   None today.     ASSESSMENT:      ICD-10-CM    1. Physical deconditioning  R53.81    2. Tachy-kayleen syndrome (H)  I49.5    3. Status post biventricular cardiac pacemaker insertion  Z95.0    4. Multiple closed fractures of pelvis without disruption of pelvic ring with routine healing  S32.82XD    5. Advance care planning  Z71.89    6. Functional diarrhea  K59.1    7. Anticoagulation management encounter  Z51.81     Z79.01        PLAN:    Physical Deconditioning  -Continue PT/OT and other therapies as per care  plan.  -Encouraged good nutrition and movement habits.   -Discussed care plan and expected course of stay.   -Continue to follow-up per routine schedule or sooner if needed.     Tachy kayleen Syndrome, s/p Pacemaker Insertions  -To follow with cardiology.   -Metoprolol 100 mg by mouth daily.   -Vasotec 10 mg by mouth two times a day.     Pelvis Fractures  -Tylenol 325 mg by mouth every 4 hours as needed.   -Oxycodone 2.5 mg by mouth every 6 hours as needed.     Diarrhea  -Imodium 2 mg by mouth three times a day as needed.     Anticoagulation Management  -Hold coumadin.   -INR on Friday.     Advanced Care Planning  -POLST reviewed and signed.   -Full Code.     Admission history and physical per MD in the next 30 days. At this time continue current care plan for all chronic medical conditions, as they are stable. Encouraged patient to engage in PT/OT for strengthening and conditioning. Encouraged patient to work closely with nursing staff to ensure any medical complaints are quickly addressed. Follow up this week or sooner if needed. Will continue to monitor patient and work with nursing staff collaboratively to work toward positive patient outcomes.    Total unit/floor time of 40 minutes time consisted of the following: time spent with patient, examination of patient, reviewing the record including pertinent labs and completing documentation. More than 50% of this time was spent in coordination of care time with nursing staff and other healthcare providers, this time was spent on discussion/counseling on current care plan including medical management of chronic health problems and acute health problems, education pertaining to plan, and discussion of the goals of care pertaining to the current outlined plan with nursing staff and patient. An additional 16 minutes of time was spent discussing code status, wishes for end of life care and reviewing POLST from 1005 to 1021. POLST signed and left with nursing staff.      Electronically signed by: Lyubov Hall, JEANETTE

## 2021-06-20 NOTE — LETTER
Letter by Lyubov Hall CNP at      Author: Lyubov Hall CNP Service: -- Author Type: --    Filed:  Encounter Date: 10/2/2020 Status: (Other)         Patient: Ginna Quiroga   MR Number: [id number]   YOB: 1934   Date of Visit: 10/2/2020     [facility]    Facility:   [facility] [[id number]]   Code Status: FULL CODE and POLST AVAILABLE      CHIEF COMPLAINT/REASON FOR VISIT:  Chief Complaint   Patient presents with   ? Review Of Multiple Medical Conditions     physical deconditioning, tachy-kayleen syndrome, s/p pacemaker insertion, fall, fractures       HISTORY:      HPI: Ginna is a 86 y.o. female who  has a past medical history of Atrial fibrillation with RVR (H), CKD (chronic kidney disease) stage 3, GFR 30-59 ml/min, Disorder of ligament of foot (11/28/2016), Essential hypertension (11/28/2016), Family history of colon cancer, Glaucoma, History of pulmonary embolus (PE) (11/28/2016), LBBB (left bundle branch block), Osteoarthritis of multiple joints (11/28/2016), Osteopenia, Peripheral edema (01/15/2018), Polyarthritis rheumatica (H), Tachy-kayleen syndrome (H), and Vitamin D deficiency.  1 Cristal was recently admitted to [facility] from September 20, 2020 and was discharged on September 29, 2020.  She was admitted for tachy-kayleen syndrome and a pacemaker was placed.  The discharging provider summarized the hospitalization in previous notes.     Today when he is being evaluated for a routine review of multiple medical problems while in the TCU. Ginna shares she has been doing well. She does not have any acute concerns. She is starting to settle in. No more diarrhea. She has been working with therapies, states they make her tired and that after sessions she is a bit fatigued-- however she will rest and feels fine after a few hours. She has not had any palpitations, no lightheadedness, no tachycardia. Ginna has not had much pain. She is feeling overall  good. She has been eating, drinking and eliminating well. Ginna denies any other concerns including fevers/chills, cough or cold symptoms, headaches, vision changes, chest pain/pressure, difficulty breathing, SOB, abdominal pain, nausea, vomiting, diarrhea, dysuria, increasing weakness, increasing pain.     Past Medical History:   Diagnosis Date   ? Atrial fibrillation with RVR (H)    ? CKD (chronic kidney disease) stage 3, GFR 30-59 ml/min    ? Disorder of ligament of foot 2016   ? Essential hypertension 2016   ? Family history of colon cancer     Declines having colonoscopy   ? Glaucoma    ? History of pulmonary embolus (PE) 2016    Factor V Leiden mutation, family history pulmonary embolus   ? LBBB (left bundle branch block)    ? Osteoarthritis of multiple joints 2016   ? Osteopenia     DXA 2018 T score -1.3, previous DXA normal    ? Peripheral edema 01/15/2018   ? Polyarthritis rheumatica (H)    ? Tachy-kayleen syndrome (H)    ? Vitamin D deficiency              Family History   Problem Relation Age of Onset   ? Pulmonary embolism Mother          at age 51   ? Heart attack Father          in his 70s   ? Colon cancer Brother            ? Kidney failure Sister    ? Prostate cancer Son    ? Hodgkin's lymphoma Son      Social History     Socioeconomic History   ? Marital status:      Spouse name: Not on file   ? Number of children: Not on file   ? Years of education: Not on file   ? Highest education level: Not on file   Occupational History   ? Not on file   Social Needs   ? Financial resource strain: Not on file   ? Food insecurity     Worry: Not on file     Inability: Not on file   ? Transportation needs     Medical: Not on file     Non-medical: Not on file   Tobacco Use   ? Smoking status: Never Smoker   ? Smokeless tobacco: Never Used   Substance and Sexual Activity   ? Alcohol use: Yes   ? Drug use: Not on file   ? Sexual activity: Not on file    Lifestyle   ? Physical activity     Days per week: Not on file     Minutes per session: Not on file   ? Stress: Not on file   Relationships   ? Social connections     Talks on phone: Not on file     Gets together: Not on file     Attends [employer] service: Not on file     Active member of club or organization: Not on file     Attends meetings of clubs or organizations: Not on file     Relationship status: Not on file   ? Intimate partner violence     Fear of current or ex partner: Not on file     Emotionally abused: Not on file     Physically abused: Not on file     Forced sexual activity: Not on file   Other Topics Concern   ? Not on file   Social History Narrative     2001  [employer]    6 sons and multiple grandchildren, also has great grandchildren.       REVIEW OF SYSTEM:  Per HPI    PHYSICAL EXAM:   /65   Pulse 70   Temp 97.7  F (36.5  C)   Resp 18   SpO2 99%     A limited exam was performed due to recommendations for care during COVID-19 pandemic. Due to the 2020 COVID-19 pandemic, except as noted above, the patient was visually observed at a 6 foot plus distance.  An observational exam was performed in an effort to keep patient safe from COVID-19 and other communicable diseases.     General appearance: alert, appears stated age and cooperative  HEENT: Head is normocephalic with normal hair distribution. No evidence of trauma. Ears: Without lesions or deformity. No acute purulent discharge. Eyes: Conjunctivae pink with no scleral icterus or erythema. Nose: Normal. Oropharnyx: mmm.  Lungs: respirations without effort.  Extremities: extremities normal, atraumatic, no cyanosis.  Skin: Skin color, texture normal. No rashes or lesions on exposed skin.   Neurologic: Grossly normal   Psych: interacts well with caregivers, exhibits logical thought processes and connections, pleasant.      LABS:   None today.     ASSESSMENT:      ICD-10-CM    1. Physical deconditioning  R53.81    2. Status post  biventricular cardiac pacemaker insertion  Z95.0    3. Tachy-kayleen syndrome (H)  I49.5    4. Multiple closed fractures of pelvis without disruption of pelvic ring with routine healing  S32.82XD        PLAN:    Care plan reviewed and remains appropriate.     Physical Deconditioning  -Continue PT/OT and other therapies as per care plan.  -Encouraged good nutrition and movement habits.   -Discussed care plan and expected course of stay.   -Continue to follow-up per routine schedule or sooner if needed.     Tachy kayleen Syndrome, s/p Pacemaker Insertions  -To follow with cardiology.   -Metoprolol 100 mg by mouth daily.   -Vasotec 10 mg by mouth two times a day.     Pelvis Fractures  -Tylenol 325 mg by mouth every 4 hours as needed.   -Oxycodone 2.5 mg by mouth every 6 hours as needed.     Anticoagulation Management  -Hold coumadin.   -INR on Monday.     Admission history and physical per MD in the next 30 days. At this time continue current care plan for all chronic medical conditions, as they are stable. Encouraged patient to engage in PT/OT for strengthening and conditioning. Encouraged patient to work closely with nursing staff to ensure any medical complaints are quickly addressed. Follow up this week or sooner if needed. Will continue to monitor patient and work with nursing staff collaboratively to work toward positive patient outcomes.    Electronically signed by: Lyubov Hall CNP

## 2021-06-20 NOTE — LETTER
Letter by Lyubov Hall CNP at      Author: Lyubov Hall CNP Service: -- Author Type: --    Filed:  Encounter Date: 10/5/2020 Status: (Other)         Patient: Ginna Quiroga   MR Number: [id number]   YOB: 1934   Date of Visit: 10/5/2020     [facility]    Facility:   [facility] [[id number]]   Code Status: FULL CODE and POLST AVAILABLE      CHIEF COMPLAINT/REASON FOR VISIT:  Chief Complaint   Patient presents with   ? Review Of Multiple Medical Conditions     physical deconditioning, tachy-kayleen syndrome, s/p pacemaker, fall, pelvis fx       HISTORY:      HPI: Ginna is a 86 y.o. female who  has a past medical history of Atrial fibrillation with RVR (H), CKD (chronic kidney disease) stage 3, GFR 30-59 ml/min, Disorder of ligament of foot (11/28/2016), Essential hypertension (11/28/2016), Family history of colon cancer, Glaucoma, History of pulmonary embolus (PE) (11/28/2016), LBBB (left bundle branch block), Osteoarthritis of multiple joints (11/28/2016), Osteopenia, Peripheral edema (01/15/2018), Polyarthritis rheumatica (H), Tachy-kayleen syndrome (H), and Vitamin D deficiency.  1 Cristal was recently admitted to [facility] from September 20, 2020 and was discharged on September 29, 2020.  She was admitted for tachy-kayleen syndrome and a pacemaker was placed.  The discharging provider summarized the hospitalization in previous notes.     Today when he is being evaluated for a routine review of multiple medical problems while in the TCU. Ginna shares she is doing well, she is having some fatigue but contributes this to working out with therapies. Ginna has no new concerns or problems. She is very happy because it is her birthday. Her family has set up a plan to have a parade outside of her window and also to send her several balloons, shay, etc. Ginna states that this is made her very happy and determined in therapies.  She states that this is the  reason why she is working so hard to get home.  She has no problems with eating or drinking.  She states that she is aluminating without an issue.  Ginna denies any other concerns including fevers/chills, cough or cold symptoms, headaches, vision changes, chest pain/pressure, difficulty breathing, SOB, abdominal pain, nausea, vomiting, diarrhea, dysuria, increasing weakness, increasing pain.     Past Medical History:   Diagnosis Date   ? Atrial fibrillation with RVR (H)    ? CKD (chronic kidney disease) stage 3, GFR 30-59 ml/min    ? Disorder of ligament of foot 2016   ? Essential hypertension 2016   ? Family history of colon cancer     Declines having colonoscopy   ? Glaucoma    ? History of pulmonary embolus (PE) 2016    Factor V Leiden mutation, family history pulmonary embolus   ? LBBB (left bundle branch block)    ? Osteoarthritis of multiple joints 2016   ? Osteopenia     DXA 2018 T score -1.3, previous DXA normal    ? Peripheral edema 01/15/2018   ? Polyarthritis rheumatica (H)    ? Tachy-kayleen syndrome (H)    ? Vitamin D deficiency              Family History   Problem Relation Age of Onset   ? Pulmonary embolism Mother          at age 51   ? Heart attack Father          in his 70s   ? Colon cancer Brother            ? Kidney failure Sister    ? Prostate cancer Son    ? Hodgkin's lymphoma Son      Social History     Socioeconomic History   ? Marital status:      Spouse name: Not on file   ? Number of children: Not on file   ? Years of education: Not on file   ? Highest education level: Not on file   Occupational History   ? Not on file   Social Needs   ? Financial resource strain: Not on file   ? Food insecurity     Worry: Not on file     Inability: Not on file   ? Transportation needs     Medical: Not on file     Non-medical: Not on file   Tobacco Use   ? Smoking status: Never Smoker   ? Smokeless tobacco: Never Used   Substance and Sexual Activity    ? Alcohol use: Yes   ? Drug use: Not on file   ? Sexual activity: Not on file   Lifestyle   ? Physical activity     Days per week: Not on file     Minutes per session: Not on file   ? Stress: Not on file   Relationships   ? Social connections     Talks on phone: Not on file     Gets together: Not on file     Attends Spiritism service: Not on file     Active member of club or organization: Not on file     Attends meetings of clubs or organizations: Not on file     Relationship status: Not on file   ? Intimate partner violence     Fear of current or ex partner: Not on file     Emotionally abused: Not on file     Physically abused: Not on file     Forced sexual activity: Not on file   Other Topics Concern   ? Not on file   Social History Narrative     2001  Rod    6 sons and multiple grandchildren, also has great grandchildren.       REVIEW OF SYSTEM:  Per HPI    PHYSICAL EXAM:   /79   Pulse 70   Temp 97.7  F (36.5  C)   Resp 17   Wt 172 lb (78 kg)   SpO2 97%   BMI 29.07 kg/m      A limited exam was performed due to recommendations for care during COVID-19 pandemic. Due to the 2020 COVID-19 pandemic, except as noted above, the patient was visually observed at a 6 foot plus distance.  An observational exam was performed in an effort to keep patient safe from COVID-19 and other communicable diseases.     General appearance: alert, appears stated age and cooperative  HEENT: Head is normocephalic with normal hair distribution. No evidence of trauma. Ears: Without lesions or deformity. No acute purulent discharge. Eyes: Conjunctivae pink with no scleral icterus or erythema. Nose: Normal. Oropharnyx: mmm.  Lungs: respirations without effort.  Extremities: extremities normal, atraumatic, no cyanosis.  Skin: Skin color, texture normal. No rashes or lesions on exposed skin.   Neurologic: Grossly normal   Psych: interacts well with caregivers, exhibits logical thought processes and connections,  pleasant.      LABS:   None today.     ASSESSMENT:      ICD-10-CM    1. Physical deconditioning  R53.81    2. Status post biventricular cardiac pacemaker insertion  Z95.0    3. Tachy-kayleen syndrome (H)  I49.5    4. Multiple closed fractures of pelvis without disruption of pelvic ring with routine healing  S32.82XD        PLAN:    Care plan reviewed and remains appropriate. Therapies to continue.     Physical Deconditioning  -Continue PT/OT and other therapies as per care plan.  -Encouraged good nutrition and movement habits.   -Discussed care plan and expected course of stay.   -Continue to follow-up per routine schedule or sooner if needed.     Tachy kayleen Syndrome, s/p Pacemaker Insertions  -To follow with cardiology.   -Metoprolol 100 mg by mouth daily.   -Vasotec 10 mg by mouth two times a day.     Pelvis Fractures  -Tylenol 325 mg by mouth every 4 hours as needed.   -Oxycodone 2.5 mg by mouth every 6 hours as needed.     Anticoagulation Management  -Coumadin dosed.  -INR now low.     Admission history and physical per MD in the next 30 days. At this time continue current care plan for all chronic medical conditions, as they are stable. Encouraged patient to engage in PT/OT for strengthening and conditioning. Encouraged patient to work closely with nursing staff to ensure any medical complaints are quickly addressed. Follow up this week or sooner if needed. Will continue to monitor patient and work with nursing staff collaboratively to work toward positive patient outcomes.    Electronically signed by: Lyubov Hall CNP

## 2021-06-21 NOTE — LETTER
Letter by Lyubov Hall CNP at      Author: Lyubov Hall CNP Service: -- Author Type: --    Filed:  Encounter Date: 10/26/2020 Status: (Other)         Patient: Ginna Quiroga   MR Number: [id number]   YOB: 1934   Date of Visit: 10/26/2020     [facility]    Facility:   [facility] [[id number]]   Code Status: FULL CODE and POLST AVAILABLE      CHIEF COMPLAINT/REASON FOR VISIT:  Chief Complaint   Patient presents with   ? Review Of Multiple Medical Conditions     physical deconditioning, tachy-kayleen syndrome, pacemaker insertion, fall, pelvic fx       HISTORY:      HPI: Ginna is a 86 y.o. female who  has a past medical history of Atrial fibrillation with RVR (H), CKD (chronic kidney disease) stage 3, GFR 30-59 ml/min, Disorder of ligament of foot (11/28/2016), Essential hypertension (11/28/2016), Family history of colon cancer, Glaucoma, History of pulmonary embolus (PE) (11/28/2016), LBBB (left bundle branch block), Osteoarthritis of multiple joints (11/28/2016), Osteopenia, Peripheral edema (01/15/2018), Polyarthritis rheumatica (H), Tachy-kayleen syndrome (H), and Vitamin D deficiency.  1 Cristal was recently admitted to [facility] from September 20, 2020 and was discharged on September 29, 2020.  She was admitted for tachy-kayleen syndrome and a pacemaker was placed.  The discharging provider summarized the hospitalization in previous notes.     Today Ginna is being evaluated for a routine review of multiple medical problems while in the transitional care unit.  Ginna shares she continues to do well. She has continued to progress therapies. Ginna has been participating in activities in the facility. No new concerns or issues to report. She has been eating, drinking and eliminating well. Discharge is pending and should be towards the end of this week. Ginna denies any other concerns including fevers/chills, cough or cold symptoms, headaches, vision  changes, chest pain/pressure, difficulty breathing, SOB, abdominal pain, nausea, vomiting, diarrhea, dysuria, increasing weakness, increasing pain.     Past Medical History:   Diagnosis Date   ? Atrial fibrillation with RVR (H)    ? CKD (chronic kidney disease) stage 3, GFR 30-59 ml/min    ? Disorder of ligament of foot 2016   ? Essential hypertension 2016   ? Family history of colon cancer     Declines having colonoscopy   ? Glaucoma    ? History of pulmonary embolus (PE) 2016    Factor V Leiden mutation, family history pulmonary embolus   ? LBBB (left bundle branch block)    ? Osteoarthritis of multiple joints 2016   ? Osteopenia     DXA 2018 T score -1.3, previous DXA normal    ? Peripheral edema 01/15/2018   ? Polyarthritis rheumatica (H)    ? Tachy-kayleen syndrome (H)    ? Vitamin D deficiency              Family History   Problem Relation Age of Onset   ? Pulmonary embolism Mother          at age 51   ? Heart attack Father          in his 70s   ? Colon cancer Brother            ? Kidney failure Sister    ? Prostate cancer Son    ? Hodgkin's lymphoma Son      Social History     Socioeconomic History   ? Marital status:      Spouse name: Not on file   ? Number of children: Not on file   ? Years of education: Not on file   ? Highest education level: Not on file   Occupational History   ? Not on file   Social Needs   ? Financial resource strain: Not on file   ? Food insecurity     Worry: Not on file     Inability: Not on file   ? Transportation needs     Medical: Not on file     Non-medical: Not on file   Tobacco Use   ? Smoking status: Never Smoker   ? Smokeless tobacco: Never Used   Substance and Sexual Activity   ? Alcohol use: Yes   ? Drug use: Not on file   ? Sexual activity: Not on file   Lifestyle   ? Physical activity     Days per week: Not on file     Minutes per session: Not on file   ? Stress: Not on file   Relationships   ? Social connections      Talks on phone: Not on file     Gets together: Not on file     Attends [employer] service: Not on file     Active member of club or organization: Not on file     Attends meetings of clubs or organizations: Not on file     Relationship status: Not on file   ? Intimate partner violence     Fear of current or ex partner: Not on file     Emotionally abused: Not on file     Physically abused: Not on file     Forced sexual activity: Not on file   Other Topics Concern   ? Not on file   Social History Narrative     2001  Rod    6 sons and multiple grandchildren, also has great grandchildren.       REVIEW OF SYSTEM:  Per HPI    PHYSICAL EXAM:   /80   Pulse 70   Temp 98.2  F (36.8  C)   Resp 18   Wt 169 lb 12.8 oz (77 kg)   SpO2 97%   BMI 28.70 kg/m      A limited exam was performed due to recommendations for care during COVID-19 pandemic. Due to the 2020 COVID-19 pandemic, except as noted above, the patient was visually observed at a 6 foot plus distance.  An observational exam was performed in an effort to keep patient safe from COVID-19 and other communicable diseases.     General appearance: alert, appears stated age and cooperative  HEENT: Head is normocephalic with normal hair distribution. No evidence of trauma. Ears: Without lesions or deformity. No acute purulent discharge. Eyes: Conjunctivae pink with no scleral icterus or erythema. Nose: Normal. Oropharnyx: mmm.  Lungs: respirations without effort.  Extremities: extremities normal, atraumatic, no cyanosis.  Skin: Skin color, texture normal. No rashes or lesions on exposed skin.   Neurologic: Grossly normal   Psych: interacts well with caregivers, exhibits logical thought processes and connections, pleasant.      LABS:   None today.     ASSESSMENT:      ICD-10-CM    1. Status post biventricular cardiac pacemaker insertion  Z95.0    2. Tachy-kayleen syndrome (H)  I49.5    3. Physical deconditioning  R53.81    4. Multiple closed fractures of  pelvis without disruption of pelvic ring with routine healing  S32.82XD        PLAN:    Discharge planning has begun and it expected discharge later this week.     Physical Deconditioning  -Continue PT/OT and other therapies as per care plan.  -Encouraged good nutrition and movement habits.   -Discussed care plan and expected course of stay.   -Continue to follow-up per routine schedule or sooner if needed.     Tachy kayleen Syndrome, s/p Pacemaker Insertions  -To follow with cardiology.   -Metoprolol 100 mg by mouth daily.   -Vasotec 10 mg by mouth two times a day.     Pelvis Fractures  -Tylenol 325 mg by mouth every 4 hours as needed.   -Oxycodone 2.5 mg by mouth every 6 hours as needed.     Otherwise continue current care plan for all other chronic medical conditions, as they are stable. Encouraged patient to engage in healthy lifestyle behaviors such as engaging in social activities, exercising (PT/OT), eating well, and following care plan. Follow up for routine check-up, or sooner if needed. Will continue to monitor patient and work with nursing staff collaboratively to work toward positive patient outcomes.    Electronically signed by: Lyubov Hall CNP

## 2021-06-21 NOTE — LETTER
Letter by Dawson Cloud MD at      Author: Dawson Cloud MD Service: -- Author Type: --    Filed:  Encounter Date: 1/20/2021 Status: (Other)         Ginna Quiroga  [address] [place] [address]             January 20, 2021         Dear Ginna,    Below are the results from your recent visit:    Resulted Orders   DXA Bone Density Scan    Narrative    1/13/2021      RE: Ginna Quiroga  YOB: 1934        Dear Dawson Cloud,    Patient Profile:  86 y.o. female, postmenopausal, is here for the follow up bone density   test.   History of fractures - None. Family history of osteoporosis - None.    Family history of hip fracture: None. Smoking history - No. Osteoporosis   treatment past -  No. Osteoporosis treatment current - No.  Chronic   medical problems - None. High risk medications -  Blood thinner (Coumadin   or Heparin);  Yes, Currently.    Assessment:    1. The spine bone density is best assessed using L1-L2 with T-score of   0.2.  2. Femoral bone densities show left total hip T- score of -1.2, and right   total hip T-score of -1.1.  3.  Bone density was also checked in the wrist as an alternate site since   the lower spine area was influenced by degenerative change.  In the left   33% radius, bone density is in the normal range T score of 0.4.  4.  The trabecular bone score reflects moderate to good micro   architecture.  5.  Since the scan in 2018, bone density has declined a significant 3.8%   in the left total hip and 2.5% in the right total hip.  At L1-L2 of the AP   spine bone density has remained essentially stable.    86 y.o. female with LOW BONE DENSITY (OSTEOPENIA) and MODERATE predicted   hip fracture risk with a TBS adjusted 10-year major osteoporotic fracture   risk of 10.4% and hip fracture risk of 2.7%.  This prediction tool is   strongly influenced by age.    Recommendations:  Ensure adequate calcium, vitamin D intake, and regular weightbearing   exercise  with a recheck in 3 to 5 years.      Bone densitometry was performed on your patient using our [employer] iDXA   densitometer. The results are summarized and a copy of the actual scans   are included for your review. In conformity with the International Society   of Clinical Densitometry's most recent position statement for DXA   interpretation (2015), the diagnosis will be made on the lowest measured   T-score of the lumbar spine, femoral neck, total proximal femur or 33%   radius. Note the change in terminology for diagnostic classification from   OSTEOPENIA to LOW BONE MASS. All trending for sequential exams will be   done using multiple vertebrae or the total proximal femur. Fracture risk   is based on the WHO Fracture Risk Assessment Tool (FRAX). If additional   information is needed or if you would like to discuss the results, please   do not hesitate to call me.       Thank you for referring this patient to [facility].   We are happy to be of service in support of you and your practice. If you   have any questions or suggestions to improve our service, please call me   at [phone].     Sincerely,     Angel Campos M.D. C.C.D.  [facility], [facility]         The numbers on your DEXA do not look bad.  However, with the recent pelvic fracture, you should still take alendronate as we discussed.  This will serve as a new baseline when we recheck a DEXA in 2 years.    Please call with questions or contact us using [employer].    Sincerely,        Electronically signed by Dawson Cloud MD

## 2021-06-21 NOTE — LETTER
Letter by Dawson Cloud MD at      Author: Dawson Cloud MD Service: -- Author Type: --    Filed:  Encounter Date: 11/11/2020 Status: (Other)         Ginna Quiroga  [address] [place] [address]             November 11, 2020         Dear Ginna,    Below are the results from your recent visit:    Resulted Orders   Comprehensive Metabolic Panel   Result Value Ref Range    Sodium 143 136 - 145 mmol/L    Potassium 3.6 3.5 - 5.0 mmol/L    Chloride 102 98 - 107 mmol/L    CO2 30 22 - 31 mmol/L    Anion Gap, Calculation 11 5 - 18 mmol/L    Glucose 96 70 - 125 mg/dL    BUN 24 8 - 28 mg/dL    Creatinine 0.99 0.60 - 1.10 mg/dL    GFR MDRD Af Amer >60 >60 mL/min/1.73m2    GFR MDRD Non Af Amer 53 (L) >60 mL/min/1.73m2    Bilirubin, Total 0.5 0.0 - 1.0 mg/dL    Calcium 9.5 8.5 - 10.5 mg/dL    Protein, Total 6.6 6.0 - 8.0 g/dL    Albumin 3.8 3.5 - 5.0 g/dL    Alkaline Phosphatase 132 (H) 45 - 120 U/L    AST 19 0 - 40 U/L    ALT 21 0 - 45 U/L    Narrative    Fasting Glucose reference range is 70-99 mg/dL per  American Diabetes Association (ADA) guidelines.   Magnesium   Result Value Ref Range    Magnesium 1.4 (L) 1.8 - 2.6 mg/dL   HM2(CBC w/o Differential)   Result Value Ref Range    WBC 6.7 4.0 - 11.0 thou/uL    RBC 4.07 3.80 - 5.40 mill/uL    Hemoglobin 11.8 (L) 12.0 - 16.0 g/dL    Hematocrit 36.1 35.0 - 47.0 %    MCV 89 80 - 100 fL    MCH 29.0 27.0 - 34.0 pg    MCHC 32.8 32.0 - 36.0 g/dL    RDW 14.8 (H) 11.0 - 14.5 %    Platelets 338 140 - 440 thou/uL    MPV 6.8 (L) 7.0 - 10.0 fL       Everything looks okay except your magnesium is low and this is related to the use of furosemide.  It is important to keep your magnesium within the normal range.  A low magnesium can cause electrical problems in the heart.    I would start taking 2 tablets of magnesium oxide 400 mg every day.  A total of 800 mg daily.  You can find this OTC.    Please call with questions or contact us using  MyChart.    Sincerely,        Electronically signed by Dawson Cloud MD

## 2021-06-21 NOTE — LETTER
Letter by Lyubov Hall CNP at      Author: Lyubov Hall CNP Service: -- Author Type: --    Filed:  Encounter Date: 10/19/2020 Status: (Other)         Patient: Ginna Quiroga   MR Number: [id number]   YOB: 1934   Date of Visit: 10/19/2020     [facility]    Facility:   [facility] [[id number]]   Code Status: FULL CODE and POLST AVAILABLE      CHIEF COMPLAINT/REASON FOR VISIT:  Chief Complaint   Patient presents with   ? Review Of Multiple Medical Conditions     Physical deconditioning, tachybradycardia syndrome, status post pacemaker insertion, fall, pelvic fracture   ? INR Check       HISTORY:      HPI: Ginna is a 86 y.o. female who  has a past medical history of Atrial fibrillation with RVR (H), CKD (chronic kidney disease) stage 3, GFR 30-59 ml/min, Disorder of ligament of foot (11/28/2016), Essential hypertension (11/28/2016), Family history of colon cancer, Glaucoma, History of pulmonary embolus (PE) (11/28/2016), LBBB (left bundle branch block), Osteoarthritis of multiple joints (11/28/2016), Osteopenia, Peripheral edema (01/15/2018), Polyarthritis rheumatica (H), Tachy-kayleen syndrome (H), and Vitamin D deficiency.  1 Cristal was recently admitted to [facility] from September 20, 2020 and was discharged on September 29, 2020.  She was admitted for tachy-kayleen syndrome and a pacemaker was placed.  The discharging provider summarized the hospitalization in previous notes.     Today Ginna is being evaluated for a routine review of multiple medical problems while in the transitional care unit.  Ye states that she has been doing quite well.  She has been going up and down stairs.  She feels very stable and strong with the therapies she has undergone.  She is looking forward to going home soon.  She does not have any significant pain.  She uses Tylenol when she has any breakthrough pain.  She states this is very rare however.  She has been eating,  drinking and eliminating well.  She also is being evaluated for anticoagulation management today.  She has an INR goal of 2-3, she is at goal today.  She denies any bleeding, bruising, dark black tarry stools.  Ginna denies any other concerns including fevers/chills, cough or cold symptoms, headaches, vision changes, chest pain/pressure, difficulty breathing, SOB, abdominal pain, nausea, vomiting, diarrhea, dysuria, increasing weakness, increasing pain.     Past Medical History:   Diagnosis Date   ? Atrial fibrillation with RVR (H)    ? CKD (chronic kidney disease) stage 3, GFR 30-59 ml/min    ? Disorder of ligament of foot 2016   ? Essential hypertension 2016   ? Family history of colon cancer     Declines having colonoscopy   ? Glaucoma    ? History of pulmonary embolus (PE) 2016    Factor V Leiden mutation, family history pulmonary embolus   ? LBBB (left bundle branch block)    ? Osteoarthritis of multiple joints 2016   ? Osteopenia     DXA 2018 T score -1.3, previous DXA normal    ? Peripheral edema 01/15/2018   ? Polyarthritis rheumatica (H)    ? Tachy-kayleen syndrome (H)    ? Vitamin D deficiency              Family History   Problem Relation Age of Onset   ? Pulmonary embolism Mother          at age 51   ? Heart attack Father          in his 70s   ? Colon cancer Brother            ? Kidney failure Sister    ? Prostate cancer Son    ? Hodgkin's lymphoma Son      Social History     Socioeconomic History   ? Marital status:      Spouse name: Not on file   ? Number of children: Not on file   ? Years of education: Not on file   ? Highest education level: Not on file   Occupational History   ? Not on file   Social Needs   ? Financial resource strain: Not on file   ? Food insecurity     Worry: Not on file     Inability: Not on file   ? Transportation needs     Medical: Not on file     Non-medical: Not on file   Tobacco Use   ? Smoking status: Never Smoker   ?  Smokeless tobacco: Never Used   Substance and Sexual Activity   ? Alcohol use: Yes   ? Drug use: Not on file   ? Sexual activity: Not on file   Lifestyle   ? Physical activity     Days per week: Not on file     Minutes per session: Not on file   ? Stress: Not on file   Relationships   ? Social connections     Talks on phone: Not on file     Gets together: Not on file     Attends Islam service: Not on file     Active member of club or organization: Not on file     Attends meetings of clubs or organizations: Not on file     Relationship status: Not on file   ? Intimate partner violence     Fear of current or ex partner: Not on file     Emotionally abused: Not on file     Physically abused: Not on file     Forced sexual activity: Not on file   Other Topics Concern   ? Not on file   Social History Narrative     2001  Rod    6 sons and multiple grandchildren, also has great grandchildren.       REVIEW OF SYSTEM:  Per HPI    PHYSICAL EXAM:   /88   Pulse 73   Temp 97.1  F (36.2  C)   Resp 18   Wt 171 lb 12.8 oz (77.9 kg)   SpO2 99%   BMI 29.03 kg/m      A limited exam was performed due to recommendations for care during COVID-19 pandemic. Due to the 2020 COVID-19 pandemic, except as noted above, the patient was visually observed at a 6 foot plus distance.  An observational exam was performed in an effort to keep patient safe from COVID-19 and other communicable diseases.     General appearance: alert, appears stated age and cooperative  HEENT: Head is normocephalic with normal hair distribution. No evidence of trauma. Ears: Without lesions or deformity. No acute purulent discharge. Eyes: Conjunctivae pink with no scleral icterus or erythema. Nose: Normal. Oropharnyx: mmm.  Lungs: respirations without effort.  Extremities: extremities normal, atraumatic, no cyanosis.  Skin: Skin color, texture normal. No rashes or lesions on exposed skin.   Neurologic: Grossly normal   Psych: interacts well  with caregivers, exhibits logical thought processes and connections, pleasant.      LABS:   None today.     ASSESSMENT:      ICD-10-CM    1. Physical deconditioning  R53.81    2. Status post biventricular cardiac pacemaker insertion  Z95.0    3. Tachy-kayleen syndrome (H)  I49.5    4. Multiple closed fractures of pelvis without disruption of pelvic ring with routine healing  S32.82XD    5. Long term current use of anticoagulant therapy  Z79.01    6. Anticoagulation management encounter  Z51.81     Z79.01        PLAN:    Care plan reviewed and remains appropriate.  Therapies to continue.    Physical Deconditioning  -Continue PT/OT and other therapies as per care plan.  -Encouraged good nutrition and movement habits.   -Discussed care plan and expected course of stay.   -Continue to follow-up per routine schedule or sooner if needed.     Tachy kayleen Syndrome, s/p Pacemaker Insertions  -To follow with cardiology.   -Metoprolol 100 mg by mouth daily.   -Vasotec 10 mg by mouth two times a day.     Pelvis Fractures  -Tylenol 325 mg by mouth every 4 hours as needed.   -Oxycodone 2.5 mg by mouth every 6 hours as needed.     Anticoagulation Management, long-term use of anticoagulants  -INR at goal. Current INR is 2.7.  -Recheck INR in one week.   -Follow up as needed or per routine--report any bleeding to provider team immediately.     Otherwise continue current care plan for all other chronic medical conditions, as they are stable. Encouraged patient to engage in healthy lifestyle behaviors such as engaging in social activities, exercising (PT/OT), eating well, and following care plan. Follow up for routine check-up, or sooner if needed. Will continue to monitor patient and work with nursing staff collaboratively to work toward positive patient outcomes.    Electronically signed by: Lyubov Hall CNP

## 2021-06-21 NOTE — LETTER
Letter by Lyubov Hall CNP at      Author: Lyubov Hall CNP Service: -- Author Type: --    Filed:  Encounter Date: 10/23/2020 Status: (Other)         Patient: Ginna Quiroga   MR Number: [id number]   YOB: 1934   Date of Visit: 10/23/2020     [facility]    Facility:   [facility] [[id number]]   Code Status: FULL CODE and POLST AVAILABLE      CHIEF COMPLAINT/REASON FOR VISIT:  Chief Complaint   Patient presents with   ? Review Of Multiple Medical Conditions     Physical deconditioning, tachybradycardia syndrome, status post pacemaker insertion, fall, pelvis fracture       HISTORY:      HPI: Ginna is a 86 y.o. female who  has a past medical history of Atrial fibrillation with RVR (H), CKD (chronic kidney disease) stage 3, GFR 30-59 ml/min, Disorder of ligament of foot (11/28/2016), Essential hypertension (11/28/2016), Family history of colon cancer, Glaucoma, History of pulmonary embolus (PE) (11/28/2016), LBBB (left bundle branch block), Osteoarthritis of multiple joints (11/28/2016), Osteopenia, Peripheral edema (01/15/2018), Polyarthritis rheumatica (H), Tachy-kayleen syndrome (H), and Vitamin D deficiency.  1 Cristal was recently admitted to [facility] from September 20, 2020 and was discharged on September 29, 2020.  She was admitted for tachy-kayleen syndrome and a pacemaker was placed.  The discharging provider summarized the hospitalization in previous notes.     Today Ginna is being evaluated for a routine review of multiple medical problems while in the transitional care unit.  She continues to report that she is doing quite well.  She has no new concerns or issues to report.  She does state that therapies are going very well.  She is very happy with her current level of accomplishment.  She has been able to walk up stairs.  She states that she has not been able to walk up and down stairs.  However she feels that that is acceptable.  She will  continue to work on stairs.  She is looking to be safe in her home.  She does not have any new aches or pains.  She has not had any pelvic pain since arrival.  She continues to eat, drink and eliminate well.  No other concerns per nursing staff.  Ginna denies any other concerns including fevers/chills, cough or cold symptoms, headaches, vision changes, chest pain/pressure, difficulty breathing, SOB, abdominal pain, nausea, vomiting, diarrhea, dysuria, increasing weakness, increasing pain.     Past Medical History:   Diagnosis Date   ? Atrial fibrillation with RVR (H)    ? CKD (chronic kidney disease) stage 3, GFR 30-59 ml/min    ? Disorder of ligament of foot 2016   ? Essential hypertension 2016   ? Family history of colon cancer     Declines having colonoscopy   ? Glaucoma    ? History of pulmonary embolus (PE) 2016    Factor V Leiden mutation, family history pulmonary embolus   ? LBBB (left bundle branch block)    ? Osteoarthritis of multiple joints 2016   ? Osteopenia     DXA 2018 T score -1.3, previous DXA normal    ? Peripheral edema 01/15/2018   ? Polyarthritis rheumatica (H)    ? Tachy-kayleen syndrome (H)    ? Vitamin D deficiency              Family History   Problem Relation Age of Onset   ? Pulmonary embolism Mother          at age 51   ? Heart attack Father          in his 70s   ? Colon cancer Brother            ? Kidney failure Sister    ? Prostate cancer Son    ? Hodgkin's lymphoma Son      Social History     Socioeconomic History   ? Marital status:      Spouse name: Not on file   ? Number of children: Not on file   ? Years of education: Not on file   ? Highest education level: Not on file   Occupational History   ? Not on file   Social Needs   ? Financial resource strain: Not on file   ? Food insecurity     Worry: Not on file     Inability: Not on file   ? Transportation needs     Medical: Not on file     Non-medical: Not on file   Tobacco Use    ? Smoking status: Never Smoker   ? Smokeless tobacco: Never Used   Substance and Sexual Activity   ? Alcohol use: Yes   ? Drug use: Not on file   ? Sexual activity: Not on file   Lifestyle   ? Physical activity     Days per week: Not on file     Minutes per session: Not on file   ? Stress: Not on file   Relationships   ? Social connections     Talks on phone: Not on file     Gets together: Not on file     Attends [employer]ist service: Not on file     Active member of club or organization: Not on file     Attends meetings of clubs or organizations: Not on file     Relationship status: Not on file   ? Intimate partner violence     Fear of current or ex partner: Not on file     Emotionally abused: Not on file     Physically abused: Not on file     Forced sexual activity: Not on file   Other Topics Concern   ? Not on file   Social History Narrative     2001  Rod    6 sons and multiple grandchildren, also has great grandchildren.       REVIEW OF SYSTEM:  Per HPI    PHYSICAL EXAM:   /79   Pulse 73   Temp 97.7  F (36.5  C)   Resp 17   Wt 169 lb 12.8 oz (77 kg)   SpO2 99%   BMI 28.70 kg/m      A limited exam was performed due to recommendations for care during COVID-19 pandemic. Due to the 2020 COVID-19 pandemic, except as noted above, the patient was visually observed at a 6 foot plus distance.  An observational exam was performed in an effort to keep patient safe from COVID-19 and other communicable diseases.     General appearance: alert, appears stated age and cooperative  HEENT: Head is normocephalic with normal hair distribution. No evidence of trauma. Ears: Without lesions or deformity. No acute purulent discharge. Eyes: Conjunctivae pink with no scleral icterus or erythema. Nose: Normal. Oropharnyx: mmm.  Lungs: respirations without effort.  Extremities: extremities normal, atraumatic, no cyanosis.  Skin: Skin color, texture normal. No rashes or lesions on exposed skin.   Neurologic:  Grossly normal   Psych: interacts well with caregivers, exhibits logical thought processes and connections, pleasant.      LABS:   None today.     ASSESSMENT:      ICD-10-CM    1. Physical deconditioning  R53.81    2. Status post biventricular cardiac pacemaker insertion  Z95.0    3. Tachy-kayleen syndrome (H)  I49.5    4. Multiple closed fractures of pelvis without disruption of pelvic ring with routine healing  S32.82XD        PLAN:    Patient continues to do quite well and is nearing baseline, is working on steps to ensure safety in the home.  Discharge planning to commence.    Physical Deconditioning  -Continue PT/OT and other therapies as per care plan.  -Encouraged good nutrition and movement habits.   -Discussed care plan and expected course of stay.   -Continue to follow-up per routine schedule or sooner if needed.     Tachy kayleen Syndrome, s/p Pacemaker Insertions  -To follow with cardiology.   -Metoprolol 100 mg by mouth daily.   -Vasotec 10 mg by mouth two times a day.     Pelvis Fractures  -Tylenol 325 mg by mouth every 4 hours as needed.   -Oxycodone 2.5 mg by mouth every 6 hours as needed.     Otherwise continue current care plan for all other chronic medical conditions, as they are stable. Encouraged patient to engage in healthy lifestyle behaviors such as engaging in social activities, exercising (PT/OT), eating well, and following care plan. Follow up for routine check-up, or sooner if needed. Will continue to monitor patient and work with nursing staff collaboratively to work toward positive patient outcomes.    Electronically signed by: Lyubov Hall CNP

## 2021-06-21 NOTE — LETTER
Letter by Lyubov Hall CNP at      Author: Lyubov Hall CNP Service: -- Author Type: --    Filed:  Encounter Date: 10/28/2020 Status: (Other)         Patient: Ginna Quiroga   MR Number: [id number]   YOB: 1934   Date of Visit: 10/28/2020     [facility]    Facility:   [facility] [[id number]]   Code Status: POLST AVAILABLE  PCP: Dawson Cloud MD   Phone: [phone]   Fax: [phone]      CHIEF COMPLAINT/REASON FOR VISIT:  Chief Complaint   Patient presents with   ? Discharge Summary       HISTORY COURSE:    Ginna is a 86 y.o. female who  has a past medical history of Atrial fibrillation with RVR (H), CKD (chronic kidney disease) stage 3, GFR 30-59 ml/min, Disorder of ligament of foot (11/28/2016), Essential hypertension (11/28/2016), Family history of colon cancer, Glaucoma, History of pulmonary embolus (PE) (11/28/2016), LBBB (left bundle branch block), Osteoarthritis of multiple joints (11/28/2016), Osteopenia, Peripheral edema (01/15/2018), Polyarthritis rheumatica (H), Tachy-kayleen syndrome (H), and Vitamin D deficiency.  Ginna was recently admitted to [facility] from September 20, 2020 and was discharged on September 29, 2020.  She was admitted for tachy-kayleen syndrome and a pacemaker was placed.  The discharging provider summarized the hospitalization in previous notes.     Today Ginna is being evaluated for a discharge from the TCU.  She had very few medication changes.  From the hospital she was weaned off of oxycodone due to nonuse and pain control without analgesia.  She was also started on Lasix as a diuretic.  She has been doing particularly well.  She has no pain.  She has been eating, drinking and eliminating well.  She has no new concerns or issues to report.  She is looking forward to going home.  She feels that she will be safe at home and does have resources to support her and she feels that her discharge will be  successful.  Ginna denies any other concerns including fevers/chills, cough or cold symptoms, headaches, vision changes, chest pain/pressure, difficulty breathing, SOB, abdominal pain, nausea, vomiting, diarrhea, dysuria, increasing weakness, increasing pain.     PHYSICAL EXAM:   /80   Pulse 70   Temp 98.2  F (36.8  C)   Resp 18   Wt 169 lb 12.8 oz (77 kg)   SpO2 97%   BMI 28.70 kg/m      A limited exam was performed due to recommendations for care during COVID-19 pandemic. Due to the 2020 COVID-19 pandemic, except as noted above, the patient was visually observed at a 6 foot plus distance.  An observational exam was performed in an effort to keep patient safe from COVID-19 and other communicable diseases.     General appearance: alert, appears stated age and cooperative  HEENT: Head is normocephalic with normal hair distribution. No evidence of trauma. Ears: Without lesions or deformity. No acute purulent discharge. Eyes: Conjunctivae pink with no scleral icterus or erythema. Nose: Normal. Oropharnyx: mmm.  Lungs: respirations without effort.  Extremities: extremities normal, atraumatic, no cyanosis.  Skin: Skin color, texture normal. No rashes or lesions on exposed skin.   Neurologic: Grossly normal   Psych: interacts well with caregivers, exhibits logical thought processes and connections, pleasant.    MEDICATION LIST:  Current Outpatient Medications   Medication Sig   ? acetaminophen (TYLENOL) 325 MG tablet Take 325 mg by mouth every 4 (four) hours as needed for pain.   ? diclofenac sodium (VOLTAREN) 1 % Gel Apply topically 3 (three) times a day.   ? enalapril (VASOTEC) 10 MG tablet TAKE 1 TABLET(10 MG) BY MOUTH TWICE DAILY   ? latanoprost (XALATAN) 0.005 % ophthalmic solution Administer 1 drop to both eyes at bedtime.    ? loperamide (IMODIUM A-D) 2 mg tablet Take 2 mg by mouth 3 (three) times a day as needed for diarrhea.   ? metoprolol succinate (TOPROL-XL) 100 MG 24 hr tablet TAKE ONE TABLET BY  MOUTH ONCE DAILY   ? oxyCODONE (ROXICODONE) 5 MG immediate release tablet Take 2.5 mg by mouth every 6 (six) hours as needed for pain.   ? warfarin sodium (WARFARIN ORAL) Take by mouth. 10/15/20 INR 3.1  Take 1.5mg daily.  Next INR 10/19/20.       DISCHARGE DIAGNOSIS:    ICD-10-CM    1. Physical deconditioning  R53.81    2. Multiple closed fractures of pelvis without disruption of pelvic ring with routine healing  S32.82XD    3. Status post biventricular cardiac pacemaker insertion  Z95.0    4. Tachy-kayleen syndrome (H)  I49.5      Physical Deconditioning  -Continue PT/OT and other therapies as per care plan.  -Encouraged good nutrition and movement habits.   -Discussed care plan and expected course of stay.   -Continue to follow-up per routine schedule or sooner if needed.     Tachy kayleen Syndrome, s/p Pacemaker Insertions  -To follow with cardiology.   -Metoprolol 100 mg by mouth daily.   -Vasotec 10 mg by mouth two times a day.     Pelvis Fractures  -Tylenol 325 mg by mouth every 4 hours as needed.   -Discontinue oxycodone due to nonuse.    Otherwise continue current care plan for all other chronic medical conditions, as they are stable. Encouraged patient to engage in healthy lifestyle behaviors such as engaging in social activities, exercising (PT/OT), eating well, and following care plan. Follow up for routine check-up, or sooner if needed. Will continue to monitor patient and work with nursing staff collaboratively to work toward positive patient outcomes.    MEDICAL EQUIPMENT NEEDS:  None.    DISCHARGE PLAN/FACE TO FACE:  I certify that services are/were furnished while this patient was under the care of a physician and that a physician or an allowed non-physician practitioner (NPP), had a face-to-face encounter that meets the physician face-to-face encounter requirements. The encounter was in whole, or in part, related to the primary reason for home health. The patient is confined to his/her home and needs  intermittent skilled nursing, physical therapy, speech-language pathology, or the continued need for occupational therapy. A plan of care has been established by a physician and is periodically reviewed by a physician.  Date of Face-to-Face Encounter: 10/28/2020    I certify that, based on my findings, the following services are medically necessary home health services: home health aid for bathing and ADLs, skilled nursing (RN) for medication set-up and teaching, symptoms and disease process monitoring and education, PT for strengthening, balance, endurance and safety within the home, OT for strengthening, ADL needs, adaptive equipment and safety.    My clinical findings support the need for the above skilled services because: home health aid for bathing and ADLs, skilled nursing (RN) for medication set-up and teaching, symptoms and disease process monitoring and education, PT for strengthening, balance, endurance and safety within the home, OT for strengthening, ADL needs, adaptive equipment and safety.    This patient is homebound because: he is a frail elderly gentleman with multiple comorbidities and recent hospitalizations making it unsafe for him to go out into the community for services.     The patient is, or has been, under my care and I have initiated the establishment of the plan of care. This patient will be followed by a physician who will periodically review the plan of care. Schedule follow up visit with primary care provider within 7 days to reestablish care.    Total unit/floor time of 35 minutes time spent on discharge planning, discharge follow-up discussion and discharge medication review.    Electronically signed by: Lyubov Hall CNP

## 2021-06-21 NOTE — LETTER
Letter by Lyubov Hall CNP at      Author: Lyubov Hall CNP Service: -- Author Type: --    Filed:  Encounter Date: 10/12/2020 Status: (Other)         Patient: Ginna Quiroga   MR Number: [id number]   YOB: 1934   Date of Visit: 10/12/2020     [facility]    Facility:   [facility] [[id number]]   Code Status: FULL CODE and POLST AVAILABLE      CHIEF COMPLAINT/REASON FOR VISIT:  Chief Complaint   Patient presents with   ? Review Of Multiple Medical Conditions     physical deconditioning, s/p pacemaker insertion, tachy/kayleen syndrome, fall, pelvis fracture, OA       HISTORY:      HPI: Ginna is a 86 y.o. female who  has a past medical history of Atrial fibrillation with RVR (H), CKD (chronic kidney disease) stage 3, GFR 30-59 ml/min, Disorder of ligament of foot (11/28/2016), Essential hypertension (11/28/2016), Family history of colon cancer, Glaucoma, History of pulmonary embolus (PE) (11/28/2016), LBBB (left bundle branch block), Osteoarthritis of multiple joints (11/28/2016), Osteopenia, Peripheral edema (01/15/2018), Polyarthritis rheumatica (H), Tachy-kayleen syndrome (H), and Vitamin D deficiency.  1 Cristal was recently admitted to [facility] from September 20, 2020 and was discharged on September 29, 2020.  She was admitted for tachy-kayleen syndrome and a pacemaker was placed.  The discharging provider summarized the hospitalization in previous notes.     Today Ginna has been doing quite well. She did have a bump in the road when her knees were causing her a great deal of discomfort. She had corticosteroid injections into her knees on Friday and states they are significantly better already. She has been able to participate in therapies and feels they are going well. Ginna has not had any cardiovascular complaints. States she has not had any pain. Pacemaker insertion site is clean and dry. She has no pain or aches that are new. She has some pelvic pain  at times. Ginna shares she has been eating, drinking and eliminating well. Ginna denies any other concerns including fevers/chills, cough or cold symptoms, headaches, vision changes, chest pain/pressure, difficulty breathing, SOB, abdominal pain, nausea, vomiting, diarrhea, dysuria, increasing weakness, increasing pain.     Past Medical History:   Diagnosis Date   ? Atrial fibrillation with RVR (H)    ? CKD (chronic kidney disease) stage 3, GFR 30-59 ml/min    ? Disorder of ligament of foot 2016   ? Essential hypertension 2016   ? Family history of colon cancer     Declines having colonoscopy   ? Glaucoma    ? History of pulmonary embolus (PE) 2016    Factor V Leiden mutation, family history pulmonary embolus   ? LBBB (left bundle branch block)    ? Osteoarthritis of multiple joints 2016   ? Osteopenia     DXA 2018 T score -1.3, previous DXA normal    ? Peripheral edema 01/15/2018   ? Polyarthritis rheumatica (H)    ? Tachy-kayleen syndrome (H)    ? Vitamin D deficiency              Family History   Problem Relation Age of Onset   ? Pulmonary embolism Mother          at age 51   ? Heart attack Father          in his 70s   ? Colon cancer Brother            ? Kidney failure Sister    ? Prostate cancer Son    ? Hodgkin's lymphoma Son      Social History     Socioeconomic History   ? Marital status:      Spouse name: Not on file   ? Number of children: Not on file   ? Years of education: Not on file   ? Highest education level: Not on file   Occupational History   ? Not on file   Social Needs   ? Financial resource strain: Not on file   ? Food insecurity     Worry: Not on file     Inability: Not on file   ? Transportation needs     Medical: Not on file     Non-medical: Not on file   Tobacco Use   ? Smoking status: Never Smoker   ? Smokeless tobacco: Never Used   Substance and Sexual Activity   ? Alcohol use: Yes   ? Drug use: Not on file   ? Sexual activity: Not  on file   Lifestyle   ? Physical activity     Days per week: Not on file     Minutes per session: Not on file   ? Stress: Not on file   Relationships   ? Social connections     Talks on phone: Not on file     Gets together: Not on file     Attends Protestant service: Not on file     Active member of club or organization: Not on file     Attends meetings of clubs or organizations: Not on file     Relationship status: Not on file   ? Intimate partner violence     Fear of current or ex partner: Not on file     Emotionally abused: Not on file     Physically abused: Not on file     Forced sexual activity: Not on file   Other Topics Concern   ? Not on file   Social History Narrative     2001  Rod    6 sons and multiple grandchildren, also has great grandchildren.       REVIEW OF SYSTEM:  Per HPI    PHYSICAL EXAM:   /72   Pulse 70   Temp 97.2  F (36.2  C)   Resp 18   Wt 172 lb 9.6 oz (78.3 kg)   SpO2 96%   BMI 29.17 kg/m      A limited exam was performed due to recommendations for care during COVID-19 pandemic. Due to the 2020 COVID-19 pandemic, except as noted above, the patient was visually observed at a 6 foot plus distance.  An observational exam was performed in an effort to keep patient safe from COVID-19 and other communicable diseases.     General appearance: alert, appears stated age and cooperative  HEENT: Head is normocephalic with normal hair distribution. No evidence of trauma. Ears: Without lesions or deformity. No acute purulent discharge. Eyes: Conjunctivae pink with no scleral icterus or erythema. Nose: Normal. Oropharnyx: mmm.  Lungs: respirations without effort.  Extremities: extremities normal, atraumatic, no cyanosis.  Skin: Skin color, texture normal. No rashes or lesions on exposed skin.   Neurologic: Grossly normal   Psych: interacts well with caregivers, exhibits logical thought processes and connections, pleasant.      LABS:   None today.     ASSESSMENT:      ICD-10-CM     1. Physical deconditioning  R53.81    2. Primary osteoarthritis involving multiple joints  M89.49    3. Multiple closed fractures of pelvis without disruption of pelvic ring with routine healing  S32.82XD    4. Tachy-kayleen syndrome (H)  I49.5    5. Status post biventricular cardiac pacemaker insertion  Z95.0        PLAN:    Patient continues to do well, therapies to continue.     Physical Deconditioning  -Continue PT/OT and other therapies as per care plan.  -Encouraged good nutrition and movement habits.   -Discussed care plan and expected course of stay.   -Continue to follow-up per routine schedule or sooner if needed.     Tachy kayleen Syndrome, s/p Pacemaker Insertions  -To follow with cardiology.   -Metoprolol 100 mg by mouth daily.   -Vasotec 10 mg by mouth two times a day.     Pelvis Fractures  -Tylenol 325 mg by mouth every 4 hours as needed.   -Oxycodone 2.5 mg by mouth every 6 hours as needed.     Osteoarthritis  -Significant improvement in pain and ability to participate in therapies.   -Follow up as needed.     Otherwise continue current care plan for all other chronic medical conditions, as they are stable. Encouraged patient to engage in healthy lifestyle behaviors such as engaging in social activities, exercising (PT/OT), eating well, and following care plan. Follow up for routine check-up, or sooner if needed. Will continue to monitor patient and work with nursing staff collaboratively to work toward positive patient outcomes.    Electronically signed by: Lyubov Hall CNP

## 2021-06-21 NOTE — LETTER
Letter by Lyubov Hall CNP at      Author: Lyubov Hall CNP Service: -- Author Type: --    Filed:  Encounter Date: 10/13/2020 Status: (Other)         Patient: Ginna Quiroga   MR Number: [id number]   YOB: 1934   Date of Visit: 10/13/2020     [facility]    Facility:   [facility] [[id number]]   Code Status: FULL CODE and POLST AVAILABLE      CHIEF COMPLAINT/REASON FOR VISIT:  Chief Complaint   Patient presents with   ? INR Check       HISTORY:      HPI: Ginna is a 86 y.o. female who  has a past medical history of Atrial fibrillation with RVR (H), CKD (chronic kidney disease) stage 3, GFR 30-59 ml/min, Disorder of ligament of foot (11/28/2016), Essential hypertension (11/28/2016), Family history of colon cancer, Glaucoma, History of pulmonary embolus (PE) (11/28/2016), LBBB (left bundle branch block), Osteoarthritis of multiple joints (11/28/2016), Osteopenia, Peripheral edema (01/15/2018), Polyarthritis rheumatica (H), Tachy-kayleen syndrome (H), and Vitamin D deficiency.  1 Cristal was recently admitted to [facility] from September 20, 2020 and was discharged on September 29, 2020.  She was admitted for tachy-kayleen syndrome and a pacemaker was placed.  The discharging provider summarized the hospitalization in previous notes.     Today Ginna is being evaluated for anticoagulation management. She has an INR goal of 2-3 for a history of PE and DVT. She has been unstable as of late-- likely due to diet change. She denies any uncontrolled bleeding, any active bleeding, any bruising, hematuria, epistaxis, blood in stools or black/dark/tarry stools. No other concerns noted.     Past Medical History:   Diagnosis Date   ? Atrial fibrillation with RVR (H)    ? CKD (chronic kidney disease) stage 3, GFR 30-59 ml/min    ? Disorder of ligament of foot 11/28/2016   ? Essential hypertension 11/28/2016   ? Family history of colon cancer     Declines having colonoscopy    ? Glaucoma    ? History of pulmonary embolus (PE) 2016    Factor V Leiden mutation, family history pulmonary embolus   ? LBBB (left bundle branch block)    ? Osteoarthritis of multiple joints 2016   ? Osteopenia     DXA 2018 T score -1.3, previous DXA normal    ? Peripheral edema 01/15/2018   ? Polyarthritis rheumatica (H)    ? Tachy-kayleen syndrome (H)    ? Vitamin D deficiency              Family History   Problem Relation Age of Onset   ? Pulmonary embolism Mother          at age 51   ? Heart attack Father          in his 70s   ? Colon cancer Brother            ? Kidney failure Sister    ? Prostate cancer Son    ? Hodgkin's lymphoma Son      Social History     Socioeconomic History   ? Marital status:      Spouse name: Not on file   ? Number of children: Not on file   ? Years of education: Not on file   ? Highest education level: Not on file   Occupational History   ? Not on file   Social Needs   ? Financial resource strain: Not on file   ? Food insecurity     Worry: Not on file     Inability: Not on file   ? Transportation needs     Medical: Not on file     Non-medical: Not on file   Tobacco Use   ? Smoking status: Never Smoker   ? Smokeless tobacco: Never Used   Substance and Sexual Activity   ? Alcohol use: Yes   ? Drug use: Not on file   ? Sexual activity: Not on file   Lifestyle   ? Physical activity     Days per week: Not on file     Minutes per session: Not on file   ? Stress: Not on file   Relationships   ? Social connections     Talks on phone: Not on file     Gets together: Not on file     Attends Yarsanism service: Not on file     Active member of club or organization: Not on file     Attends meetings of clubs or organizations: Not on file     Relationship status: Not on file   ? Intimate partner violence     Fear of current or ex partner: Not on file     Emotionally abused: Not on file     Physically abused: Not on file     Forced sexual activity: Not on  file   Other Topics Concern   ? Not on file   Social History Narrative     2001  Rod    6 sons and multiple grandchildren, also has great grandchildren.       REVIEW OF SYSTEM:  Per HPI    PHYSICAL EXAM:   /72   Pulse 70   Temp 97.2  F (36.2  C)   Resp 18   Wt 172 lb 9.6 oz (78.3 kg)   SpO2 96%   BMI 29.17 kg/m      A limited exam was performed due to recommendations for care during COVID-19 pandemic. Due to the 2020 COVID-19 pandemic, except as noted above, the patient was visually observed at a 6 foot plus distance.  An observational exam was performed in an effort to keep patient safe from COVID-19 and other communicable diseases.     General appearance: alert, appears stated age and cooperative  HEENT: Head is normocephalic with normal hair distribution. No evidence of trauma. Ears: Without lesions or deformity. No acute purulent discharge. Eyes: Conjunctivae pink with no scleral icterus or erythema. Nose: Normal. Oropharnyx: mmm.  Lungs: respirations without effort.  Extremities: extremities normal, atraumatic, no cyanosis.  Skin: Skin color, texture normal. No rashes or lesions on exposed skin.   Neurologic: Grossly normal   Psych: interacts well with caregivers, exhibits logical thought processes and connections, pleasant.      LABS:   None today.     ASSESSMENT:      ICD-10-CM    1. Anticoagulation management encounter  Z51.81     Z79.01    2. History of pulmonary embolus (PE)  Z86.711        PLAN:    Anticoagulation Management  -INR at goal. Current INR is 4.1.  -Hold INR and recheck on Friday.   -Follow up as needed or per routine--report any bleeding to provider team immediately.     Otherwise continue current care plan for all other chronic medical conditions, as they are stable. Encouraged patient to engage in healthy lifestyle behaviors such as engaging in social activities, exercising (PT/OT), eating well, and following care plan. Follow up for routine check-up, or sooner if  needed. Will continue to monitor patient and work with nursing staff collaboratively to work toward positive patient outcomes.    Electronically signed by: Lyubov Hall CNP

## 2021-06-21 NOTE — LETTER
Letter by Dawson Cloud MD at      Author: Dawson Cloud MD Service: -- Author Type: --    Filed:  Encounter Date: 3/19/2021 Status: (Other)         Ginna Quiroga  [address] [place] [address]             March 19, 2021         Dear Ginna,    Below are the results from your recent visit:    Resulted Orders   Basic Metabolic Panel   Result Value Ref Range    Sodium 145 136 - 145 mmol/L    Potassium 3.7 3.5 - 5.0 mmol/L    Chloride 103 98 - 107 mmol/L    CO2 32 (H) 22 - 31 mmol/L    Anion Gap, Calculation 10 5 - 18 mmol/L    Glucose 100 70 - 125 mg/dL    Calcium 9.0 8.5 - 10.5 mg/dL    BUN 29 (H) 8 - 28 mg/dL    Creatinine 1.08 0.60 - 1.10 mg/dL    GFR MDRD Af Amer 58 (L) >60 mL/min/1.73m2    GFR MDRD Non Af Amer 48 (L) >60 mL/min/1.73m2    Narrative    Fasting Glucose reference range is 70-99 mg/dL per  American Diabetes Association (ADA) guidelines.   Magnesium   Result Value Ref Range    Magnesium 1.9 1.8 - 2.6 mg/dL   HM2(CBC w/o Differential)   Result Value Ref Range    WBC 7.0 4.0 - 11.0 thou/uL    RBC 4.51 3.80 - 5.40 mill/uL    Hemoglobin 12.2 12.0 - 16.0 g/dL    Hematocrit 38.5 35.0 - 47.0 %    MCV 85 80 - 100 fL    MCH 27.1 27.0 - 34.0 pg    MCHC 31.7 (L) 32.0 - 36.0 g/dL    RDW 14.9 (H) 11.0 - 14.5 %    Platelets 229 140 - 440 thou/uL    MPV 9.0 7.0 - 10.0 fL       Kidney function is stable.  Magnesium is much better taking the increased supplements.  Please continue.  CBC is okay.  No anemia.    Please call with questions or contact us using [employer].    Sincerely,        Electronically signed by Dawson Cloud MD

## 2021-06-23 NOTE — TELEPHONE ENCOUNTER
ANTICOAGULATION  MANAGEMENT    Assessment     Today's INR result of 2.4 is Therapeutic (goal INR of 2.0-3.0)        Warfarin taken as previously instructed    No new diet changes affecting INR    No new medication/supplements affecting INR    Continues to tolerate warfarin with no reported s/s of bleeding or thromboembolism     Previous INR was Therapeutic    Plan:     Spoke with Ginna regarding INR result and instructed:     Warfarin Dosing Instructions:  Continue current warfarin dose    1 mg on Wed, Sat; 2 mg all other days      (0 % change)    Instructed patient to follow up no later than: 4 weeks, prefers to call to make appointment.    Education provided: importance of therapeutic range and target INR goal and significance of current INR result    Ginna verbalizes understanding and agrees to warfarin dosing plan.    Instructed to call the [facility] for any changes, questions or concerns. (#[phone])   ?   Ольга Forbes RN    Subjective/Objective:      Ginna Coleann, a 84 y.o. female is on warfarin.     Ginna reports:     Home warfarin dose: as updated on anticoagulation calendar per template     Missed doses: No     Medication changes:  No     S/S of bleeding or thromboembolism:  No     New Injury or illness:  No     Changes in diet or alcohol consumption:  No     Upcoming surgery, procedure or cardioversion:  No    Anticoagulation Episode Summary     Current INR goal:   2.0-3.0   TTR:   92.2 % (3.3 y)   Next INR check:   2/13/2019   INR from last check:   2.40 (1/16/2019)   Weekly max warfarin dose:      Target end date:      INR check location:      Preferred lab:      Send INR reminders to:   ANTICOAGULATION POOL C (DTN,VAD,CGR,GAV)    Indications    Long term current use of anticoagulant therapy [Z79.01]  DVT (deep venous thrombosis) (H) (Resolved) [I82.409]           Comments:            Anticoagulation Care Providers     Provider Role Specialty Phone number    Dawson Cloud MD  [facility] Internal Medicine [phone]

## 2021-06-23 NOTE — TELEPHONE ENCOUNTER
Refill Approved    Rx renewed per Medication Renewal Policy. Medication was last renewed on 1/15/18.    Debo Aguirre, [facility] Triage/Med Refill 1/15/2019     Requested Prescriptions   Pending Prescriptions Disp Refills     triamterene-hydrochlorothiazide (DYAZIDE) 37.5-25 mg per capsule [Pharmacy Med Name: TRIAMTERENE 37.5MG/ HCTZ 25MG CAPS] 180 capsule 0     Sig: TAKE TWO CAPSULES BY MOUTH EVERY DAY    Diuretics/Combination Diuretics Refill Protocol  Passed - 1/14/2019  2:51 PM       Passed - Visit with PCP or prescribing provider visit in past 12 months    Last office visit with prescriber/PCP: Visit date not found OR same dept: Visit date not found OR same specialty: Visit date not found  Last physical: 1/15/2018 Last MTM visit: Visit date not found   Next visit within 3 mo: Visit date not found  Next physical within 3 mo: Visit date not found  Prescriber OR PCP: Dawsno Cloud MD  Last diagnosis associated with med order: There are no diagnoses linked to this encounter.  If protocol passes may refill for 12 months if within 3 months of last provider visit (or a total of 15 months).            Passed - Serum Potassium in past 12 months     Lab Results   Component Value Date    Potassium 4.5 03/22/2018            Passed - Serum Sodium in past 12 months     Lab Results   Component Value Date    Sodium 143 03/22/2018            Passed - Blood pressure on file in past 12 months    BP Readings from Last 1 Encounters:   01/15/18 142/78            Passed - Serum Creatinine in past 12 months     Creatinine   Date Value Ref Range Status   03/22/2018 1.12 (H) 0.60 - 1.10 mg/dL Final

## 2021-06-23 NOTE — PROGRESS NOTES
Assessment and Plan:       1. Medicare annual wellness visit, subsequent  Immunizations are reviewed and recommending Shingrix.  Living will discussed.  Non-smoker.  Uses alcohol in moderation.  Regular exercise discussed.  She declines having a colonoscopy.  Breast exam completed and she will continue to get a mammogram annually.   Last DEXA was 2018 and this showed mild osteopenia with T score -1.3.  Dementia and depression screening completed.  She sees an ophthalmologist regularly and gets glaucoma screening.  Skin exam performed and recommending regular use of sunblock.  Will screen for diabetes with fasting glucose.     2. Essential hypertension  Blood pressure not at goal.  We discussed reducing sodium intake in her diet.  She should eliminate the potato chips.  Will increase enalapril to 10 mg twice daily and continue current doses of verapamil, Dyazide, and metoprolol.  - Comprehensive Metabolic Panel  - Lipid Cascade  - Urinalysis  - Hemoglobin    3. History of pulmonary embolus (PE)  Continue anticoagulation lifelong with warfarin and monitor INR today    4. Primary osteoarthritis involving multiple joints  No change in chronic pain involving multiple joints.      5. Osteopenia, unspecified location  DEXA 2018 with T score -1.3.  She will try to maintain adequate calcium and vitamin D in her diet and encouraging weightbearing exercise    6. Peripheral edema  Discussed restricting her sodium in her diet better.  If becomes more bothersome, her Dyazide could be changed to a stronger diuretic such as furosemide    7. Vitamin D deficiency  Very low vitamin D level last year of 13 now taking 5000 units daily.  Will recheck level  - Vitamin D, Total (25-Hydroxy)    8. Long term current use of anticoagulant therapy      9. Left foot pain  No injury.  Unsure if arthritis related or stress fracture or ligament injury but her symptoms are improving and no further intervention needed at this time      11.  Encounter for screening mammogram for malignant neoplasm of breast    - Mammo Screening Bilateral; Future    12.  Glaucoma-continue regular ophthalmology appointments.  Providing new eye doctor name, Dr. Angel        The patient's current medical problems were reviewed.    Over 25 minutes was spent addressing these chronic and new medical problems beyond time spent performing annual wellness visit with over 50% of the time spent counseling and coordination of care    I have had an Advance Directives discussion with the patient.  The following health maintenance schedule was reviewed with the patient and provided in printed form in the after visit summary:   Health Maintenance   Topic Date Due     ZOSTER VACCINES (2 of 3) 02/26/2009     FALL RISK ASSESSMENT  01/29/2020     DXA SCAN  03/22/2020     TD 18+ HE  01/01/2023     ADVANCE DIRECTIVES DISCUSSED WITH PATIENT  01/29/2024     PNEUMOCOCCAL POLYSACCHARIDE VACCINE AGE 65 AND OVER  Completed     INFLUENZA VACCINE RULE BASED  Completed     PNEUMOCOCCAL CONJUGATE VACCINE FOR ADULTS (PCV13 OR PREVNAR)  Completed        Subjective:   Chief Complaint: Ginna Quiroga is an 84 y.o. female here for an Annual Wellness visit.   HPI: In addition to her annual wellness visit, multiple chronic medical problems discussed.    She takes multiple medications for blood pressure including verapamil, metoprolol, Vasotec, and Dyazide.  She does not monitor her blood pressure regularly.  He tends not to watch her sodium very carefully.  She enjoys her potato chips.  Experiencing ongoing mild edema in both feet.    Feeling very stressed with her oldest son diagnosed with metastatic prostate cancer.    Recent left foot pain of unclear etiology.  No injury or trauma.  Pain seems to be subsiding.  She has arthritis involving multiple joints but the symptoms are stable.    History of pulmonary embolus on lifelong anticoagulation with warfarin.  No bleeding    Review of Systems:    Please  see above.  The rest of the review of systems are negative for all systems.    Patient Care Team:  Dawson Cloud MD as PCP - General (Internal Medicine)     Patient Active Problem List   Diagnosis     Long term current use of anticoagulant therapy     Essential hypertension     Osteoarthritis of multiple joints     Glaucoma     History of pulmonary embolus (PE)     Family history of colon cancer     Peripheral edema     Osteopenia     Vitamin D deficiency     Left foot pain     Past Medical History:   Diagnosis Date     Disorder of ligament of foot 2016     Essential hypertension 2016     Family history of colon cancer     Declines having colonoscopy     Glaucoma      History of pulmonary embolus (PE) 2016    Factor V Leiden mutation, family history pulmonary embolus     Osteoarthritis of multiple joints 2016     Osteopenia     DXA 2018 T score -1.3, previous DXA normal      Peripheral edema 1/15/2018     Polyarthritis rheumatica (H)      Vitamin D deficiency       Past Surgical History:   Procedure Laterality Date     AZ DISCISSION,2ND CATARACT,LASER Left 2/3/2016    Procedure: LASER YAG CAPSULOTOMY, LEFT;  Surgeon: Jitendra Rick MD;  Location: [facility];  Service: Ophthalmology     AZ DISCISSION,2ND CATARACT,LASER Right 2016    Procedure: LASER YAG CAPSULOTOMY, RIGHT;  Surgeon: Jitendra Rick MD;  Location: [facility];  Service: Ophthalmology      Family History   Problem Relation Age of Onset     Pulmonary embolism Mother          at age 51     Heart attack Father          in his 70s     Colon cancer Brother              Kidney failure Sister      Prostate cancer Son      Hodgkin's lymphoma Son       Social History     Socioeconomic History     Marital status:      Spouse name: Not on file     Number of children: Not on file     Years of education: Not on file     Highest education level: Not on file   Social Needs     Financial resource  "strain: Not on file     Food insecurity - worry: Not on file     Food insecurity - inability: Not on file     Transportation needs - medical: Not on file     Transportation needs - non-medical: Not on file   Occupational History     Not on file   Tobacco Use     Smoking status: Never Smoker     Smokeless tobacco: Never Used   Substance and Sexual Activity     Alcohol use: Yes     Drug use: Not on file     Sexual activity: Not on file   Other Topics Concern     Not on file   Social History Narrative     2001  [place]    6 sons and multiple grandchildren, also has great grandchildren.      Current Outpatient Medications   Medication Sig Dispense Refill     enalapril (VASOTEC) 10 MG tablet Take 1 tablet (10 mg total) by mouth 2 (two) times a day. 180 tablet 3     latanoprost (XALATAN) 0.005 % ophthalmic solution        metoprolol succinate (TOPROL-XL) 100 MG 24 hr tablet TAKE ONE TABLET BY MOUTH ONCE DAILY 90 tablet 0     triamterene-hydrochlorothiazide (DYAZIDE) 37.5-25 mg per capsule TAKE TWO CAPSULES BY MOUTH EVERY  capsule 0     verapamil (CALAN-SR) 240 MG CR tablet TAKE ONE TABLET BY MOUTH ONCE DAILY AT BEDTIME 90 tablet 0     warfarin (COUMADIN/JANTOVEN) 2 MG tablet TAKE ONE-HALF TO ONE TABLET (1-2MG) ONCE DAILY AS DIRECTED ADJUST PER INR RESULTS 80 tablet 1     No current facility-administered medications for this visit.       Objective:   Vital Signs:   Visit Vitals  /86 (Patient Site: Left Arm, Patient Position: Sitting, Cuff Size: Adult Large)   Pulse (!) 44   Ht 5' 4.5\" (1.638 m)   Wt 178 lb (80.7 kg)   SpO2 98%   BMI 30.08 kg/m             PHYSICAL EXAM  EYES: Eyelids, conjunctiva, and sclera were normal. Pupils were normal. Cornea, iris, and lens were normal bilaterally.  HEAD, EARS, NOSE, MOUTH, AND THROAT: Head and face were normal. Nose appearance was normal and there was no discharge. Oropharynx was normal.  NECK: Neck appearance was normal. There were no neck masses and the " thyroid was not enlarged and no nodules are felt.  No lymphadenopathy.  RESPIRATORY: Breathing pattern was normal and the chest moved symmetrically.  Percussion/auscultatory percussion was normal.  Lung sounds were normal and there were no rales or wheezes.  CARDIOVASCULAR: Heart rate and rhythm were normal.  S1 and S2 were normal and there were no extra sounds or murmurs. Peripheral pulses in arms and legs were normal.  Jugular venous pressure was normal.  There was no peripheral edema.  No carotid bruits.  BREAST: No palpable masses or tenderness.  No axillary nodes.  GASTROINTESTINAL: The abdomen was normal in contour.  Bowel sounds were present.   Palpation detected no tenderness, mass, or enlarged organs.   MUSCULOSKELETAL: Skeletal configuration was normal and muscle mass was normal for age. Joint appearance was overall normal.  LYMPHATIC: There were no enlarged nodes.  SKIN/HAIR/NAILS: Skin color was normal.  Hair and nails were normal.There were no skin lesions.  NEUROLOGIC: The patient was alert and oriented to person, place, time, and circumstance. Speech was normal. Cranial nerves were normal. Motor strength was normal for age. The patient was normally coordinated.  Sensation intact.  PSYCHIATRIC:  Mood and affect were normal and the patient had normal recent and remote memory. The patient's judgment and insight were normal.    Assessment Results 1/29/2019   Activities of Daily Living No help needed   Instrumental Activities of Daily Living No help needed   Get Up and Go Score Less than 12 seconds   Mini Cog Total Score 5   Some recent data might be hidden     A Mini-Cog score of 0-2 suggests the possibility of dementia, score of 3-5 suggests no dementia    Identified Health Risks:     She is at risk for lack of exercise and has been provided with information to increase physical activity for the benefit of her well-being.  Information on urinary incontinence and treatment options given to  patient.  Information regarding advance directives (living morrell), including where she can download the appropriate form, was provided to the patient via the AVS.

## 2021-06-23 NOTE — TELEPHONE ENCOUNTER
Anticoagulation Annual Referral Renewal Review    Ginna Quiroga's chart reviewed for annual renewal of referral to anticoagulation monitoring.        Criteria for anticoagulation nurse and/or pharmacist renewal met   Warfarin indication: PE Yes , DVT/PE with previous provider documentation patient to be on extended anticoagulation   Current with INR monitoring/compliant Yes Yes   Date of last office visit 1/15/18 Yes, had office visit within last year   Time in Therapeutic Range (TTR) 89.46 % Yes, TTR > 60%       Ginna Quiroga met all criteria for anticoagulation management program initiated renewal.  New INR standing orders and anticoagulation referral renewal placed.      Ольга Forbes, RN  3:32 PM

## 2021-06-24 NOTE — TELEPHONE ENCOUNTER
Refill Approved    Rx renewed per Medication Renewal Policy. Medication was last renewed on 12/7/18.    Feli Davis, [facility] Triage/Med Refill 3/6/2019     Requested Prescriptions   Pending Prescriptions Disp Refills     verapamil (CALAN-SR) 240 MG CR tablet [Pharmacy Med Name: VERAPAMIL ER 240MG TAB TABLET] 90 tablet      Sig: TAKE ONE TABLET BY MOUTH ONCE DAILY AT BEDTIME    Calcium-Channel Blockers Protocol Passed - 3/4/2019 11:49 AM       Passed - PCP or prescribing provider visit in past 12 months or next 3 months    Last office visit with prescriber/PCP: Visit date not found OR same dept: Visit date not found OR same specialty: Visit date not found  Last physical: 1/29/2019 Last MTM visit: Visit date not found   Next visit within 3 mo: Visit date not found  Next physical within 3 mo: Visit date not found  Prescriber OR PCP: Dawson Cloud MD  Last diagnosis associated with med order: There are no diagnoses linked to this encounter.  If protocol passes may refill for 12 months if within 3 months of last provider visit (or a total of 15 months).            Passed - Blood pressure filed in past 12 months    BP Readings from Last 1 Encounters:   01/29/19 156/86

## 2021-06-24 NOTE — TELEPHONE ENCOUNTER
Refill Approved    Rx renewed per Medication Renewal Policy. Medication was last renewed on 11/22/18.    Sakshi Zarate, [facility] Triage/Med Refill 2/20/2019     Requested Prescriptions   Pending Prescriptions Disp Refills     metoprolol succinate (TOPROL-XL) 100 MG 24 hr tablet [Pharmacy Med Name: METOPROLOL XL 100MG TAB TABLET] 90 tablet 0     Sig: TAKE ONE TABLET BY MOUTH ONCE DAILY    Beta-Blockers Refill Protocol Passed - 2/18/2019 10:39 AM       Passed - PCP or prescribing provider visit in past 12 months or next 3 months    Last office visit with prescriber/PCP: Visit date not found OR same dept: Visit date not found OR same specialty: Visit date not found  Last physical: 1/29/2019 Last MTM visit: Visit date not found   Next visit within 3 mo: Visit date not found  Next physical within 3 mo: Visit date not found  Prescriber OR PCP: Dawson Cloud MD  Last diagnosis associated with med order: 1. Hypertension  - metoprolol succinate (TOPROL-XL) 100 MG 24 hr tablet [Pharmacy Med Name: METOPROLOL XL 100MG TAB TABLET]; TAKE ONE TABLET BY MOUTH ONCE DAILY  Dispense: 90 tablet; Refill: 0    If protocol passes may refill for 12 months if within 3 months of last provider visit (or a total of 15 months).            Passed - Blood pressure filed in past 12 months    BP Readings from Last 1 Encounters:   01/29/19 156/86

## 2021-06-24 NOTE — TELEPHONE ENCOUNTER
ANTICOAGULATION  MANAGEMENT    Assessment     Today's INR result of 2.7 is Therapeutic (goal INR of 2.0-3.0)        Warfarin taken as previously instructed    No new diet changes affecting INR    No interaction expected between increased dose of enalapril and warfarin    Continues to tolerate warfarin with no reported s/s of bleeding or thromboembolism     Previous INR was Therapeutic    Plan:     Spoke with Ginna regarding INR result and instructed:     Warfarin Dosing Instructions:  Continue current warfarin dose    1 mg on Wed, Sat; 2 mg all other days     (0 % change)    Instructed patient to follow up no later than: 4-6 weeks - Patient prefers to call to make appointment    Education provided: importance of therapeutic range, target INR goal and significance of current INR result and no interaction anticipated between warfarin and enalapril    Ginna verbalizes understanding and agrees to warfarin dosing plan.    Instructed to call the [facility] for any changes, questions or concerns. (#[phone])   ?   Ольга Forbes RN    Subjective/Objective:      Ginna Quiroga, a 84 y.o. female is on warfarin.     Ginna reports:     Home warfarin dose: verbally confirmed home dose with Ginna and updated on anticoagulation calendar     Missed doses: No     Medication changes:  Yes- enalapril dose increased.     S/S of bleeding or thromboembolism:  No     New Injury or illness:  No     Changes in diet or alcohol consumption:  No     Upcoming surgery, procedure or cardioversion:  No    Anticoagulation Episode Summary     Current INR goal:   2.0-3.0   TTR:   92.4 % (3.4 y)   Next INR check:   4/4/2019   INR from last check:   2.70 (2/21/2019)   Weekly max warfarin dose:      Target end date:      INR check location:      Preferred lab:      Send INR reminders to:   ANTICOAGULATION POOL C (DTN,VAD,CGR,GAV)    Indications    Long term current use of anticoagulant therapy [Z79.01]  DVT (deep venous thrombosis)  (H) (Resolved) [I82.409]           Comments:            Anticoagulation Care Providers     Provider Role Specialty Phone number    Dawson Cloud MD Referring Internal Medicine [phone]

## 2021-06-25 NOTE — TELEPHONE ENCOUNTER
ANTICOAGULATION  MANAGEMENT    Assessment     Today's INR result of 2.1 is Therapeutic (goal INR of 2.0-3.0)        Warfarin taken as previously instructed    No new diet changes affecting INR    No new medication/supplements affecting INR    Continues to tolerate warfarin with no reported s/s of bleeding or thromboembolism     Previous INR was Therapeutic    Plan:     Left detailed message for Ginna regarding INR result and instructed:      Warfarin Dosing Instructions:  Continue current warfarin dose 1 mg daily on Mondays, Wednesdays and Fridays; and 2 mg daily rest of week  (0 % change)    Instructed patient to follow up no later than: 5 weeks.    Education provided:     Instructed to call the [facility] for any changes, questions or concerns. (#[phone])   ?   Gretta Maxwell RN    Subjective/Objective:      Ginna Quiroga, a 86 y.o. female is on warfarin. Ginna Chacko reports:     Home warfarin dose: as updated on anticoagulation calendar per template     Missed doses: No     Medication changes:  No     S/S of bleeding or thromboembolism:  No     New Injury or illness:  No     Changes in diet or alcohol consumption:  No     Upcoming surgery, procedure or cardioversion:  No    Anticoagulation Episode Summary     Current INR goal:  2.0-3.0   TTR:  80.9 % (1 y)   Next INR check:  7/1/2021   INR from last check:  2.10 (5/27/2021)   Weekly max warfarin dose:     Target end date:     INR check location:     Preferred lab:     Send INR reminders to:  [facility]    Indications    Long term current use of anticoagulant therapy [Z79.01]  DVT (deep venous thrombosis) (H) (Resolved) [I82.409]           Comments:           Anticoagulation Care Providers     Provider Role Specialty Phone number    Dawson Cloud MD Referring Internal Medicine [phone]

## 2021-07-06 ENCOUNTER — COMMUNICATION - HEALTHEAST (OUTPATIENT)
Dept: INTERNAL MEDICINE | Facility: CLINIC | Age: 86
End: 2021-07-06

## 2021-07-06 ENCOUNTER — COMMUNICATION - HEALTHEAST (OUTPATIENT)
Dept: ANTICOAGULATION | Facility: CLINIC | Age: 86
End: 2021-07-06

## 2021-07-06 ENCOUNTER — AMBULATORY - HEALTHEAST (OUTPATIENT)
Dept: LAB | Facility: CLINIC | Age: 86
End: 2021-07-06

## 2021-07-06 DIAGNOSIS — I48.0 PAROXYSMAL ATRIAL FIBRILLATION (H): ICD-10-CM

## 2021-07-06 DIAGNOSIS — Z79.01 LONG TERM CURRENT USE OF ANTICOAGULANT THERAPY: ICD-10-CM

## 2021-07-06 DIAGNOSIS — I10 ESSENTIAL HYPERTENSION: ICD-10-CM

## 2021-07-06 LAB — INR PPP: 2.5 (ref 0.9–1.1)

## 2021-07-06 RX ORDER — METOPROLOL SUCCINATE 100 MG/1
TABLET, EXTENDED RELEASE ORAL
Qty: 90 TABLET | Refills: 2 | Status: SHIPPED | OUTPATIENT
Start: 2021-07-06 | End: 2022-04-11

## 2021-07-06 NOTE — TELEPHONE ENCOUNTER
Telephone Encounter by Gretta Maxwell, RN at 7/6/2021  3:40 PM     Author: Gretta Maxwell RN Service: -- Author Type: Registered Nurse    Filed: 7/6/2021  4:00 PM Encounter Date: 7/6/2021 Status: Signed    : Gretta Maxwell RN (Registered Nurse)       ANTICOAGULATION MANAGEMENT     Ginna Quiroga 86 y.o., female is on warfarin with Therapeutic INR result (goal range 2.0-3.0)    Recent labs: (last 7 days)     07/06/21  1402   INR 2.50*       ASSESSMENT     Source: Patient/Caregiver Call      Warfarin dosing taken: Warfarin taken as instructed    Diet: No new diet changes affecting INR    Illness, Injury or hospitalization: No    Medication changes: None    Signs or symptoms of bleeding or clotting: No    Previous INR: therapeutic last 2(+) visits    Additional findings: None     PLAN     Recommended plan for no diet, medication or health factor changes affecting INR:     Dosing instructions: Continue your current warfarin dose 1 mg daily on Mondays, Wednesdays and Fridays; and 2 mg daily rest of week (0% change)    Follow up no later than: 6 weeks     Telephone call with Ginna who verbalizes understanding and agrees to plan    Patient offered & declined to schedule next visit    Education provided: importance of therapeutic range and target INR goal and significance of current INR result    Plan made per [facility] anticoagulation protocol    Gretta Maxwell  Anticoagulation Clinic   [phone]    Anticoagulation Episode Summary     Current INR goal:  2.0-3.0   TTR:  80.9 % (1 y)   Next INR check:  8/17/2021   INR from last check:  2.50 (7/6/2021)   Weekly max warfarin dose:     Target end date:     INR check location:     Preferred lab:     Send INR reminders to:  [facility]    Indications    Long term current use of anticoagulant therapy [Z79.01]  DVT (deep venous thrombosis) (H) (Resolved) [I82.409]           Comments:           Anticoagulation Care Providers     Provider  Role Specialty Phone number    Dawson Cloud MD Referring Internal Medicine [phone]

## 2021-07-06 NOTE — TELEPHONE ENCOUNTER
Telephone Encounter by Cherie Pierre RN at 7/6/2021  4:08 PM     Author: Cherie Pierre RN Service: -- Author Type: Registered Nurse    Filed: 7/6/2021  4:09 PM Encounter Date: 7/6/2021 Status: Signed    : Cherie Pierre RN (Registered Nurse)       Refill Approved    Rx renewed per Medication Renewal Policy. Medication was last renewed on 11/09/2020. New pharmacy needed. Last office visit was 03/18/2021 with PCP.    Cherie Pierre, [facility] Triage/Med Refill 7/6/2021     Requested Prescriptions   Pending Prescriptions Disp Refills   ? metoprolol succinate (TOPROL-XL) 100 MG 24 hr tablet [Pharmacy Med Name: METOPROL SUC TAB 100MG ER TABLET] 90 tablet 3     Sig: TAKE ONE TABLET BY MOUTH ONCE DAILY       Beta-Blockers Refill Protocol Passed - 7/6/2021 12:14 PM        Passed - PCP or prescribing provider visit in past 12 months or next 3 months     Last office visit with prescriber/PCP: 3/18/2021 Dawson Cloud MD OR same dept: 11/9/2020 Dawson Cloud MD OR same specialty: 3/18/2021 Dawson Cloud MD  Last physical: 1/29/2019 Last MTM visit: Visit date not found   Next visit within 3 mo: Visit date not found  Next physical within 3 mo: Visit date not found  Prescriber OR PCP: Dawson Cloud MD  Last diagnosis associated with med order: 1. Essential hypertension  - metoprolol succinate (TOPROL-XL) 100 MG 24 hr tablet [Pharmacy Med Name: METOPROL SUC TAB 100MG ER TABLET]; TAKE ONE TABLET BY MOUTH ONCE DAILY  Dispense: 90 tablet; Refill: 3    If protocol passes may refill for 12 months if within 3 months of last provider visit (or a total of 15 months).             Passed - Blood pressure filed in past 12 months     BP Readings from Last 1 Encounters:   03/18/21 138/80

## 2021-07-07 ENCOUNTER — COMMUNICATION - HEALTHEAST (OUTPATIENT)
Dept: INTERNAL MEDICINE | Facility: CLINIC | Age: 86
End: 2021-07-07

## 2021-07-07 DIAGNOSIS — I10 ESSENTIAL HYPERTENSION: ICD-10-CM

## 2021-07-07 RX ORDER — ENALAPRIL MALEATE 10 MG/1
TABLET ORAL
Qty: 180 TABLET | Refills: 3 | Status: SHIPPED | OUTPATIENT
Start: 2021-07-07 | End: 2021-09-16

## 2021-07-07 NOTE — TELEPHONE ENCOUNTER
Telephone Encounter by Dayna Alston CMA at 7/7/2021  3:13 PM     Author: Dayna Alston CMA Service: -- Author Type: Certified Medical Assistant    Filed: 7/7/2021  3:15 PM Encounter Date: 7/7/2021 Status: Signed    : Dayna Alston CMA (Certified Medical Assistant)       Refill Request  Medication name:   Requested Prescriptions     Pending Prescriptions Disp Refills   ? enalapril (VASOTEC) 10 MG tablet 180 tablet 3     Sig: TAKE 1 TABLET(10 MG) BY MOUTH TWICE DAILY     Who prescribed the medication: angeline  Last refill on medication: 4/14/21  [facility] Pharmacy[facility]  Last appointment with PCP: 3/18/2021  Next appointment: 9/14/2021    Dayna Alston CMA

## 2021-08-03 PROBLEM — I48.0 PAROXYSMAL ATRIAL FIBRILLATION (H): Status: RESOLVED | Noted: 2020-11-09 | Resolved: 2020-11-09

## 2021-08-17 DIAGNOSIS — I48.0 PAROXYSMAL ATRIAL FIBRILLATION (H): ICD-10-CM

## 2021-08-17 DIAGNOSIS — Z79.01 LONG TERM CURRENT USE OF ANTICOAGULANT THERAPY: Primary | ICD-10-CM

## 2021-08-17 DIAGNOSIS — Z86.711 HISTORY OF PULMONARY EMBOLUS (PE): ICD-10-CM

## 2021-08-17 NOTE — PROGRESS NOTES
This patient is coming in Thursday for her inr, and it looks like there is no inr point of care order in there, just the one that is drawn out of the arm. Please enter a point of care inr order, thank you.

## 2021-08-19 ENCOUNTER — ANTICOAGULATION THERAPY VISIT (OUTPATIENT)
Dept: ANTICOAGULATION | Facility: CLINIC | Age: 86
End: 2021-08-19

## 2021-08-19 ENCOUNTER — LAB (OUTPATIENT)
Dept: LAB | Facility: CLINIC | Age: 86
End: 2021-08-19
Payer: COMMERCIAL

## 2021-08-19 DIAGNOSIS — Z86.711 HISTORY OF PULMONARY EMBOLUS (PE): ICD-10-CM

## 2021-08-19 DIAGNOSIS — I48.0 PAROXYSMAL ATRIAL FIBRILLATION (H): ICD-10-CM

## 2021-08-19 DIAGNOSIS — Z79.01 LONG TERM CURRENT USE OF ANTICOAGULANT THERAPY: ICD-10-CM

## 2021-08-19 DIAGNOSIS — Z79.01 LONG TERM CURRENT USE OF ANTICOAGULANT THERAPY: Primary | ICD-10-CM

## 2021-08-19 LAB — INR BLD: 2.3 (ref 0.9–1.1)

## 2021-08-19 PROCEDURE — 36416 COLLJ CAPILLARY BLOOD SPEC: CPT

## 2021-08-19 PROCEDURE — 85610 PROTHROMBIN TIME: CPT

## 2021-08-19 NOTE — PROGRESS NOTES
ANTICOAGULATION MANAGEMENT     Ginna Quiroga 86 year old female is on warfarin with therapeutic INR result. (Goal INR 2.0-3.0)    Recent labs: (last 7 days)     08/19/21  1119   INR 2.3*       ASSESSMENT     Source(s): Patient/Caregiver Call and Template       Warfarin doses taken: Warfarin taken as instructed    Diet: No new diet changes identified    New illness, injury, or hospitalization: No    Medication/supplement changes: None noted    Signs or symptoms of bleeding or clotting: No    Previous INR: Therapeutic last 2(+) visits    Additional findings: None     PLAN     Recommended plan for no diet, medication or health factor changes affecting INR     Dosing Instructions: Continue your current warfarin dose with next INR in 6 weeks       Summary  As of 8/19/2021    Full warfarin instructions:  1 mg every Mon, Wed, Fri; 2 mg all other days   Next INR check:  9/30/2021             Telephone call with Ginna who verbalizes understanding and agrees to plan    Patient offered & declined to schedule next visit    Education provided: Importance of therapeutic range    Plan made per [facility] anticoagulation protocol    Gretta Maxwell RN  Anticoagulation Clinic  8/19/2021    _______________________________________________________________________     Anticoagulation Episode Summary     Current INR goal:  2.0-3.0   TTR:  80.9 % (1 y)   Target end date:     Send INR reminders to:  [facility]    Indications    Long term current use of anticoagulant therapy [Z79.01]           Comments:           Anticoagulation Care Providers     Provider Role Specialty Phone number    Dawson Cloud MD Referring Internal Medicine [phone]

## 2021-09-14 ENCOUNTER — OFFICE VISIT (OUTPATIENT)
Dept: INTERNAL MEDICINE | Facility: CLINIC | Age: 86
End: 2021-09-14
Payer: COMMERCIAL

## 2021-09-14 VITALS
WEIGHT: 163 LBS | HEART RATE: 70 BPM | BODY MASS INDEX: 27.83 KG/M2 | OXYGEN SATURATION: 98 % | DIASTOLIC BLOOD PRESSURE: 90 MMHG | SYSTOLIC BLOOD PRESSURE: 156 MMHG | HEIGHT: 64 IN

## 2021-09-14 DIAGNOSIS — Z79.01 LONG TERM CURRENT USE OF ANTICOAGULANT THERAPY: ICD-10-CM

## 2021-09-14 DIAGNOSIS — Z95.0 STATUS POST BIVENTRICULAR CARDIAC PACEMAKER INSERTION: ICD-10-CM

## 2021-09-14 DIAGNOSIS — E55.9 VITAMIN D DEFICIENCY: ICD-10-CM

## 2021-09-14 DIAGNOSIS — I49.5 TACHY-BRADY SYNDROME (H): ICD-10-CM

## 2021-09-14 DIAGNOSIS — Z00.00 ENCOUNTER FOR MEDICARE ANNUAL WELLNESS EXAM: Primary | ICD-10-CM

## 2021-09-14 DIAGNOSIS — M81.0 AGE-RELATED OSTEOPOROSIS WITHOUT CURRENT PATHOLOGICAL FRACTURE: ICD-10-CM

## 2021-09-14 DIAGNOSIS — I10 ESSENTIAL HYPERTENSION: ICD-10-CM

## 2021-09-14 DIAGNOSIS — I48.0 PAROXYSMAL ATRIAL FIBRILLATION (H): ICD-10-CM

## 2021-09-14 DIAGNOSIS — R60.0 PERIPHERAL EDEMA: ICD-10-CM

## 2021-09-14 DIAGNOSIS — Z86.711 HISTORY OF PULMONARY EMBOLUS (PE): ICD-10-CM

## 2021-09-14 DIAGNOSIS — N18.31 STAGE 3A CHRONIC KIDNEY DISEASE (H): ICD-10-CM

## 2021-09-14 DIAGNOSIS — L98.9 FACIAL SKIN LESION: ICD-10-CM

## 2021-09-14 PROBLEM — S32.592A CLOSED FRACTURE OF MULTIPLE PUBIC RAMI, LEFT, INITIAL ENCOUNTER (H): Status: RESOLVED | Noted: 2020-09-20 | Resolved: 2021-09-14

## 2021-09-14 PROBLEM — M80.00XD AGE-RELATED OSTEOPOROSIS WITH CURRENT PATHOLOGICAL FRACTURE WITH ROUTINE HEALING: Status: RESOLVED | Noted: 2020-11-09 | Resolved: 2021-09-14

## 2021-09-14 PROBLEM — R53.81 PHYSICAL DECONDITIONING: Status: RESOLVED | Noted: 2020-10-06 | Resolved: 2021-09-14

## 2021-09-14 PROBLEM — I44.7 LBBB (LEFT BUNDLE BRANCH BLOCK): Status: ACTIVE | Noted: 2021-09-14

## 2021-09-14 PROBLEM — S32.592A CLOSED FRACTURE OF MULTIPLE PUBIC RAMI, LEFT, INITIAL ENCOUNTER (H): Status: ACTIVE | Noted: 2020-09-20

## 2021-09-14 LAB
ALBUMIN SERPL-MCNC: 3.8 G/DL (ref 3.5–5)
ALBUMIN UR-MCNC: NEGATIVE MG/DL
ALP SERPL-CCNC: 104 U/L (ref 45–120)
ALT SERPL W P-5'-P-CCNC: 21 U/L (ref 0–45)
ANION GAP SERPL CALCULATED.3IONS-SCNC: 11 MMOL/L (ref 5–18)
APPEARANCE UR: CLEAR
AST SERPL W P-5'-P-CCNC: 23 U/L (ref 0–40)
BACTERIA #/AREA URNS HPF: ABNORMAL /HPF
BILIRUB SERPL-MCNC: 0.7 MG/DL (ref 0–1)
BILIRUB UR QL STRIP: NEGATIVE
BUN SERPL-MCNC: 30 MG/DL (ref 8–28)
CALCIUM SERPL-MCNC: 9.5 MG/DL (ref 8.5–10.5)
CAOX CRY #/AREA URNS HPF: ABNORMAL /HPF
CHLORIDE BLD-SCNC: 103 MMOL/L (ref 98–107)
CHOLEST SERPL-MCNC: 175 MG/DL
CO2 SERPL-SCNC: 30 MMOL/L (ref 22–31)
COLOR UR AUTO: YELLOW
CREAT SERPL-MCNC: 1.07 MG/DL (ref 0.6–1.1)
ERYTHROCYTE [DISTWIDTH] IN BLOOD BY AUTOMATED COUNT: 14.5 % (ref 10–15)
FASTING STATUS PATIENT QL REPORTED: YES
GFR SERPL CREATININE-BSD FRML MDRD: 47 ML/MIN/1.73M2
GLUCOSE BLD-MCNC: 94 MG/DL (ref 70–125)
GLUCOSE UR STRIP-MCNC: NEGATIVE MG/DL
HCT VFR BLD AUTO: 39.8 % (ref 35–47)
HDLC SERPL-MCNC: 44 MG/DL
HGB BLD-MCNC: 12.7 G/DL (ref 11.7–15.7)
HGB UR QL STRIP: ABNORMAL
KETONES UR STRIP-MCNC: NEGATIVE MG/DL
LDLC SERPL CALC-MCNC: 113 MG/DL
LEUKOCYTE ESTERASE UR QL STRIP: NEGATIVE
MCH RBC QN AUTO: 27.5 PG (ref 26.5–33)
MCHC RBC AUTO-ENTMCNC: 31.9 G/DL (ref 31.5–36.5)
MCV RBC AUTO: 86 FL (ref 78–100)
NITRATE UR QL: NEGATIVE
PH UR STRIP: 7 [PH] (ref 5–8)
PLATELET # BLD AUTO: 246 10E3/UL (ref 150–450)
POTASSIUM BLD-SCNC: 3.9 MMOL/L (ref 3.5–5)
PROT SERPL-MCNC: 7.1 G/DL (ref 6–8)
RBC # BLD AUTO: 4.61 10E6/UL (ref 3.8–5.2)
RBC #/AREA URNS AUTO: ABNORMAL /HPF
SODIUM SERPL-SCNC: 144 MMOL/L (ref 136–145)
SP GR UR STRIP: 1.02 (ref 1–1.03)
SQUAMOUS #/AREA URNS AUTO: ABNORMAL /LPF
TRIGL SERPL-MCNC: 91 MG/DL
UROBILINOGEN UR STRIP-ACNC: 0.2 E.U./DL
WBC # BLD AUTO: 8 10E3/UL (ref 4–11)
WBC #/AREA URNS AUTO: ABNORMAL /HPF

## 2021-09-14 PROCEDURE — 81001 URINALYSIS AUTO W/SCOPE: CPT | Performed by: INTERNAL MEDICINE

## 2021-09-14 PROCEDURE — G0008 ADMIN INFLUENZA VIRUS VAC: HCPCS | Performed by: INTERNAL MEDICINE

## 2021-09-14 PROCEDURE — 85027 COMPLETE CBC AUTOMATED: CPT | Performed by: INTERNAL MEDICINE

## 2021-09-14 PROCEDURE — 99214 OFFICE O/P EST MOD 30 MIN: CPT | Mod: 25 | Performed by: INTERNAL MEDICINE

## 2021-09-14 PROCEDURE — 90662 IIV NO PRSV INCREASED AG IM: CPT | Performed by: INTERNAL MEDICINE

## 2021-09-14 PROCEDURE — 36415 COLL VENOUS BLD VENIPUNCTURE: CPT | Performed by: INTERNAL MEDICINE

## 2021-09-14 PROCEDURE — 80053 COMPREHEN METABOLIC PANEL: CPT | Performed by: INTERNAL MEDICINE

## 2021-09-14 PROCEDURE — 99397 PER PM REEVAL EST PAT 65+ YR: CPT | Mod: 25 | Performed by: INTERNAL MEDICINE

## 2021-09-14 PROCEDURE — 82306 VITAMIN D 25 HYDROXY: CPT | Performed by: INTERNAL MEDICINE

## 2021-09-14 PROCEDURE — 80061 LIPID PANEL: CPT | Performed by: INTERNAL MEDICINE

## 2021-09-14 ASSESSMENT — MIFFLIN-ST. JEOR: SCORE: 1168.33

## 2021-09-14 ASSESSMENT — ACTIVITIES OF DAILY LIVING (ADL): CURRENT_FUNCTION: TRANSPORTATION REQUIRES ASSISTANCE

## 2021-09-14 NOTE — PROGRESS NOTES
The patient reports that she has difficulty with activities of daily living. I have asked that the patient make a follow up appointment in *** weeks where this issue will be further evaluated and addressed.  Information on urinary incontinence and treatment options given to patient.  She is at risk for lack of exercise and has been provided with information to increase physical activity for the benefit of her well-being.

## 2021-09-14 NOTE — PATIENT INSTRUCTIONS
You should get the new shingles vaccine, Shingrix at your pharmacy.    Covid booster will be due in November    You need to schedule appointment with a dermatologist ASAP to get the lesion on the left side of your face evaluated.  It appears to be a basal cell skin cancer.  Please call dermatology consultants at [phone]    Consider getting an annual mammogram    Annual eye exam with your optometrist or ophthalmologist    I would encourage you to try to get exercise on a more regular basis setting a goal of walking for 30 to 40 minutes at least 3 times per week.      Patient Education   Personalized Prevention Plan  You are due for the preventive services outlined below.  Your care team is available to assist you in scheduling these services.  If you have already completed any of these items, please share that information with your care team to update in your medical record.  Health Maintenance Due   Topic Date Due     ANNUAL REVIEW OF  ORDERS  Never done     Zoster (Shingles) Vaccine (2 of 3) 02/26/2009     Activities of Daily Living    Your Health Risk Assessment indicates you have difficulties with activities of daily living such as housework, bathing, preparing meals, taking medication, etc. Please make a follow up appointment for us to address this issue in more detail.    Urinary Incontinence, Female (Adult)   Urinary incontinence means loss of bladder control. This problem affects many women, especially as they get older. If you have incontinence, you may be embarrassed to ask for help. But know that this problem can be treated.   Types of Incontinence  There are different types of incontinence. Two of the main types are described here. You can have more than one type.     Stress incontinence. With this type, urine leaks when pressure (stress) is put on the bladder. This may happen when you cough, sneeze, or laugh. Stress incontinence most often occurs because the pelvic floor muscles that support the  bladder and urethra are weak. This can happen after pregnancy and vaginal childbirth or a hysterectomy. It can also be due to excess body weight or hormone changes.    Urge incontinence (also called overactive bladder). With this type, a sudden urge to urinate is felt often. This may happen even though there may not be much urine in the bladder. The need to urinate often during the night is common. Urge incontinence most often occurs because of bladder spasms. This may be due to bladder irritation or infection. Damage to bladder nerves or pelvic muscles, constipation, and certain medicines can also lead to urge incontinence.  Treatment depends on the cause. Further evaluation is needed to find the type you have. This will likely include an exam and certain tests. Based on the results, you and your healthcare provider can then plan treatment. Until a diagnosis is made, the home care tips below can help ease symptoms.   Home care    Do pelvic floor muscle exercises, if they are prescribed. The pelvic floor muscles help support the bladder and urethra. Many women find that their symptoms improve when doing special exercises that strengthen these muscles. To do the exercises, contract the muscles you would use to stop your stream of urine. But do this when you re not urinating. Hold for 10 seconds, then relax. Repeat 10 to 20 times in a row, at least 3 times a day. Your healthcare provider may give you other instructions for how to do the exercises and how often.    Keep a bladder diary. This helps track how often and how much you urinate over a set period of time. Bring this diary with you to your next visit with the provider. The information can help your provider learn more about your bladder problem.    Lose weight, if advised to by your provider. Extra weight puts pressure on the bladder. Your provider can help you create a weight-loss plan that s right for you. This may include exercising more and making certain  diet changes.    Don't have foods and drinks that may irritate the bladder. These can include alcohol and caffeinated drinks.    Quit smoking. Smoking and other tobacco use can lead to a long-term (chronic) cough that strains the pelvic floor muscles. Smoking may also damage the bladder and urethra. Talk with your provider about treatments or methods you can use to quit smoking.    If drinking large amounts of fluid makes you have symptoms, you may be advised to limit your fluid intake. You may also be advised to drink most of your fluids during the day and to limit fluids at night.    If you re worried about urine leakage or accidents, you may wear absorbent pads to catch urine. Change the pads often. This helps reduce discomfort. It may also reduce the risk of skin or bladder infections.    Follow-up care  Follow up with your healthcare provider, or as directed. It may take some to find the right treatment for your problem. But healthy lifestyle changes can be made right away. These include such things as exercising on a regular basis, eating a healthy diet, losing weight (if needed), and quitting smoking. Your treatment plan may include special therapies or medicines. Certain procedures or surgery may also be options. Talk about any questions you have with your provider.   When to seek medical advice  Call the healthcare provider right away if any of these occur:    Fever of 100.4 F (38 C) or higher, or as directed by your provider    Bladder pain or fullness    Belly swelling    Nausea or vomiting    Back pain    Weakness, dizziness, or fainting  [employer] last reviewed this educational content on 1/1/2020 2000-2021 [employer]. All rights reserved. This information is not intended as a substitute for professional medical care. Always follow your healthcare professional's instructions.          Kegel Exercises  Kegel exercises are done to help strengthen the muscles in your pelvic floor. You  don t need special clothing or equipment. They re easy to learn and simple to do. And if you do them right, no one can tell you re doing them, so they can be done almost anywhere. Your healthcare provider, nurse, or physical therapist can answer any questions you have and help you get started.   A weak pelvic floor  The pelvic floor muscles may weaken due to aging, pregnancy and vaginal childbirth, injury, surgery, chronic cough, or lack of exercise. If the pelvic floor is weak, your bladder and other pelvic organs may sag out of place. The urethra may also open too easily and allow urine to leak out. Kegel exercises can help you strengthen your pelvic floor muscles. Then they can better support the pelvic organs and control urine flow.     How Kegel exercises are done  Try each of the Kegel exercises described below. When you re doing them, try not to move your leg, buttock, or stomach muscles:     Contract as if you were stopping your urine stream. But do it when you re not urinating.    Tighten your rectum as if trying not to pass gas. Contract your anus, but don t move your buttocks.    You may place a finger or 2 in the vagina and squeeze your finger with your vagina to learn which muscles to tighten.  Try to hold each Kegel for a slow count to 5. You probably won t be able to hold them for that long at first. But keep practicing. It will get easier as your pelvic floor gets stronger. Eventually, special weights that you place in your vagina may be recommended to help make your Kegels even more effective. Talk to your healthcare provider if you have trouble doing Kegel exercises.   Helpful tips  Here are some tips to follow:    Do your Kegels as often as you can. The more you do them, the faster you ll feel the results.    Pick an activity you do often as a reminder. For instance, do your Kegels every time you sit down.    Tighten your pelvic floor before you sneeze, get up from a chair, cough, laugh, or lift.  This can help prevent urine, gas, or stool leakage.  [employer] last reviewed this educational content on 8/1/2020 2000-2021 [employer]. All rights reserved. This information is not intended as a substitute for professional medical care. Always follow your healthcare professional's instructions.             Exercise for a Healthier Heart  You may wonder how you can improve the health of your heart. If you re thinking about exercise, you re on the right track. You don t need to become an athlete. But you do need a certain amount of brisk exercise to help strengthen your heart. If you have been diagnosed with a heart condition, your healthcare provider may advise exercise to help stabilize your condition. To help make exercise a habit, choose safe, fun activities.      Exercise with a friend. When activity is fun, you're more likely to stick with it.   Before you start  Check with your healthcare provider before starting an exercise program. This is especially important if you have not been active for a while. It's also important if you have a long-term (chronic) health problem such as heart disease, diabetes, or obesity. Or if you are at high risk for having these problems.   Why exercise?  Exercising regularly offers many healthy rewards. It can help you do all of the following:     Improve your blood cholesterol level to help prevent further heart trouble    Lower your blood pressure to help prevent a stroke or heart attack    Control diabetes, or reduce your risk of getting this disease    Improve your heart and lung function    Reach and stay at a healthy weight    Make your muscles stronger so you can stay active    Prevent falls and fractures by slowing the loss of bone mass (osteoporosis)    Manage stress better    Reduce your blood pressure    Improve your sense of self and your body image  Exercise tips      Ease into your routine. Set small goals. Then build on them. If you are not sure what  your activity level should be, talk with your healthcare provider first before starting an exercise routine.    Exercise on most days. Aim for a total of 150 minutes (2 hours and 30 minutes) or more of moderate-intensity aerobic activity each week. Or 75 minutes (1 hour and 15 minutes) or more of vigorous-intensity aerobic activity each week. Or try for a combination of both. Moderate activity means that you breathe heavier and your heart rate increases but you can still talk. Think about doing 40 minutes of moderate exercise, 3 to 4 times a week. For best results, activity should last for about 40 minutes to lower blood pressure and cholesterol. It's OK to work up to the 40-minute period over time. Examples of moderate-intensity activity are walking 1 mile in 15 minutes. Or doing 30 to 45 minutes of yard work.    Step up your daily activity level.  Along with your exercise program, try being more active the whole day. Walk instead of drive. Or park further away so that you take more steps each day. Do more household tasks or yard work. You may not be able to meet the advised mount of physical activity. But doing some moderate- or vigorous-intensity aerobic activity can help reduce your risk for heart disease. Your healthcare provider can help you figure out what is best for you.    Choose 1 or more activities you enjoy.  Walking is one of the easiest things you can do. You can also try swimming, riding a bike, dancing, or taking an exercise class.    When to call your healthcare provider  Call your healthcare provider if you have any of these:     Chest pain or feel dizzy or lightheaded    Burning, tightness, pressure, or heaviness in your chest, neck, shoulders, back, or arms    Abnormal shortness of breath    More joint or muscle pain    A very fast or irregular heartbeat (palpitations)  [employer] last reviewed this educational content on 7/1/2019 2000-2021 [employer]. All rights reserved. This  information is not intended as a substitute for professional medical care. Always follow your healthcare professional's instructions.

## 2021-09-14 NOTE — PROGRESS NOTES
"SUBJECTIVE:   Ginna Quiroga is a 86 year old female who presents for Preventive Visit.    HPI-in addition to her annual wellness visit, Ginna is here to follow-up paroxysmal atrial fibrillation and tachybradycardia syndrome along with history of pulmonary embolus, osteoporosis, hypertension and chronic kidney disease.  See assessment and plan for details.    Patient has been advised of split billing requirements and indicates understanding: Yes   Are you in the first 12 months of your Medicare coverage?  No    Healthy Habits:     In general, how would you rate your overall health?  Good    Duration of exercise:  Less than 15 minutes    Do you usually eat at least 4 servings of fruit and vegetables a day, include whole grains    & fiber and avoid regularly eating high fat or \"junk\" foods?  Yes    Barriers to taking medications:  None    Medication side effects:  None    Ability to successfully perform activities of daily living:  Transportation requires assistance    Home Safety:  No safety concerns identified    Hearing Impairment:  No hearing concerns    In the past 6 months, have you been bothered by leaking of urine? Yes    In general, how would you rate your overall mental or emotional health?  Good      PHQ-2 Total Score: 0    Additional concerns today:  No    Do you feel safe in your environment? Yes    Have you ever done Advance Care Planning? (For example, a Health Directive, POLST, or a discussion with a medical provider or your loved ones about your wishes): No, advance care planning information given to patient to review.  Patient plans to discuss their wishes with loved ones or provider.         Fall risk  Fallen 2 or more times in the past year?: No  Any fall with injury in the past year?: No  None  Cognitive Screening   1) Repeat 3 items (Leader, Season, Table)    2) Clock draw: ABNORMAL   3) 3 item recall: Recalls 3 objects  Results: 3 items recalled: COGNITIVE IMPAIRMENT LESS " "LIKELY    Mini-CogTM Copyright S Dorian. Licensed by the author for use in [facility]; reprinted with permission (naresh@.[facility]). All rights reserved.          Reviewed and updated as needed this visit by clinical staff  Tobacco  Allergies  Meds  Problems  Med Hx  Surg Hx  Fam Hx          Reviewed and updated as needed this visit by Provider  Tobacco  Allergies  Meds  Problems  Med Hx  Surg Hx  Fam Hx         Social History     Tobacco Use     Smoking status: Never Smoker     Smokeless tobacco: Never Used   Substance Use Topics     Alcohol use: Yes     Comment: 0-1/day     If you drink alcohol do you typically have >3 drinks per day or >7 drinks per week? No    Alcohol Use 9/14/2021   Prescreen: >3 drinks/day or >7 drinks/week? No   Prescreen: >3 drinks/day or >7 drinks/week? -               Current providers sharing in care for this patient include:   Patient Care Team:  Dawson Cloud MD as PCP - General (Internal Medicine)  Dawson Cloud MD as Assigned PCP    The following health maintenance items are reviewed in Epic and correct as of today:  Health Maintenance Due   Topic Date Due     ANNUAL REVIEW OF HM ORDERS  Never done     ZOSTER IMMUNIZATION (2 of 3) 02/26/2009     Lab work is in process        Pertinent mammograms are reviewed under the imaging tab.    Review of Systems  12 point review of system is negative other than what is discussed in the assessment and plan    OBJECTIVE:   BP (!) 156/90 (BP Location: Left arm, Patient Position: Sitting, Cuff Size: Adult Regular)   Pulse 70   Ht 1.632 m (5' 4.25\")   Wt 73.9 kg (163 lb)   SpO2 98%   BMI 27.76 kg/m   Estimated body mass index is 27.76 kg/m  as calculated from the following:    Height as of this encounter: 1.632 m (5' 4.25\").    Weight as of this encounter: 73.9 kg (163 lb).  Physical Exam  EYES: Eyelids, conjunctiva, and sclera were normal. Pupils were normal. Cornea, iris, and lens were normal " bilaterally.  HEAD, EARS, NOSE, MOUTH, AND THROAT: Head and face were normal. TMs and external auditory canals are normal  NECK: Neck appearance was normal. There were no neck masses and the thyroid was not enlarged and no nodules are felt.  No lymphadenopathy.  RESPIRATORY: Breathing pattern was normal and the chest moved symmetrically.   Lung sounds were normal and there were no rales or wheezes.  CARDIOVASCULAR: Heart rate and rhythm were normal.  S1 and S2 were normal and there were no extra sounds or murmurs. Peripheral pulses in arms and legs were normal.  Jugular venous pressure was normal.  There was no peripheral edema.  No carotid bruits.  BREAST: No palpable masses or tenderness.  No axillary nodes.  GASTROINTESTINAL:  Bowel sounds were present.   Palpation detected no tenderness, mass, or enlarged organs.   MUSCULOSKELETAL: Skeletal configuration was normal and muscle mass was normal for age. Joint appearance was overall normal.  LYMPHATIC: There were no enlarged nodes.  SKIN/HAIR/NAILS: Skin color was normal.  Hair and nails were normal.There were no skin lesions.  NEUROLOGIC: The patient was alert and oriented to person, place, time, and circumstance. Speech was normal. Cranial nerves were normal. Motor strength was normal for age. The patient was normally coordinated.  Sensation intact.  PSYCHIATRIC:  Mood and affect were normal and the patient had normal recent and remote memory. The patient's judgment and insight were normal.        ASSESSMENT / PLAN:   1. Encounter for Medicare annual wellness exam  Immunizations are reviewed and will provide flu shot.  Recommending Shingrix which she should obtain at her pharmacy.  She will need her Covid booster in November. Living will discussed.  Non-smoker.  Uses alcohol in moderation.  Regular exercise discussed.  Further colonoscopy is not indicated.  Breast exam completed and she will continue to get a mammogram annually.    Last DEXA was 2020 and this  should be repeated next year. Dementia and depression screening completed.  She sees an ophthalmologist every year. Skin exam performed and recommending regular use of sunblock.  She needs to see a dermatologist ASAP.  Will screen for diabetes with fasting glucose.    2. Tachy-kayleen syndrome (H)  Hospitalized with syncope with new diagnosis of atrial fibrillation.  Ablation completed and subsequent placement of permanent pacemaker for tachybradycardia syndrome.    3. Paroxysmal atrial fibrillation (H)  Continues chronic anticoagulation with warfarin.  We will continue to monitor INR.  Continues on metoprolol.    4. History of pulmonary embolus (PE)  Chronically anticoagulated with warfarin    5. Status post biventricular cardiac pacemaker insertion  Permanent pacemaker placement.  Continues interrogation every 3 months    6. Age-related osteoporosis without current pathological fracture  History of pelvic fracture.  Although DEXA results were reasonably good, diagnosis of osteoporosis and started on Fosamax November 2020 with plans to continue for a total of 5 years.  She should maintain good calcium and vitamin D intake.  We discussed the importance of weightbearing exercise.    7. Essential hypertension  Blood pressure is not at goal despite taking enalapril and metoprolol.  She is not using NSAIDs.  Will await lab results and will plan to increase dose of her ACE inhibitor  - CBC with platelets; Future  - Comprehensive metabolic panel (BMP + Alb, Alk Phos, ALT, AST, Total. Bili, TP); Future  - Lipid Profile (Chol, Trig, HDL, LDL calc); Future  - UA Macro with Reflex to Micro and Culture - lab collect; Future  - CBC with platelets  - Comprehensive metabolic panel (BMP + Alb, Alk Phos, ALT, AST, Total. Bili, TP)  - Lipid Profile (Chol, Trig, HDL, LDL calc)  - UA Macro with Reflex to Micro and Culture - lab collect  - Urine Microscopic    8. Stage 3a chronic kidney disease  Rechecking renal function.  She should  "avoid nephrotoxins.    9. Facial skin lesion  Lesion on left side of face very suspicious for basal cell skin cancer and I am again asking her to make an appointment with dermatology ASAP with phone number provided    10. Peripheral edema  Edema generally well controlled with current dose of furosemide    11. Vitamin D deficiency  Recheck vitamin D level on replacement  - 25 OH Vit D therapy monitoring; Future  - 25 OH Vit D therapy monitoring    12. Long term current use of anticoagulant therapy  Chronically anticoagulated with warfarin    Patient has been advised of split billing requirements and indicates understanding: Yes      Estimated body mass index is 27.76 kg/m  as calculated from the following:    Height as of this encounter: 1.632 m (5' 4.25\").    Weight as of this encounter: 73.9 kg (163 lb).        She reports that she has never smoked. She has never used smokeless tobacco.      Appropriate preventive services were discussed with this patient, including applicable screening as appropriate for cardiovascular disease, diabetes, osteopenia/osteoporosis, and glaucoma.  As appropriate for age/gender, discussed screening for colorectal cancer, prostate cancer, breast cancer, and cervical cancer. Checklist reviewing preventive services available has been given to the patient.    Reviewed patients plan of care and provided an AVS. The Basic Care Plan (routine screening as documented in Health Maintenance) for Ginna meets the Care Plan requirement. This Care Plan has been established and reviewed with the Patient.    Counseling Resources:  ATP IV Guidelines  Pooled Cohorts Equation Calculator  Breast Cancer Risk Calculator  Breast Cancer: Medication to Reduce Risk  FRAX Risk Assessment  ICSI Preventive Guidelines  Dietary Guidelines for Americans, 2010  USDA's MyPlate  ASA Prophylaxis  Lung CA Screening    Dawson Cloud MD  [facility] " Risks:      The patient reports that she has difficulty with activities of daily living.  She is relying on her son for transportation needs.  Information on urinary incontinence and treatment options given to patient.  She is at risk for lack of exercise and has been provided with information to increase physical activity for the benefit of her well-being.

## 2021-09-14 NOTE — LETTER
September 22, 2021      Ginna Quiroga  [address]  [place] [address]        Dear Ginna,        Resulted Orders   CBC with platelets   Result Value Ref Range    WBC Count 8.0 4.0 - 11.0 10e3/uL    RBC Count 4.61 3.80 - 5.20 10e6/uL    Hemoglobin 12.7 11.7 - 15.7 g/dL    Hematocrit 39.8 35.0 - 47.0 %    MCV 86 78 - 100 fL    MCH 27.5 26.5 - 33.0 pg    MCHC 31.9 31.5 - 36.5 g/dL    RDW 14.5 10.0 - 15.0 %    Platelet Count 246 150 - 450 10e3/uL   Comprehensive metabolic panel (BMP + Alb, Alk Phos, ALT, AST, Total. Bili, TP)   Result Value Ref Range    Sodium 144 136 - 145 mmol/L    Potassium 3.9 3.5 - 5.0 mmol/L    Chloride 103 98 - 107 mmol/L    Carbon Dioxide (CO2) 30 22 - 31 mmol/L    Anion Gap 11 5 - 18 mmol/L    Urea Nitrogen 30 (H) 8 - 28 mg/dL    Creatinine 1.07 0.60 - 1.10 mg/dL    Calcium 9.5 8.5 - 10.5 mg/dL    Glucose 94 70 - 125 mg/dL    Alkaline Phosphatase 104 45 - 120 U/L    AST 23 0 - 40 U/L    ALT 21 0 - 45 U/L    Protein Total 7.1 6.0 - 8.0 g/dL    Albumin 3.8 3.5 - 5.0 g/dL    Bilirubin Total 0.7 0.0 - 1.0 mg/dL    GFR Estimate 47 (L) >60 mL/min/1.73m2      Comment:      As of July 11, 2021, eGFR is calculated by the CKD-EPI creatinine equation, without race adjustment. eGFR can be influenced by muscle mass, exercise, and diet. The reported eGFR is an estimation only and is only applicable if the renal function is stable.   Lipid Profile (Chol, Trig, HDL, LDL calc)   Result Value Ref Range    Cholesterol 175 <=199 mg/dL    Triglycerides 91 <=149 mg/dL    Direct Measure HDL 44 (L) >=50 mg/dL      Comment:      HDL Cholesterol Reference Range:     0-2 years:   No reference ranges established for patients under 2 years old  at [facility] for lipid analytes.    2-8 years:  Greater than 45 mg/dL     18 years and older:   Female: Greater than or equal to 50 mg/dL   Male:   Greater than or equal to 40 mg/dL    LDL Cholesterol Calculated 113 <=129 mg/dL    Patient Fasting > 8hrs? Yes    UA  Macro with Reflex to Micro and Culture - lab collect   Result Value Ref Range    Color Urine Yellow Colorless, Straw, Light Yellow, Yellow    Appearance Urine Clear Clear    Glucose Urine Negative Negative mg/dL    Bilirubin Urine Negative Negative    Ketones Urine Negative Negative mg/dL    Specific Gravity Urine 1.025 1.005 - 1.030    Blood Urine Trace (A) Negative    pH Urine 7.0 5.0 - 8.0    Protein Albumin Urine Negative Negative mg/dL    Urobilinogen Urine 0.2 0.2, 1.0 E.U./dL    Nitrite Urine Negative Negative    Leukocyte Esterase Urine Negative Negative   25 OH Vit D therapy monitoring   Result Value Ref Range    25 OH Vitamin D2 <5 ug/L    25 OH Vitamin D3 50 ug/L    25 OH Vit D Total <55 20 - 75 ug/L      Comment:      Season, race, dietary intake, and treatment affect the concentration of 25-hydroxy-Vitamin D. Values may decrease during winter months and increase during summer months. Values 20-29 ug/L may indicate Vitamin D insufficiency and values <20 ug/L may indicate Vitamin D deficiency.    Narrative    This test was developed and its performance characteristics determined by the [facility],  Special Chemistry Laboratory. It has not been cleared or approved by the FDA. The laboratory is regulated under CLIA as qualified to perform high-complexity testing. This test is used for clinical purposes. It should not be regarded as investigational or for research.   Urine Microscopic   Result Value Ref Range    Bacteria Urine None Seen None Seen /HPF    RBC Urine 2-5 (A) 0-2 /HPF /HPF    WBC Urine 0-5 0-5 /HPF /HPF    Squamous Epithelials Urine Few (A) None Seen /LPF    Calcium Oxalate Crystals Urine Few (A) None Seen /HPF    Narrative    Urine Culture not indicated     Everything looks good.  Cholesterol is well controlled.  Kidney function is with mild impairment but stable.  Normal liver studies.  No diabetes.  No anemia.  Vitamin D level looks good.  Nothing of concern on the  urinalysis.    If you have any questions or concerns, please call the clinic at the number listed above.       Sincerely,      Dawson Cloud MD

## 2021-09-16 DIAGNOSIS — I10 ESSENTIAL HYPERTENSION: Primary | ICD-10-CM

## 2021-09-16 RX ORDER — ENALAPRIL MALEATE 20 MG/1
20 TABLET ORAL DAILY
Qty: 180 TABLET | Refills: 3 | Status: SHIPPED | OUTPATIENT
Start: 2021-09-16 | End: 2021-09-21

## 2021-09-16 NOTE — PROGRESS NOTES
Discussed blood pressure management.  Severely elevated when rechecked during recent visit.  I will have her recheck several times at home and if not under 130/80, she should increase her enalapril to 20 mg twice daily

## 2021-09-20 LAB
DEPRECATED CALCIDIOL+CALCIFEROL SERPL-MC: <55 UG/L (ref 20–75)
VITAMIN D2 SERPL-MCNC: <5 UG/L
VITAMIN D3 SERPL-MCNC: 50 UG/L

## 2021-09-21 ENCOUNTER — TELEPHONE (OUTPATIENT)
Dept: INTERNAL MEDICINE | Facility: CLINIC | Age: 86
End: 2021-09-21

## 2021-09-21 DIAGNOSIS — I10 ESSENTIAL HYPERTENSION: ICD-10-CM

## 2021-09-21 RX ORDER — ENALAPRIL MALEATE 20 MG/1
20 TABLET ORAL 2 TIMES DAILY
Qty: 180 TABLET | Refills: 3 | Status: SHIPPED | OUTPATIENT
Start: 2021-09-21 | End: 2021-10-28

## 2021-09-21 NOTE — TELEPHONE ENCOUNTER
Dr. Cloud    I called and spoke with patient and she said that she thought at her last visit you said that you were going to increase her BP medication.  But with the new dose she is still getting 20mg daily. Please advise.

## 2021-09-21 NOTE — TELEPHONE ENCOUNTER
Reason for Call:  Other prescription    Detailed comments: Patient called and stated she has questions regarding her enalapril (VASOTEC) 20 MG tablet prescription. Patient states she used to take 10 mg tablets twice daily and her new Rx states 20 mg tablet once daily. Patient just wants to confirm the dosage and instructions. Please call patient back to discuss her questions/concerns.     Phone Number Patient can be reached at: Home number on file [phone] (home)    Best Time: any    Can we leave a detailed message on this number? YES    Call taken on 9/21/2021 at 11:58 AM by Helen Banda

## 2021-09-21 NOTE — TELEPHONE ENCOUNTER
She is correct.  I do want her taking 20 mg of enalapril twice daily.  I will make the correction on the prescription sent to her pharmacy.

## 2021-09-30 ENCOUNTER — ANTICOAGULATION THERAPY VISIT (OUTPATIENT)
Dept: ANTICOAGULATION | Facility: CLINIC | Age: 86
End: 2021-09-30

## 2021-09-30 ENCOUNTER — LAB (OUTPATIENT)
Dept: LAB | Facility: CLINIC | Age: 86
End: 2021-09-30
Payer: COMMERCIAL

## 2021-09-30 DIAGNOSIS — Z79.01 LONG TERM CURRENT USE OF ANTICOAGULANT THERAPY: Primary | ICD-10-CM

## 2021-09-30 DIAGNOSIS — I48.0 PAROXYSMAL ATRIAL FIBRILLATION (H): ICD-10-CM

## 2021-09-30 DIAGNOSIS — Z86.711 HISTORY OF PULMONARY EMBOLUS (PE): ICD-10-CM

## 2021-09-30 DIAGNOSIS — Z79.01 LONG TERM CURRENT USE OF ANTICOAGULANT THERAPY: ICD-10-CM

## 2021-09-30 LAB — INR BLD: 3.4 (ref 0.9–1.1)

## 2021-09-30 PROCEDURE — 36416 COLLJ CAPILLARY BLOOD SPEC: CPT

## 2021-09-30 PROCEDURE — 85610 PROTHROMBIN TIME: CPT

## 2021-09-30 NOTE — PROGRESS NOTES
Anticoagulation Management    Unable to reach Ginna today.    Today's INR result of 3.4 is supratherapeutic (goal INR of 2.0-3.0).  Result received from: Clinic Lab    Follow up required to confirm medication changes    Left message to take reduced dose of warfarin, 1 mg tonight.      Anticoagulation clinic to follow up    Ольга Forbes RN

## 2021-10-01 NOTE — PROGRESS NOTES
ANTICOAGULATION MANAGEMENT     Ginna Quiroga 86 year old female is on warfarin with supratherapeutic INR result. (Goal INR 2.0-3.0)    Recent labs: (last 7 days)     09/30/21  1508   INR 3.4*       ASSESSMENT     Source(s): Chart Review, Patient/Caregiver Call and Template       Warfarin doses taken: Warfarin taken as instructed    Diet: Decreased greens/vitamin K in diet; plans to resume previous intake    New illness, injury, or hospitalization: No    Medication/supplement changes: Enalapril dose increased on 9/21/21 No interaction anticipated    Signs or symptoms of bleeding or clotting: No    Previous INR: Therapeutic last 2(+) visits    Additional findings: None     PLAN     Recommended plan for no diet, medication or health factor changes affecting INR     Dosing Instructions: Partial hold then continue your current warfarin dose with next INR in 2 weeks       Summary  As of 9/30/2021    Full warfarin instructions:  9/30: 1 mg; Otherwise 1 mg every Mon, Wed, Fri; 2 mg all other days   Next INR check:  10/14/2021             Telephone call with Ginna who verbalizes understanding and agrees to plan    Lab visit scheduled    Education provided: Importance of consistent vitamin K intake, Impact of vitamin K foods on INR, Goal range and significance of current result and Importance of therapeutic range    Plan made per [facility] anticoagulation protocol    Gretta Maxwell RN  Anticoagulation Clinic  10/1/2021    _______________________________________________________________________     Anticoagulation Episode Summary     Current INR goal:  2.0-3.0   TTR:  78.6 % (1 y)   Target end date:     Send INR reminders to:  PATRICIA BLACKBURN    Indications    Long term current use of anticoagulant therapy [Z79.01]           Comments:           Anticoagulation Care Providers     Provider Role Specialty Phone number    Dawson Cloud MD Referring Internal Medicine [phone]

## 2021-10-05 ENCOUNTER — TELEPHONE (OUTPATIENT)
Dept: INTERNAL MEDICINE | Facility: CLINIC | Age: 86
End: 2021-10-05

## 2021-10-05 DIAGNOSIS — Z86.711 HISTORY OF PULMONARY EMBOLUS (PE): ICD-10-CM

## 2021-10-05 DIAGNOSIS — Z79.01 LONG TERM CURRENT USE OF ANTICOAGULANT THERAPY: ICD-10-CM

## 2021-10-05 RX ORDER — WARFARIN SODIUM 2 MG/1
TABLET ORAL
Qty: 90 TABLET | Refills: 3 | Status: SHIPPED | OUTPATIENT
Start: 2021-10-05 | End: 2023-02-22

## 2021-10-05 NOTE — TELEPHONE ENCOUNTER
Reason for Call:  Medication or medication refill: warfarin ANTICOAGULANT (COUMADIN/JANTOVEN) 2 MG tablet    Do you use a [facility]? NO Name of the pharmacy and phone number for the current request:     [facility] [address], [phone]    Name of the medication requested:     warfarin ANTICOAGULANT (COUMADIN/JANTOVEN) 2 MG tablet   1 ordered         Summary: [WARFARIN ANTICOAGULANT (COUMADIN/JANTOVEN) 2 MG TABLET] TAKE ONE-HALF TO ONE TABLET (1-2MG) BY MOUTH ONCE DAILY AS DIRECTED ADJUST PER INR RESULT, Disp-90 tablet, R-1, Normal        Other request: Patient states she only has 3 pills left.     Can we leave a detailed message on this number? YES    Phone number patient can be reached at: Home number on file [phone] (home)    Best Time: any    Call taken on 10/5/2021 at 3:28 PM by Helen Banda

## 2021-10-05 NOTE — TELEPHONE ENCOUNTER
Rx keyed up and sent to Dr Cloud to sign.  Called pt and let her know that this will be sent today

## 2021-10-14 ENCOUNTER — ANTICOAGULATION THERAPY VISIT (OUTPATIENT)
Dept: ANTICOAGULATION | Facility: CLINIC | Age: 86
End: 2021-10-14

## 2021-10-14 ENCOUNTER — LAB (OUTPATIENT)
Dept: LAB | Facility: CLINIC | Age: 86
End: 2021-10-14
Payer: COMMERCIAL

## 2021-10-14 DIAGNOSIS — Z79.01 LONG TERM CURRENT USE OF ANTICOAGULANT THERAPY: Primary | ICD-10-CM

## 2021-10-14 DIAGNOSIS — Z86.711 HISTORY OF PULMONARY EMBOLUS (PE): ICD-10-CM

## 2021-10-14 DIAGNOSIS — Z79.01 LONG TERM CURRENT USE OF ANTICOAGULANT THERAPY: ICD-10-CM

## 2021-10-14 DIAGNOSIS — I48.0 PAROXYSMAL ATRIAL FIBRILLATION (H): ICD-10-CM

## 2021-10-14 LAB — INR BLD: 3 (ref 0.9–1.1)

## 2021-10-14 PROCEDURE — 36416 COLLJ CAPILLARY BLOOD SPEC: CPT

## 2021-10-14 PROCEDURE — 85610 PROTHROMBIN TIME: CPT

## 2021-10-14 NOTE — PROGRESS NOTES
Anticoagulation Management    Unable to reach Ginna today.    Today's INR result of 3.0 is therapeutic (goal INR of 2.0-3.0).  Result received from: Clinic Lab    Follow up required to confirm warfarin dose taken and assess for changes    Left message to continue current dose of warfarin 2 mg tonight.      Anticoagulation clinic to follow up    Annalee Saldaña RN  [phone]

## 2021-10-15 NOTE — PROGRESS NOTES
ANTICOAGULATION MANAGEMENT     Ginna Quiroga 87 year old female is on warfarin with therapeutic INR result. (Goal INR 2.0-3.0)    Recent labs: (last 7 days)     10/14/21  1512   INR 3.0*       ASSESSMENT     Source(s): Chart Review       Warfarin doses taken: Warfarin taken as instructed    Diet: she did increase her veggies and greens back to baseline    New illness, injury, or hospitalization: No    Medication/supplement changes: None noted    Signs or symptoms of bleeding or clotting: No    Previous INR: Supratherapeutic    Additional findings: None     PLAN     Recommended plan for no diet, medication or health factor changes affecting INR     Dosing Instructions: Continue your current warfarin dose with next INR in 2 weeks       Summary  As of 10/14/2021    Full warfarin instructions:  1 mg every Mon, Wed, Fri; 2 mg all other days   Next INR check:  10/28/2021             Telephone call with Ginna who verbalizes understanding and agrees to plan    Patient offered & declined to schedule next visit    Education provided: Goal range and significance of current result    Plan made per [facility] anticoagulation protocol    Kaley Gonzalez RN  Anticoagulation Clinic  10/15/2021    _______________________________________________________________________     Anticoagulation Episode Summary     Current INR goal:  2.0-3.0   TTR:  78.4 % (1 y)   Target end date:     Send INR reminders to:  PATRICIA BLACKBURN    Indications    Long term current use of anticoagulant therapy [Z79.01]           Comments:           Anticoagulation Care Providers     Provider Role Specialty Phone number    Dawson Cloud MD Referring Internal Medicine [phone]

## 2021-10-27 ENCOUNTER — TELEPHONE (OUTPATIENT)
Dept: INTERNAL MEDICINE | Facility: CLINIC | Age: 86
End: 2021-10-27

## 2021-10-27 DIAGNOSIS — I10 ESSENTIAL HYPERTENSION: ICD-10-CM

## 2021-10-27 DIAGNOSIS — M80.00XD AGE-RELATED OSTEOPOROSIS WITH CURRENT PATHOLOGICAL FRACTURE WITH ROUTINE HEALING: ICD-10-CM

## 2021-10-27 NOTE — TELEPHONE ENCOUNTER
10-27-21  Reason for Call:   prescription    Detailed comments: pt called and needs her  alendronate (FOSAMAX) 70 MG tablet   &   enalapril (VASOTEC) 20 MG tablet    Pt uses pharmacy : [facility]    Phone Number Patient can be reached at: Home number on file [phone] (home)    Best Time: anytime    Can we leave a detailed message on this number? YES    Call taken on 10/27/2021 at 9:36 AM by Carmen Betancourt

## 2021-10-28 RX ORDER — ALENDRONATE SODIUM 70 MG/1
70 TABLET ORAL
Qty: 12 TABLET | Refills: 3 | Status: SHIPPED | OUTPATIENT
Start: 2021-10-28 | End: 2021-11-01

## 2021-10-28 RX ORDER — ENALAPRIL MALEATE 20 MG/1
20 TABLET ORAL 2 TIMES DAILY
Qty: 180 TABLET | Refills: 3 | Status: SHIPPED | OUTPATIENT
Start: 2021-10-28 | End: 2022-10-27

## 2021-11-01 DIAGNOSIS — M80.00XD AGE-RELATED OSTEOPOROSIS WITH CURRENT PATHOLOGICAL FRACTURE WITH ROUTINE HEALING: ICD-10-CM

## 2021-11-01 RX ORDER — ALENDRONATE SODIUM 70 MG/1
70 TABLET ORAL
Qty: 12 TABLET | Refills: 3 | Status: SHIPPED | OUTPATIENT
Start: 2021-11-01 | End: 2021-11-08

## 2021-11-01 NOTE — TELEPHONE ENCOUNTER
11-1-21  Aretha in [place] called and wanted to know why pt's:  alendronate (FOSAMAX) 70 MG tablet  Was called into [facility] pharmacy instead what was indicated in phone message of Aretha plasencia Slickmikal molina

## 2021-11-04 ENCOUNTER — LAB (OUTPATIENT)
Dept: LAB | Facility: CLINIC | Age: 86
End: 2021-11-04
Payer: COMMERCIAL

## 2021-11-04 ENCOUNTER — DOCUMENTATION ONLY (OUTPATIENT)
Dept: ANTICOAGULATION | Facility: CLINIC | Age: 86
End: 2021-11-04

## 2021-11-04 ENCOUNTER — ANTICOAGULATION THERAPY VISIT (OUTPATIENT)
Dept: ANTICOAGULATION | Facility: CLINIC | Age: 86
End: 2021-11-04

## 2021-11-04 DIAGNOSIS — I48.0 PAROXYSMAL ATRIAL FIBRILLATION (H): ICD-10-CM

## 2021-11-04 DIAGNOSIS — Z86.711 HISTORY OF PULMONARY EMBOLUS (PE): ICD-10-CM

## 2021-11-04 DIAGNOSIS — Z79.01 LONG TERM CURRENT USE OF ANTICOAGULANT THERAPY: Primary | ICD-10-CM

## 2021-11-04 DIAGNOSIS — Z79.01 LONG TERM CURRENT USE OF ANTICOAGULANT THERAPY: ICD-10-CM

## 2021-11-04 LAB — INR BLD: 3.4 (ref 0.9–1.1)

## 2021-11-04 PROCEDURE — 36416 COLLJ CAPILLARY BLOOD SPEC: CPT

## 2021-11-04 PROCEDURE — 85610 PROTHROMBIN TIME: CPT

## 2021-11-04 NOTE — PROGRESS NOTES
ANTICOAGULATION MANAGEMENT      Ginna Quiroga due for annual renewal of referral to anticoagulation monitoring. Order pended for your review and signature.      ANTICOAGULATION SUMMARY      Warfarin indication(s)     Atrial fibrillation  PE    Heart valve present?  NO       Current goal range   INR: 2.0-3.0     Goal appropriate for indication? Yes, INR 2-3 appropriate for hx of DVT, PE, hypercoagulable state, Afib, LVAD, or bileaflet AVR without risk factors     Current duration of therapy Indefinite/long term therapy   Time in Therapeutic Range (TTR)  (Goal > 60%) 72.6%       Office visit with referring provider's group within last year yes on 9/14/21       Gretta Maxwell RN

## 2021-11-04 NOTE — PROGRESS NOTES
Anticoagulation Management    Unable to reach Ginna today.    Today's INR result of 3.4 is supratherapeutic (goal INR of 2.0-3.0).  Result received from: Clinic Lab    Follow up required to discuss out of range INR     Left message to continue current dose of warfarin 2 mg tonight.      Anticoagulation clinic to follow up    Ava Boggs RN

## 2021-11-05 NOTE — PROGRESS NOTES
ANTICOAGULATION MANAGEMENT     Ginna Quiroga 87 year old female is on warfarin with supratherapeutic INR result. (Goal INR 2.0-3.0)    Recent labs: (last 7 days)     11/04/21  1357   INR 3.4*       ASSESSMENT     Source(s): Chart Review, Patient/Caregiver Call and Template       Warfarin doses taken: Warfarin taken as instructed    Diet: No new diet changes identified    New illness, injury, or hospitalization: No    Medication/supplement changes: None noted    Signs or symptoms of bleeding or clotting: No    Previous INR: Therapeutic last visit; previously outside of goal range    Additional findings: None     PLAN     Recommended plan for no diet, medication or health factor changes affecting INR     Dosing Instructions:  Decrease your warfarin dose (9.1% change) with next INR in 2 weeks       Summary  As of 11/4/2021    Full warfarin instructions:  2 mg every Sun, Tue, Thu; 1 mg all other days   Next INR check:  11/18/2021             Telephone call with Ginna who verbalizes understanding and agrees to plan    Patient offered & declined to schedule next visit    Education provided: Goal range and significance of current result and Importance of therapeutic range    Plan made per [facility] anticoagulation protocol    Gretta Maxwell RN  Anticoagulation Clinic  11/5/2021    _______________________________________________________________________     Anticoagulation Episode Summary     Current INR goal:  2.0-3.0   TTR:  72.6 % (1 y)   Target end date:     Send INR reminders to:  PATRICIA BLACKBURN    Indications    Long term current use of anticoagulant therapy [Z79.01]           Comments:           Anticoagulation Care Providers     Provider Role Specialty Phone number    Dawson Cloud MD Referring Internal Medicine [phone]

## 2021-11-08 ENCOUNTER — TELEPHONE (OUTPATIENT)
Dept: INTERNAL MEDICINE | Facility: CLINIC | Age: 86
End: 2021-11-08
Payer: COMMERCIAL

## 2021-11-08 DIAGNOSIS — I10 ESSENTIAL HYPERTENSION: ICD-10-CM

## 2021-11-08 DIAGNOSIS — M80.00XD AGE-RELATED OSTEOPOROSIS WITH CURRENT PATHOLOGICAL FRACTURE WITH ROUTINE HEALING: ICD-10-CM

## 2021-11-08 RX ORDER — FUROSEMIDE 40 MG
40 TABLET ORAL DAILY
Qty: 90 TABLET | Refills: 3 | Status: SHIPPED | OUTPATIENT
Start: 2021-11-08 | End: 2022-10-18

## 2021-11-08 RX ORDER — ALENDRONATE SODIUM 70 MG/1
70 TABLET ORAL
Qty: 12 TABLET | Refills: 3 | Status: SHIPPED | OUTPATIENT
Start: 2021-11-08 | End: 2022-08-25

## 2021-11-08 NOTE — TELEPHONE ENCOUNTER
..Reason for Call:  Medication or medication refill:    Do you use a [facility]?  Name of the pharmacy and phone number for the current request: [id number] - [place], [place] - [address] AT [facility] [address]  [phone]    Name of the medication requested:   1. alendronate (FOSAMAX) 70 MG tablet  2. furosemide (LASIX) 40 MG tablet    Other request: Patient forgot to call in her medications     Can we leave a detailed message on this number? YES    Phone number patient can be reached at: Home number on file [phone] (home)    Best Time: ANY    Call taken on 11/8/2021 at 10:25 AM by DAVID Zhou

## 2021-11-19 ENCOUNTER — ANTICOAGULATION THERAPY VISIT (OUTPATIENT)
Dept: ANTICOAGULATION | Facility: CLINIC | Age: 86
End: 2021-11-19

## 2021-11-19 ENCOUNTER — LAB (OUTPATIENT)
Dept: LAB | Facility: CLINIC | Age: 86
End: 2021-11-19
Payer: COMMERCIAL

## 2021-11-19 DIAGNOSIS — Z86.711 HISTORY OF PULMONARY EMBOLUS (PE): ICD-10-CM

## 2021-11-19 DIAGNOSIS — Z79.01 LONG TERM CURRENT USE OF ANTICOAGULANT THERAPY: ICD-10-CM

## 2021-11-19 DIAGNOSIS — I48.0 PAROXYSMAL ATRIAL FIBRILLATION (H): ICD-10-CM

## 2021-11-19 DIAGNOSIS — Z79.01 LONG TERM CURRENT USE OF ANTICOAGULANT THERAPY: Primary | ICD-10-CM

## 2021-11-19 LAB — INR BLD: 2.4 (ref 0.9–1.1)

## 2021-11-19 PROCEDURE — 85610 PROTHROMBIN TIME: CPT

## 2021-11-19 PROCEDURE — 36415 COLL VENOUS BLD VENIPUNCTURE: CPT

## 2021-11-19 NOTE — PROGRESS NOTES
ANTICOAGULATION MANAGEMENT     Ginna Quiroga 87 year old female is on warfarin with therapeutic INR result. (Goal INR 2.0-3.0)    Recent labs: (last 7 days)     11/19/21  1537   INR 2.4*       ASSESSMENT     Source(s): Chart Review, Patient/Caregiver Call and Template       Warfarin doses taken: Warfarin taken as instructed    Diet: No new diet changes identified    New illness, injury, or hospitalization: No    Medication/supplement changes: None noted    Signs or symptoms of bleeding or clotting: No    Previous INR: Supratherapeutic    Additional findings: None     PLAN     Recommended plan for no diet, medication or health factor changes affecting INR     Dosing Instructions: Continue your current warfarin dose with next INR in 3 weeks       Summary  As of 11/19/2021    Full warfarin instructions:  2 mg every Sun, Tue, Thu; 1 mg all other days   Next INR check:  12/10/2021             Telephone call with Ginna who agrees to plan and repeated back plan correctly    Patient offered & declined to schedule next visit    Education provided: Goal range and significance of current result and Contact [phone] with any changes, questions or concerns.     Plan made per [facility] anticoagulation protocol    Ava Boggs RN  Anticoagulation Clinic  11/19/2021    _______________________________________________________________________     Anticoagulation Episode Summary     Current INR goal:  2.0-3.0   TTR:  71.0 % (1 y)   Target end date:  Indefinite   Send INR reminders to:  PATRICIA BLACKBURN    Indications    Long term current use of anticoagulant therapy [Z79.01]  Paroxysmal atrial fibrillation (H) [I48.0]  History of pulmonary embolus (PE) [Z86.711]           Comments:           Anticoagulation Care Providers     Provider Role Specialty Phone number    Dawson Cloud MD Referring Internal Medicine [phone]

## 2021-12-17 ENCOUNTER — LAB (OUTPATIENT)
Dept: LAB | Facility: CLINIC | Age: 86
End: 2021-12-17
Payer: COMMERCIAL

## 2021-12-17 ENCOUNTER — ANTICOAGULATION THERAPY VISIT (OUTPATIENT)
Dept: ANTICOAGULATION | Facility: CLINIC | Age: 86
End: 2021-12-17

## 2021-12-17 DIAGNOSIS — I48.0 PAROXYSMAL ATRIAL FIBRILLATION (H): ICD-10-CM

## 2021-12-17 DIAGNOSIS — Z86.711 HISTORY OF PULMONARY EMBOLUS (PE): ICD-10-CM

## 2021-12-17 DIAGNOSIS — Z79.01 LONG TERM CURRENT USE OF ANTICOAGULANT THERAPY: Primary | ICD-10-CM

## 2021-12-17 DIAGNOSIS — Z79.01 LONG TERM CURRENT USE OF ANTICOAGULANT THERAPY: ICD-10-CM

## 2021-12-17 LAB — INR BLD: 4.9 (ref 0.9–1.1)

## 2021-12-17 PROCEDURE — 85610 PROTHROMBIN TIME: CPT

## 2021-12-17 PROCEDURE — 36416 COLLJ CAPILLARY BLOOD SPEC: CPT

## 2021-12-17 NOTE — PROGRESS NOTES
ANTICOAGULATION MANAGEMENT     Ginna Quiroga 87 year old female is on warfarin with supratherapeutic INR result. (Goal INR 2.0-3.0)    Recent labs: (last 7 days)     12/17/21  1113   INR 4.9*       ASSESSMENT     Source(s): Chart Review and Patient/Caregiver Call       Warfarin doses taken: Warfarin taken as instructed    Diet: Covid may be affecting diet and INR    New illness, injury, or hospitalization: Yes: says she had Covid and was not eating well, is now feeling better and diet back to normal    Medication/supplement changes: None noted    Signs or symptoms of bleeding or clotting: No, did confirm, will be seen for active bleeding or head injury    Previous INR: Therapeutic last visit; previously outside of goal range    Additional findings: None     PLAN     Recommended plan for temporary change(s) affecting INR     Dosing Instructions: Hold 2 doses then continue your current warfarin dose with next INR in 7-10 days       Summary  As of 12/17/2021    Full warfarin instructions:  12/17: Hold; 12/18: Hold; Otherwise 2 mg every Sun, Tue, Thu; 1 mg all other days   Next INR check:  12/27/2021             Telephone call with Ginna who verbalizes understanding and agrees to plan    Lab visit scheduled    Education provided: None required    Plan made per [facility] anticoagulation protocol    Viktor Hernandez RN  Anticoagulation Clinic  12/17/2021    _______________________________________________________________________     Anticoagulation Episode Summary     Current INR goal:  2.0-3.0   TTR:  65.2 % (1 y)   Target end date:  Indefinite   Send INR reminders to:  PATRICIA BLACKBURN    Indications    Long term current use of anticoagulant therapy [Z79.01]  Paroxysmal atrial fibrillation (H) [I48.0]  History of pulmonary embolus (PE) [Z86.711]           Comments:           Anticoagulation Care Providers     Provider Role Specialty Phone number    Dawson Cloud MD Referring Internal Medicine [phone]

## 2021-12-27 ENCOUNTER — LAB (OUTPATIENT)
Dept: LAB | Facility: CLINIC | Age: 86
End: 2021-12-27
Payer: COMMERCIAL

## 2021-12-27 ENCOUNTER — ANTICOAGULATION THERAPY VISIT (OUTPATIENT)
Dept: ANTICOAGULATION | Facility: CLINIC | Age: 86
End: 2021-12-27

## 2021-12-27 DIAGNOSIS — Z86.711 HISTORY OF PULMONARY EMBOLUS (PE): ICD-10-CM

## 2021-12-27 DIAGNOSIS — I48.0 PAROXYSMAL ATRIAL FIBRILLATION (H): ICD-10-CM

## 2021-12-27 DIAGNOSIS — Z79.01 LONG TERM CURRENT USE OF ANTICOAGULANT THERAPY: ICD-10-CM

## 2021-12-27 DIAGNOSIS — Z79.01 LONG TERM CURRENT USE OF ANTICOAGULANT THERAPY: Primary | ICD-10-CM

## 2021-12-27 LAB — INR BLD: 3.7 (ref 0.9–1.1)

## 2021-12-27 PROCEDURE — 36415 COLL VENOUS BLD VENIPUNCTURE: CPT

## 2021-12-27 PROCEDURE — 85610 PROTHROMBIN TIME: CPT

## 2021-12-27 NOTE — PROGRESS NOTES
ANTICOAGULATION MANAGEMENT     Ginna Quiroga 87 year old female is on warfarin with supratherapeutic INR result. (Goal INR 2.0-3.0)    Recent labs: (last 7 days)     12/27/21  1033   INR 3.7*       ASSESSMENT     Source(s): Chart Review, Patient/Caregiver Call and Template       Warfarin doses taken: Warfarin taken as instructed    Diet: No new diet changes identified    New illness, injury, or hospitalization: No    Medication/supplement changes: None noted    Signs or symptoms of bleeding or clotting: No    Previous INR: Supratherapeutic    Additional findings: None     PLAN     Recommended plan for no diet, medication or health factor changes affecting INR     Dosing Instructions: Hold dose then Decrease your warfarin dose (10% change) with next INR in 1-2 weeks       Summary  As of 12/27/2021    Full warfarin instructions:  12/27: Hold; Otherwise 2 mg every Sun, Thu; 1 mg all other days   Next INR check:  1/10/2022             Telephone call with Ginna who verbalizes understanding and agrees to plan    Patient offered & declined to schedule next visit    Education provided: Goal range and significance of current result and Importance of therapeutic range    Plan made per [facility] anticoagulation protocol    Gretta Maxwell RN  Anticoagulation Clinic  12/27/2021    _______________________________________________________________________     Anticoagulation Episode Summary     Current INR goal:  2.0-3.0   TTR:  62.7 % (1 y)   Target end date:  Indefinite   Send INR reminders to:  PATRICIA BLACKBURN    Indications    Long term current use of anticoagulant therapy [Z79.01]  Paroxysmal atrial fibrillation (H) [I48.0]  History of pulmonary embolus (PE) [Z86.711]           Comments:           Anticoagulation Care Providers     Provider Role Specialty Phone number    Dawson Cloud MD Referring Internal Medicine [phone]

## 2022-01-11 ENCOUNTER — LAB (OUTPATIENT)
Dept: LAB | Facility: CLINIC | Age: 87
End: 2022-01-11
Payer: COMMERCIAL

## 2022-01-11 ENCOUNTER — ANTICOAGULATION THERAPY VISIT (OUTPATIENT)
Dept: ANTICOAGULATION | Facility: CLINIC | Age: 87
End: 2022-01-11

## 2022-01-11 DIAGNOSIS — I48.0 PAROXYSMAL ATRIAL FIBRILLATION (H): ICD-10-CM

## 2022-01-11 DIAGNOSIS — Z79.01 LONG TERM CURRENT USE OF ANTICOAGULANT THERAPY: ICD-10-CM

## 2022-01-11 DIAGNOSIS — Z86.711 HISTORY OF PULMONARY EMBOLUS (PE): ICD-10-CM

## 2022-01-11 DIAGNOSIS — Z79.01 LONG TERM CURRENT USE OF ANTICOAGULANT THERAPY: Primary | ICD-10-CM

## 2022-01-11 LAB — INR BLD: 3 (ref 0.9–1.1)

## 2022-01-11 PROCEDURE — 36416 COLLJ CAPILLARY BLOOD SPEC: CPT

## 2022-01-11 PROCEDURE — 85610 PROTHROMBIN TIME: CPT

## 2022-01-11 NOTE — PROGRESS NOTES
Anticoagulation Management    Unable to reach Ginna today.    Today's INR result of 3.0 is therapeutic (goal INR of 2.0-3.0).  Result received from: Clinic Lab    Follow up required to confirm warfarin dose taken    Left message to continue current dose of warfarin tonight. Request call back for assessment.      Anticoagulation clinic to follow up    Ava Boggs RN

## 2022-01-12 NOTE — PROGRESS NOTES
ANTICOAGULATION MANAGEMENT     Ginna Quiroga 87 year old female is on warfarin with therapeutic INR result. (Goal INR 2.0-3.0)    Recent labs: (last 7 days)     22  1524   INR 3.0*       ASSESSMENT     Source(s): Chart Review, Patient/Caregiver Call and Template       Warfarin doses taken: More warfarin taken than planned which may be affecting INR    Diet: No new diet changes identified    New illness, injury, or hospitalization: No    Medication/supplement changes: None noted    Signs or symptoms of bleeding or clotting: No    Previous INR: Supratherapeutic    Additional findings: One of her sons  of cancer 1.5 weeks ago.     PLAN     Recommended plan for no diet, medication or health factor changes affecting INR     Dosing Instructions: Continue your current warfarin dose with next INR in 2-3 weeks       Summary  As of 2022    Full warfarin instructions:  2 mg every Sun, e, Thu; 1 mg all other days   Next INR check:  2022             Telephone call with Ginna who verbalizes understanding and agrees to plan and who agrees to plan and repeated back plan correctly    Patient offered & declined to schedule next visit    Education provided: Importance of consistent vitamin K intake, Goal range and significance of current result and Importance of therapeutic range    Plan made per [facility] anticoagulation protocol    Gretta Maxwell RN  Anticoagulation Clinic  2022    _______________________________________________________________________     Anticoagulation Episode Summary     Current INR goal:  2.0-3.0   TTR:  61.8 % (1 y)   Target end date:  Indefinite   Send INR reminders to:  PATRICIA BLACKBURN    Indications    Long term current use of anticoagulant therapy [Z79.01]  Paroxysmal atrial fibrillation (H) [I48.0]  History of pulmonary embolus (PE) [Z86.711]           Comments:           Anticoagulation Care Providers     Provider Role Specialty Phone number    Dawson Cloud MD  [facility] Internal Medicine [phone]

## 2022-01-25 ENCOUNTER — ANTICOAGULATION THERAPY VISIT (OUTPATIENT)
Dept: ANTICOAGULATION | Facility: CLINIC | Age: 87
End: 2022-01-25

## 2022-01-25 ENCOUNTER — LAB (OUTPATIENT)
Dept: LAB | Facility: CLINIC | Age: 87
End: 2022-01-25
Payer: COMMERCIAL

## 2022-01-25 DIAGNOSIS — Z86.711 HISTORY OF PULMONARY EMBOLUS (PE): ICD-10-CM

## 2022-01-25 DIAGNOSIS — I48.0 PAROXYSMAL ATRIAL FIBRILLATION (H): ICD-10-CM

## 2022-01-25 DIAGNOSIS — Z79.01 LONG TERM CURRENT USE OF ANTICOAGULANT THERAPY: Primary | ICD-10-CM

## 2022-01-25 DIAGNOSIS — Z79.01 LONG TERM CURRENT USE OF ANTICOAGULANT THERAPY: ICD-10-CM

## 2022-01-25 LAB — INR BLD: 2.3 (ref 0.9–1.1)

## 2022-01-25 PROCEDURE — 36416 COLLJ CAPILLARY BLOOD SPEC: CPT

## 2022-01-25 PROCEDURE — 85610 PROTHROMBIN TIME: CPT

## 2022-01-25 NOTE — PROGRESS NOTES
Anticoagulation Management    Unable to reach Ginna today.    Today's INR result of 2.3 is therapeutic (goal INR of 2.0-3.0).  Result received from: Clinic Lab    Follow up required to discussed missed dose    Left message to continue current dose of warfarin 2 mg tonight. Request call back for assessment.      Anticoagulation clinic to follow up    Gretta Maxwell RN

## 2022-02-17 ENCOUNTER — ANTICOAGULATION THERAPY VISIT (OUTPATIENT)
Dept: ANTICOAGULATION | Facility: CLINIC | Age: 87
End: 2022-02-17

## 2022-02-17 ENCOUNTER — LAB (OUTPATIENT)
Dept: LAB | Facility: CLINIC | Age: 87
End: 2022-02-17
Payer: COMMERCIAL

## 2022-02-17 DIAGNOSIS — I48.0 PAROXYSMAL ATRIAL FIBRILLATION (H): ICD-10-CM

## 2022-02-17 DIAGNOSIS — Z79.01 LONG TERM CURRENT USE OF ANTICOAGULANT THERAPY: Primary | ICD-10-CM

## 2022-02-17 DIAGNOSIS — Z86.711 HISTORY OF PULMONARY EMBOLUS (PE): ICD-10-CM

## 2022-02-17 DIAGNOSIS — Z79.01 LONG TERM CURRENT USE OF ANTICOAGULANT THERAPY: ICD-10-CM

## 2022-02-17 LAB — INR BLD: 2.6 (ref 0.9–1.1)

## 2022-02-17 PROCEDURE — 85610 PROTHROMBIN TIME: CPT

## 2022-02-17 PROCEDURE — 36416 COLLJ CAPILLARY BLOOD SPEC: CPT

## 2022-02-17 NOTE — PROGRESS NOTES
ANTICOAGULATION MANAGEMENT     Ginna E Junaid 87 year old female is on warfarin with therapeutic INR result. (Goal INR 2.0-3.0)    Recent labs: (last 7 days)     02/17/22  1538   INR 2.6*       ASSESSMENT     Source(s): Chart Review, Patient/Caregiver Call and Template       Warfarin doses taken: Warfarin taken as instructed    Diet: No new diet changes identified    New illness, injury, or hospitalization: No    Medication/supplement changes: None noted    Signs or symptoms of bleeding or clotting: No    Previous INR: Therapeutic last visit; previously outside of goal range    Additional findings: None     PLAN     Recommended plan for no diet, medication or health factor changes affecting INR     Dosing Instructions: Continue your current warfarin dose with next INR in 4 weeks       Summary  As of 2/17/2022    Full warfarin instructions:  2 mg every Sun, Tue, Thu; 1 mg all other days   Next INR check:  3/17/2022             Telephone call with Ginna who verbalizes understanding and agrees to plan    Patient offered & declined to schedule next visit    Education provided: Goal range and significance of current result and Contact [phone] with any changes, questions or concerns.     Plan made per [facility] anticoagulation protocol    Ava Boggs RN  Anticoagulation Clinic  2/17/2022    _______________________________________________________________________     Anticoagulation Episode Summary     Current INR goal:  2.0-3.0   TTR:  63.8 % (1 y)   Target end date:  Indefinite   Send INR reminders to:  PATRICIA BLACKBURN    Indications    Long term current use of anticoagulant therapy [Z79.01]  Paroxysmal atrial fibrillation (H) [I48.0]  History of pulmonary embolus (PE) [Z86.711]           Comments:           Anticoagulation Care Providers     Provider Role Specialty Phone number    Dawson Cloud MD Referring Internal Medicine [phone]

## 2022-03-23 ENCOUNTER — LAB (OUTPATIENT)
Dept: LAB | Facility: CLINIC | Age: 87
End: 2022-03-23
Payer: COMMERCIAL

## 2022-03-23 ENCOUNTER — ANTICOAGULATION THERAPY VISIT (OUTPATIENT)
Dept: ANTICOAGULATION | Facility: CLINIC | Age: 87
End: 2022-03-23

## 2022-03-23 DIAGNOSIS — I48.0 PAROXYSMAL ATRIAL FIBRILLATION (H): ICD-10-CM

## 2022-03-23 DIAGNOSIS — Z86.711 HISTORY OF PULMONARY EMBOLUS (PE): ICD-10-CM

## 2022-03-23 DIAGNOSIS — Z79.01 LONG TERM CURRENT USE OF ANTICOAGULANT THERAPY: ICD-10-CM

## 2022-03-23 DIAGNOSIS — Z79.01 LONG TERM CURRENT USE OF ANTICOAGULANT THERAPY: Primary | ICD-10-CM

## 2022-03-23 LAB — INR BLD: 2.2 (ref 0.9–1.1)

## 2022-03-23 PROCEDURE — 36415 COLL VENOUS BLD VENIPUNCTURE: CPT

## 2022-03-23 PROCEDURE — 85610 PROTHROMBIN TIME: CPT

## 2022-03-23 NOTE — PROGRESS NOTES
ANTICOAGULATION MANAGEMENT     Ginna Quiroga 87 year old female is on warfarin with therapeutic INR result. (Goal INR 2.0-3.0)    Recent labs: (last 7 days)     03/23/22  1347   INR 2.2*       ASSESSMENT       Source(s): Chart Review, Patient/Caregiver Call and Template       Warfarin doses taken: Warfarin taken as instructed    Diet: No new diet changes identified    New illness, injury, or hospitalization: No    Medication/supplement changes: None noted    Signs or symptoms of bleeding or clotting: No    Previous INR: Therapeutic last 2(+) visits    Additional findings: None       PLAN     Recommended plan for no diet, medication or health factor changes affecting INR     Dosing Instructions: Continue your current warfarin dose with next INR in 5 weeks       Summary  As of 3/23/2022    Full warfarin instructions:  2 mg every Sun, Tue, Thu; 1 mg all other days   Next INR check:  4/27/2022             Telephone call with Ginna who verbalizes understanding and agrees to plan    Patient offered & declined to schedule next visit    Education provided: Please call back if any changes to your diet, medications or how you've been taking warfarin and Importance of therapeutic range    Plan made per [facility] anticoagulation protocol    Gretta Maxwell RN  Anticoagulation Clinic  3/23/2022    _______________________________________________________________________     Anticoagulation Episode Summary     Current INR goal:  2.0-3.0   TTR:  71.9 % (1 y)   Target end date:  Indefinite   Send INR reminders to:  PATRICIA BLACKBURN    Indications    Long term current use of anticoagulant therapy [Z79.01]  Paroxysmal atrial fibrillation (H) [I48.0]  History of pulmonary embolus (PE) [Z86.711]           Comments:           Anticoagulation Care Providers     Provider Role Specialty Phone number    Dawson Cloud MD Referring Internal Medicine [phone]

## 2022-04-11 DIAGNOSIS — I10 ESSENTIAL HYPERTENSION: ICD-10-CM

## 2022-04-11 RX ORDER — METOPROLOL SUCCINATE 100 MG/1
TABLET, EXTENDED RELEASE ORAL
Qty: 90 TABLET | Refills: 3 | Status: SHIPPED | OUTPATIENT
Start: 2022-04-11 | End: 2023-03-02

## 2022-04-11 NOTE — TELEPHONE ENCOUNTER
Reason for Call:  Medication or medication refill:    Do you use a [facility]?  Name of the pharmacy and phone number for the current request: [facility] [place], [place] - [address] [place] [address]  [phone]  Name of the medication requested: metoprolol succinate (TOPROL-XL) 100 MG 24 hr tablet    Other request: Le said this is the second request for this medication, please advise     Can we leave a detailed message on this number? YES    Phone number patient can be reached at: Home number on file [phone] (home)    Best Time: any    Call taken on 4/11/2022 at 1:35 PM by Tereza Cruz

## 2022-04-29 ENCOUNTER — LAB (OUTPATIENT)
Dept: LAB | Facility: CLINIC | Age: 87
End: 2022-04-29
Payer: COMMERCIAL

## 2022-04-29 ENCOUNTER — ANTICOAGULATION THERAPY VISIT (OUTPATIENT)
Dept: ANTICOAGULATION | Facility: CLINIC | Age: 87
End: 2022-04-29

## 2022-04-29 DIAGNOSIS — Z79.01 LONG TERM CURRENT USE OF ANTICOAGULANT THERAPY: Primary | ICD-10-CM

## 2022-04-29 DIAGNOSIS — Z79.01 LONG TERM CURRENT USE OF ANTICOAGULANT THERAPY: ICD-10-CM

## 2022-04-29 DIAGNOSIS — Z86.711 HISTORY OF PULMONARY EMBOLUS (PE): ICD-10-CM

## 2022-04-29 DIAGNOSIS — I48.0 PAROXYSMAL ATRIAL FIBRILLATION (H): ICD-10-CM

## 2022-04-29 LAB — INR BLD: 2.2 (ref 0.9–1.1)

## 2022-04-29 PROCEDURE — 85610 PROTHROMBIN TIME: CPT

## 2022-04-29 PROCEDURE — 36416 COLLJ CAPILLARY BLOOD SPEC: CPT

## 2022-04-29 NOTE — PROGRESS NOTES
ANTICOAGULATION MANAGEMENT     Ginna Quiroga 87 year old female is on warfarin with therapeutic INR result. (Goal INR 2.0-3.0)    Recent labs: (last 7 days)     04/29/22  1544   INR 2.2*       ASSESSMENT       Source(s): Chart Review and Template       Warfarin doses taken: Warfarin taken as instructed    Diet: No new diet changes identified    New illness, injury, or hospitalization: No    Medication/supplement changes: None noted    Signs or symptoms of bleeding or clotting: No    Previous INR: Therapeutic last 2(+) visits    Additional findings: None       PLAN     Recommended plan for no diet, medication or health factor changes affecting INR     Dosing Instructions: continue your current warfarin dose with next INR in 4 weeks       Summary  As of 4/29/2022    Full warfarin instructions:  2 mg every Sun, Tue, Thu; 1 mg all other days   Next INR check:  5/27/2022             Detailed voice message left for Ginna with dosing instructions and follow up date.     Contact [phone] to schedule and with any changes, questions or concerns.     Education provided: Please call back if any changes to your diet, medications or how you've been taking warfarin    Plan made per [facility] anticoagulation protocol    Teodora Hopper RN  Anticoagulation Clinic  4/29/2022    _______________________________________________________________________     Anticoagulation Episode Summary     Current INR goal:  2.0-3.0   TTR:  71.9 % (1 y)   Target end date:  Indefinite   Send INR reminders to:  PATRICIA BLACKBURN    Indications    Long term current use of anticoagulant therapy [Z79.01]  Paroxysmal atrial fibrillation (H) [I48.0]  History of pulmonary embolus (PE) [Z86.711]           Comments:           Anticoagulation Care Providers     Provider Role Specialty Phone number    Dawson Cloud MD Referring Internal Medicine [phone]

## 2022-06-03 ENCOUNTER — LAB (OUTPATIENT)
Dept: LAB | Facility: CLINIC | Age: 87
End: 2022-06-03
Payer: COMMERCIAL

## 2022-06-03 ENCOUNTER — ANTICOAGULATION THERAPY VISIT (OUTPATIENT)
Dept: ANTICOAGULATION | Facility: CLINIC | Age: 87
End: 2022-06-03

## 2022-06-03 DIAGNOSIS — Z86.711 HISTORY OF PULMONARY EMBOLUS (PE): ICD-10-CM

## 2022-06-03 DIAGNOSIS — I48.0 PAROXYSMAL ATRIAL FIBRILLATION (H): ICD-10-CM

## 2022-06-03 DIAGNOSIS — Z79.01 LONG TERM CURRENT USE OF ANTICOAGULANT THERAPY: Primary | ICD-10-CM

## 2022-06-03 DIAGNOSIS — Z79.01 LONG TERM CURRENT USE OF ANTICOAGULANT THERAPY: ICD-10-CM

## 2022-06-03 LAB — INR BLD: 2.3 (ref 0.9–1.1)

## 2022-06-03 PROCEDURE — 85610 PROTHROMBIN TIME: CPT

## 2022-06-03 PROCEDURE — 36415 COLL VENOUS BLD VENIPUNCTURE: CPT

## 2022-06-03 NOTE — PROGRESS NOTES
ANTICOAGULATION MANAGEMENT     Ginna Quiroga 87 year old female is on warfarin with therapeutic INR result. (Goal INR 2.0-3.0)    Recent labs: (last 7 days)     06/03/22  1544   INR 2.3*       ASSESSMENT       Source(s): Chart Review, Patient/Caregiver Call and Template       Warfarin doses taken: Warfarin taken as instructed    Diet: No new diet changes identified    New illness, injury, or hospitalization: No    Medication/supplement changes: None noted    Signs or symptoms of bleeding or clotting: No    Previous INR: Therapeutic last 2(+) visits    Additional findings: None       PLAN     Recommended plan for no diet, medication or health factor changes affecting INR     Dosing Instructions: continue your current warfarin dose with next INR in 6 weeks       Summary  As of 6/3/2022    Full warfarin instructions:  2 mg every Sun, Tue, Thu; 1 mg all other days   Next INR check:  7/15/2022             Telephone call with Ginna who verbalizes understanding and agrees to plan    Patient offered & declined to schedule next visit    Education provided: Please call back if any changes to your diet, medications or how you've been taking warfarin and Importance of therapeutic range    Plan made per [facility] anticoagulation protocol    Gretta Maxwell RN  Anticoagulation Clinic  6/3/2022    _______________________________________________________________________     Anticoagulation Episode Summary     Current INR goal:  2.0-3.0   TTR:  71.9 % (1 y)   Target end date:  Indefinite   Send INR reminders to:  PATRICIA BLACKBURN    Indications    Long term current use of anticoagulant therapy [Z79.01]  Paroxysmal atrial fibrillation (H) [I48.0]  History of pulmonary embolus (PE) [Z86.711]           Comments:           Anticoagulation Care Providers     Provider Role Specialty Phone number    Dawson Cloud MD Referring Internal Medicine [phone]

## 2022-07-07 ENCOUNTER — TRANSFERRED RECORDS (OUTPATIENT)
Dept: HEALTH INFORMATION MANAGEMENT | Facility: CLINIC | Age: 87
End: 2022-07-07

## 2022-07-18 ENCOUNTER — LAB (OUTPATIENT)
Dept: LAB | Facility: CLINIC | Age: 87
End: 2022-07-18
Payer: COMMERCIAL

## 2022-07-18 ENCOUNTER — ANTICOAGULATION THERAPY VISIT (OUTPATIENT)
Dept: ANTICOAGULATION | Facility: CLINIC | Age: 87
End: 2022-07-18

## 2022-07-18 DIAGNOSIS — Z79.01 LONG TERM CURRENT USE OF ANTICOAGULANT THERAPY: ICD-10-CM

## 2022-07-18 DIAGNOSIS — Z79.01 LONG TERM CURRENT USE OF ANTICOAGULANT THERAPY: Primary | ICD-10-CM

## 2022-07-18 DIAGNOSIS — I48.0 PAROXYSMAL ATRIAL FIBRILLATION (H): ICD-10-CM

## 2022-07-18 DIAGNOSIS — Z86.711 HISTORY OF PULMONARY EMBOLUS (PE): ICD-10-CM

## 2022-07-18 LAB — INR BLD: 1.8 (ref 0.9–1.1)

## 2022-07-18 PROCEDURE — 36416 COLLJ CAPILLARY BLOOD SPEC: CPT

## 2022-07-18 PROCEDURE — 85610 PROTHROMBIN TIME: CPT

## 2022-07-18 NOTE — PROGRESS NOTES
ANTICOAGULATION MANAGEMENT     Ginna SCHNEIDER Junaid 87 year old female is on warfarin with subtherapeutic INR result. (Goal INR 2.0-3.0)    Recent labs: (last 7 days)     07/18/22  1606   INR 1.8*       ASSESSMENT       Source(s): Chart Review and Patient/Caregiver Call       Warfarin doses taken: Warfarin taken as instructed    Diet: Was on vacation all last week with a little different diet may be affecting diet and INR    New illness, injury, or hospitalization: No    Medication/supplement changes: None noted    Signs or symptoms of bleeding or clotting: No    Previous INR: Therapeutic last 2(+) visits    Additional findings: None       PLAN     Recommended plan for temporary change(s) affecting INR     Dosing Instructions: continue your current warfarin dose with next INR in 2 weeks       Summary  As of 7/18/2022    Full warfarin instructions:  2 mg every Sun, Tue, Thu; 1 mg all other days   Next INR check:  8/1/2022             Telephone call with Ginna who verbalizes understanding and agrees to plan    Patient offered & declined to schedule next visit    Education provided: Goal range and significance of current result, Importance of following up at instructed interval and Contact [phone] with any changes, questions or concerns.     Plan made per [facility] anticoagulation protocol    Teodora Hopper RN  Anticoagulation Clinic  7/18/2022    _______________________________________________________________________     Anticoagulation Episode Summary     Current INR goal:  2.0-3.0   TTR:  67.0 % (1 y)   Target end date:  Indefinite   Send INR reminders to:  PATRICIA BLACKBURN    Indications    Long term current use of anticoagulant therapy [Z79.01]  Paroxysmal atrial fibrillation (H) [I48.0]  History of pulmonary embolus (PE) [Z86.711]           Comments:           Anticoagulation Care Providers     Provider Role Specialty Phone number    Dawson Cloud MD Referring Internal Medicine [phone]

## 2022-08-04 ENCOUNTER — LAB (OUTPATIENT)
Dept: LAB | Facility: CLINIC | Age: 87
End: 2022-08-04
Payer: COMMERCIAL

## 2022-08-04 ENCOUNTER — ANTICOAGULATION THERAPY VISIT (OUTPATIENT)
Dept: ANTICOAGULATION | Facility: CLINIC | Age: 87
End: 2022-08-04

## 2022-08-04 DIAGNOSIS — Z79.01 LONG TERM CURRENT USE OF ANTICOAGULANT THERAPY: Primary | ICD-10-CM

## 2022-08-04 DIAGNOSIS — I48.0 PAROXYSMAL ATRIAL FIBRILLATION (H): ICD-10-CM

## 2022-08-04 DIAGNOSIS — Z86.711 HISTORY OF PULMONARY EMBOLUS (PE): ICD-10-CM

## 2022-08-04 DIAGNOSIS — Z79.01 LONG TERM CURRENT USE OF ANTICOAGULANT THERAPY: ICD-10-CM

## 2022-08-04 LAB — INR BLD: 2.3 (ref 0.9–1.1)

## 2022-08-04 PROCEDURE — 36416 COLLJ CAPILLARY BLOOD SPEC: CPT

## 2022-08-04 PROCEDURE — 85610 PROTHROMBIN TIME: CPT

## 2022-08-04 NOTE — PROGRESS NOTES
ANTICOAGULATION MANAGEMENT     Ginna E Junaid 87 year old female is on warfarin with therapeutic INR result. (Goal INR 2.0-3.0)    Recent labs: (last 7 days)     08/04/22  1544   INR 2.3*       ASSESSMENT       Source(s): Chart Review and Patient/Caregiver Call       Warfarin doses taken: Warfarin taken as instructed    Diet: No new diet changes identified    New illness, injury, or hospitalization: No    Medication/supplement changes: None noted    Signs or symptoms of bleeding or clotting: No    Previous INR: Subtherapeutic    Additional findings: None       PLAN     Recommended plan for no diet, medication or health factor changes affecting INR     Dosing Instructions: Continue your current warfarin dose with next INR in 3 weeks       Summary  As of 8/4/2022    Full warfarin instructions:  2 mg every Sun, Tue, Thu; 1 mg all other days   Next INR check:  8/25/2022             Telephone call with Ginna who verbalizes understanding and agrees to plan    Patient offered & declined to schedule next visit    Education provided: Please call back if any changes to your diet, medications or how you've been taking warfarin and Importance of therapeutic range    Plan made per [facility] anticoagulation protocol    Gretta Maxwell RN  Anticoagulation Clinic  8/4/2022    _______________________________________________________________________     Anticoagulation Episode Summary     Current INR goal:  2.0-3.0   TTR:  65.2 % (1 y)   Target end date:  Indefinite   Send INR reminders to:  PATRICIA BLACKBURN    Indications    Long term current use of anticoagulant therapy [Z79.01]  Paroxysmal atrial fibrillation (H) [I48.0]  History of pulmonary embolus (PE) [Z86.711]           Comments:           Anticoagulation Care Providers     Provider Role Specialty Phone number    Dawson Cloud MD Referring Internal Medicine [phone]

## 2022-08-23 DIAGNOSIS — M80.00XD AGE-RELATED OSTEOPOROSIS WITH CURRENT PATHOLOGICAL FRACTURE WITH ROUTINE HEALING: ICD-10-CM

## 2022-08-25 RX ORDER — ALENDRONATE SODIUM 70 MG/1
TABLET ORAL
Qty: 12 TABLET | Refills: 0 | Status: SHIPPED | OUTPATIENT
Start: 2022-08-25 | End: 2023-02-23

## 2022-08-25 NOTE — TELEPHONE ENCOUNTER
"Last Written Prescription Date:  11/8/21  Last Fill Quantity: 12,  # refills: 3   Last office visit provider:  9/14/21     Requested Prescriptions   Pending Prescriptions Disp Refills     alendronate (FOSAMAX) 70 MG tablet [Pharmacy Med Name: ALENDRONATE SODIUM 70MG TABLET] 12 tablet 3     Sig: TAKE ONE (1) TABLET (70 MG) BY MOUTH EVERY 7 DAYS       Bisphosphonates Passed - 8/25/2022  8:31 AM        Passed - Recent (12 mo) or future (30 days) visit within the authorizing provider's specialty     Patient has had an office visit with the authorizing provider or a provider within the authorizing providers department within the previous 12 mos or has a future within next 30 days. See \"Patient Info\" tab in inbasket, or \"Choose Columns\" in Meds & Orders section of the refill encounter.              Passed - Dexa on file within past 2 years     Please review last Dexa result.           Passed - Medication is active on med list        Passed - Patient is age 18 or older        Passed - Normal serum creatinine on file within past 12 months     Recent Labs   Lab Test 09/14/21  1201   CR 1.07       Ok to refill medication if creatinine is low               Julia Carter, RN 08/25/22 8:31 AM  "

## 2022-08-30 ENCOUNTER — LAB (OUTPATIENT)
Dept: LAB | Facility: CLINIC | Age: 87
End: 2022-08-30
Payer: COMMERCIAL

## 2022-08-30 ENCOUNTER — ANTICOAGULATION THERAPY VISIT (OUTPATIENT)
Dept: ANTICOAGULATION | Facility: CLINIC | Age: 87
End: 2022-08-30

## 2022-08-30 DIAGNOSIS — I48.0 PAROXYSMAL ATRIAL FIBRILLATION (H): ICD-10-CM

## 2022-08-30 DIAGNOSIS — Z79.01 LONG TERM CURRENT USE OF ANTICOAGULANT THERAPY: ICD-10-CM

## 2022-08-30 DIAGNOSIS — Z86.711 HISTORY OF PULMONARY EMBOLUS (PE): ICD-10-CM

## 2022-08-30 DIAGNOSIS — Z79.01 LONG TERM CURRENT USE OF ANTICOAGULANT THERAPY: Primary | ICD-10-CM

## 2022-08-30 LAB — INR BLD: 2.5 (ref 0.9–1.1)

## 2022-08-30 PROCEDURE — 85610 PROTHROMBIN TIME: CPT

## 2022-08-30 PROCEDURE — 36416 COLLJ CAPILLARY BLOOD SPEC: CPT

## 2022-08-30 NOTE — PROGRESS NOTES
ANTICOAGULATION MANAGEMENT     Ginna Quiroga 87 year old female is on warfarin with therapeutic INR result. (Goal INR 2.0-3.0)    Recent labs: (last 7 days)     08/30/22  1447   INR 2.5*       ASSESSMENT       Source(s): Chart Review    Previous INR was Therapeutic last visit; previously outside of goal range    Medication, diet, health changes since last INR chart reviewed; none identified           PLAN     Recommended plan for no diet, medication or health factor changes affecting INR     Dosing Instructions: Continue your current warfarin dose with next INR in 4 weeks       Summary  As of 8/30/2022    Full warfarin instructions:  2 mg every Sun, Tue, Thu; 1 mg all other days   Next INR check:  9/27/2022             Telephone call with Ginna who verbalizes understanding and agrees to plan    Patient offered & declined to schedule next visit    Education provided: Goal range and significance of current result and Contact [phone] with any changes, questions or concerns.     Plan made per [facility] anticoagulation protocol    Bailey Edgar RN  Anticoagulation Clinic  8/30/2022    _______________________________________________________________________     Anticoagulation Episode Summary     Current INR goal:  2.0-3.0   TTR:  65.1 % (1 y)   Target end date:  Indefinite   Send INR reminders to:  PATRICIA BLACKBURN    Indications    Long term current use of anticoagulant therapy [Z79.01]  Paroxysmal atrial fibrillation (H) [I48.0]  History of pulmonary embolus (PE) [Z86.711]           Comments:           Anticoagulation Care Providers     Provider Role Specialty Phone number    Dawson Cloud MD Referring Internal Medicine [phone]

## 2022-10-03 ENCOUNTER — LAB (OUTPATIENT)
Dept: LAB | Facility: CLINIC | Age: 87
End: 2022-10-03
Payer: COMMERCIAL

## 2022-10-03 ENCOUNTER — ANTICOAGULATION THERAPY VISIT (OUTPATIENT)
Dept: ANTICOAGULATION | Facility: CLINIC | Age: 87
End: 2022-10-03

## 2022-10-03 DIAGNOSIS — Z79.01 LONG TERM CURRENT USE OF ANTICOAGULANT THERAPY: ICD-10-CM

## 2022-10-03 DIAGNOSIS — Z86.711 HISTORY OF PULMONARY EMBOLUS (PE): ICD-10-CM

## 2022-10-03 DIAGNOSIS — Z79.01 LONG TERM CURRENT USE OF ANTICOAGULANT THERAPY: Primary | ICD-10-CM

## 2022-10-03 DIAGNOSIS — I48.0 PAROXYSMAL ATRIAL FIBRILLATION (H): ICD-10-CM

## 2022-10-03 LAB — INR BLD: 2.3 (ref 0.9–1.1)

## 2022-10-03 PROCEDURE — 85610 PROTHROMBIN TIME: CPT

## 2022-10-03 PROCEDURE — 36416 COLLJ CAPILLARY BLOOD SPEC: CPT

## 2022-10-03 NOTE — PROGRESS NOTES
ANTICOAGULATION MANAGEMENT     Ginna Quiroga 87 year old female is on warfarin with therapeutic INR result. (Goal INR 2.0-3.0)    Recent labs: (last 7 days)     10/03/22  1438   INR 2.3*       ASSESSMENT       Source(s): Chart Review, Patient/Caregiver Call and Template       Warfarin doses taken: Warfarin taken as instructed    Diet: No new diet changes identified    New illness, injury, or hospitalization: No    Medication/supplement changes: None noted    Signs or symptoms of bleeding or clotting: No    Previous INR: Therapeutic last 2(+) visits    Additional findings: None       PLAN     Recommended plan for no diet, medication or health factor changes affecting INR     Dosing Instructions: Continue your current warfarin dose with next INR in 5 weeks       Summary  As of 10/3/2022    Full warfarin instructions:  2 mg every Sun, Tue, Thu; 1 mg all other days   Next INR check:  11/7/2022             Telephone call with Ginna who verbalizes understanding and agrees to plan    Patient offered & declined to schedule next visit    Education provided: Contact [phone] with any changes, questions or concerns.     Plan made per [facility] anticoagulation protocol    Ava Boggs RN  Anticoagulation Clinic  10/3/2022    _______________________________________________________________________     Anticoagulation Episode Summary     Current INR goal:  2.0-3.0   TTR:  70.0 % (1 y)   Target end date:  Indefinite   Send INR reminders to:  PATRICIA BLACKBURN    Indications    Long term current use of anticoagulant therapy [Z79.01]  Paroxysmal atrial fibrillation (H) [I48.0]  History of pulmonary embolus (PE) [Z86.711]           Comments:           Anticoagulation Care Providers     Provider Role Specialty Phone number    Dawson Cloud MD Referring Internal Medicine [phone]

## 2022-10-11 ENCOUNTER — DOCUMENTATION ONLY (OUTPATIENT)
Dept: ANTICOAGULATION | Facility: CLINIC | Age: 87
End: 2022-10-11

## 2022-10-11 DIAGNOSIS — Z86.711 HISTORY OF PULMONARY EMBOLUS (PE): ICD-10-CM

## 2022-10-11 DIAGNOSIS — I48.0 PAROXYSMAL ATRIAL FIBRILLATION (H): Primary | ICD-10-CM

## 2022-10-11 NOTE — PROGRESS NOTES
ANTICOAGULATION CLINIC REFERRAL RENEWAL REQUEST       An annual renewal order is required for all patients referred to [facility].?  Please review and sign the pended referral order for Ginna Quiroga.       ANTICOAGULATION SUMMARY      Warfarin indication(s)   Atrial Fibrillation and PE    Mechanical heart valve present?  NO       Current goal range   INR: 2.0-3.0     Goal appropriate for indication? Goal INR 2-3, standard for indication(s) above     Time in Therapeutic Range (TTR)  (Goal > 60%) 70.0%       Office visit with referring provider's group within last year no on 9/14/21       Ava Boggs RN  [facility]

## 2022-10-18 DIAGNOSIS — I10 ESSENTIAL HYPERTENSION: ICD-10-CM

## 2022-10-18 RX ORDER — FUROSEMIDE 40 MG
TABLET ORAL
Qty: 90 TABLET | OUTPATIENT
Start: 2022-10-18

## 2022-11-01 ENCOUNTER — TELEPHONE (OUTPATIENT)
Dept: NURSING | Facility: CLINIC | Age: 87
End: 2022-11-01

## 2022-11-01 DIAGNOSIS — I10 ESSENTIAL HYPERTENSION: ICD-10-CM

## 2022-11-01 RX ORDER — FUROSEMIDE 40 MG
40 TABLET ORAL DAILY
Qty: 90 TABLET | Refills: 0 | Status: SHIPPED | OUTPATIENT
Start: 2022-11-01 | End: 2022-11-23

## 2022-11-01 NOTE — TELEPHONE ENCOUNTER
Nurse Triage SBAR    Situation:   Refill    Background:   Patient calling.    Assessment:   -She was asked to make an appointment in order to refill her Lasix,  a 30 day script was sent.  -She has scheduled and appointment on December 23rd, which was the soonest available.    Recommendation:   As appropriate please send for an additional 60 day supply of lasix to bridge her until this appointment.    NORMAN MCGILL RN on 11/1/2022 at 3:16 PM

## 2022-11-08 ENCOUNTER — LAB (OUTPATIENT)
Dept: LAB | Facility: CLINIC | Age: 87
End: 2022-11-08
Payer: COMMERCIAL

## 2022-11-08 ENCOUNTER — ANTICOAGULATION THERAPY VISIT (OUTPATIENT)
Dept: ANTICOAGULATION | Facility: CLINIC | Age: 87
End: 2022-11-08

## 2022-11-08 DIAGNOSIS — Z86.711 HISTORY OF PULMONARY EMBOLUS (PE): ICD-10-CM

## 2022-11-08 DIAGNOSIS — I48.0 PAROXYSMAL ATRIAL FIBRILLATION (H): ICD-10-CM

## 2022-11-08 DIAGNOSIS — Z79.01 LONG TERM CURRENT USE OF ANTICOAGULANT THERAPY: Primary | ICD-10-CM

## 2022-11-08 LAB — INR BLD: 2.5 (ref 0.9–1.1)

## 2022-11-08 PROCEDURE — 36416 COLLJ CAPILLARY BLOOD SPEC: CPT

## 2022-11-08 PROCEDURE — 85610 PROTHROMBIN TIME: CPT

## 2022-11-08 NOTE — PROGRESS NOTES
ANTICOAGULATION MANAGEMENT     Ginna Quiroga 88 year old female is on warfarin with therapeutic INR result. (Goal INR 2.0-3.0)    Recent labs: (last 7 days)     11/08/22  1435   INR 2.5*       ASSESSMENT       Source(s): Chart Review    Previous INR was Therapeutic last 2(+) visits    Medication, diet, health changes since last INR chart reviewed; none identified           PLAN     Recommended plan for no diet, medication or health factor changes affecting INR     Dosing Instructions: Continue your current warfarin dose with next INR in 6 weeks       Summary  As of 11/8/2022    Full warfarin instructions:  2 mg every Sun, Tue, Thu; 1 mg all other days; Starting 11/8/2022   Next INR check:  12/20/2022             Detailed voice message left for Ginna with dosing instructions and follow up date.     Contact [phone] to schedule and with any changes, questions or concerns.     Education provided:     Goal range and lab monitoring: goal range and significance of current result    Contact [phone] with any changes, questions or concerns.     Plan made per [facility] anticoagulation protocol    Teodora Hopper RN  Anticoagulation Clinic  11/8/2022    _______________________________________________________________________     Anticoagulation Episode Summary     Current INR goal:  2.0-3.0   TTR:  79.8 % (1 y)   Target end date:  Indefinite   Send INR reminders to:  PATRICIA BLACKBURN    Indications    Long term current use of anticoagulant therapy [Z79.01]  Paroxysmal atrial fibrillation (H) [I48.0]  History of pulmonary embolus (PE) [Z86.711]           Comments:           Anticoagulation Care Providers     Provider Role Specialty Phone number    Dawson Cloud MD Referring Internal Medicine [phone]

## 2022-11-22 DIAGNOSIS — I10 ESSENTIAL HYPERTENSION: ICD-10-CM

## 2022-11-23 RX ORDER — FUROSEMIDE 40 MG
TABLET ORAL
Qty: 90 TABLET | Refills: 0 | Status: SHIPPED | OUTPATIENT
Start: 2022-11-23 | End: 2023-02-22

## 2022-11-23 NOTE — TELEPHONE ENCOUNTER
"Routing refill request to provider for review/approval because:  Labs not current:  Multiple  Patient needs to be seen because it has been more than 1 year since last office visit.  BP not current  Early refill requested.    Last Written Prescription Date:  11/1/22  Last Fill Quantity: 90,  # refills: 0   Last office visit provider:  9/14/21     Requested Prescriptions   Pending Prescriptions Disp Refills     furosemide (LASIX) 40 MG tablet [Pharmacy Med Name: FUROSEMIDE 40MG TABLETS] 30 tablet      Sig: TAKE 1 TABLET(40MG) BY MOUTH DAILY. OVERDUE FOR FOLLOW UP APPOINTEMENT. PLEASE SCHEDULE YOUR ANNUAL WELLNESS VIST       Diuretics (Including Combos) Protocol Failed - 11/23/2022 11:23 AM        Failed - Blood pressure under 140/90 in past 12 months     BP Readings from Last 3 Encounters:   09/14/21 (!) 156/90   03/18/21 138/80   11/09/20 132/80                 Failed - Recent (12 mo) or future (30 days) visit within the authorizing provider's specialty     Patient has had an office visit with the authorizing provider or a provider within the authorizing providers department within the previous 12 mos or has a future within next 30 days. See \"Patient Info\" tab in inbasket, or \"Choose Columns\" in Meds & Orders section of the refill encounter.              Failed - Normal serum creatinine on file in past 12 months     Recent Labs   Lab Test 09/14/21  1201   CR 1.07              Failed - Normal serum potassium on file in past 12 months     Recent Labs   Lab Test 09/14/21  1201   POTASSIUM 3.9                    Failed - Normal serum sodium on file in past 12 months     Recent Labs   Lab Test 09/14/21  1201                 Passed - Medication is active on med list        Passed - Patient is age 18 or older        Passed - No active pregancy on record        Passed - No positive pregnancy test in past 12 months             Pee Alba RN 11/23/22 11:23 AM  "

## 2022-12-20 ENCOUNTER — LAB (OUTPATIENT)
Dept: LAB | Facility: CLINIC | Age: 87
End: 2022-12-20
Payer: COMMERCIAL

## 2022-12-20 ENCOUNTER — ANTICOAGULATION THERAPY VISIT (OUTPATIENT)
Dept: ANTICOAGULATION | Facility: CLINIC | Age: 87
End: 2022-12-20

## 2022-12-20 DIAGNOSIS — I48.0 PAROXYSMAL ATRIAL FIBRILLATION (H): ICD-10-CM

## 2022-12-20 DIAGNOSIS — Z86.711 HISTORY OF PULMONARY EMBOLUS (PE): ICD-10-CM

## 2022-12-20 DIAGNOSIS — Z79.01 LONG TERM CURRENT USE OF ANTICOAGULANT THERAPY: Primary | ICD-10-CM

## 2022-12-20 LAB — INR BLD: 3.1 (ref 0.9–1.1)

## 2022-12-20 PROCEDURE — 36415 COLL VENOUS BLD VENIPUNCTURE: CPT

## 2022-12-20 PROCEDURE — 85610 PROTHROMBIN TIME: CPT

## 2022-12-20 NOTE — PROGRESS NOTES
ANTICOAGULATION MANAGEMENT     Ginna WYATT Junaid 88 year old female is on warfarin with supratherapeutic INR result. (Goal INR 2.0-3.0)    Recent labs: (last 7 days)     12/20/22  1520   INR 3.1*       ASSESSMENT       Source(s): Chart Review and Patient/Caregiver Call       Warfarin doses taken: Warfarin taken as instructed    Diet: Decreased greens/vitamin K in diet; plans to resume previous intake    New illness, injury, or hospitalization: No    Medication/supplement changes: None noted    Signs or symptoms of bleeding or clotting: No    Previous INR: Therapeutic last 2(+) visits    Additional findings: None       PLAN     Recommended plan for temporary change(s) affecting INR     Dosing Instructions: Continue your current warfarin dose with next INR in 2 weeks       Summary  As of 12/20/2022    Full warfarin instructions:  2 mg every Sun, Tue, Thu; 1 mg all other days; Starting 12/20/2022   Next INR check:  1/3/2023             Telephone call with Ginna who verbalizes understanding and agrees to plan    Patient offered & declined to schedule next visit    Education provided:     Please call back if any changes to your diet, medications or how you've been taking warfarin    Goal range and lab monitoring: goal range and significance of current result and Importance of therapeutic range    Plan made per [facility] anticoagulation protocol    Gretta Maxwell RN  Anticoagulation Clinic  12/20/2022    _______________________________________________________________________     Anticoagulation Episode Summary     Current INR goal:  2.0-3.0   TTR:  85.1 % (1 y)   Target end date:  Indefinite   Send INR reminders to:  PATRICIA BLACKBURN    Indications    Long term current use of anticoagulant therapy [Z79.01]  Paroxysmal atrial fibrillation (H) [I48.0]  History of pulmonary embolus (PE) [Z86.711]           Comments:           Anticoagulation Care Providers     Provider Role Specialty Phone number    Dawson Cloud MD  [facility] Internal Medicine [phone]

## 2022-12-23 ENCOUNTER — OFFICE VISIT (OUTPATIENT)
Dept: INTERNAL MEDICINE | Facility: CLINIC | Age: 87
End: 2022-12-23
Payer: COMMERCIAL

## 2022-12-23 VITALS
HEIGHT: 65 IN | RESPIRATION RATE: 18 BRPM | TEMPERATURE: 98.3 F | BODY MASS INDEX: 26.33 KG/M2 | SYSTOLIC BLOOD PRESSURE: 144 MMHG | DIASTOLIC BLOOD PRESSURE: 80 MMHG | WEIGHT: 158.06 LBS | HEART RATE: 68 BPM

## 2022-12-23 DIAGNOSIS — E83.42 HYPOMAGNESEMIA: ICD-10-CM

## 2022-12-23 DIAGNOSIS — H40.10X0 OPEN-ANGLE GLAUCOMA OF BOTH EYES, UNSPECIFIED GLAUCOMA STAGE, UNSPECIFIED OPEN-ANGLE GLAUCOMA TYPE: ICD-10-CM

## 2022-12-23 DIAGNOSIS — I10 ESSENTIAL HYPERTENSION: ICD-10-CM

## 2022-12-23 DIAGNOSIS — I49.5 TACHY-BRADY SYNDROME (H): ICD-10-CM

## 2022-12-23 DIAGNOSIS — Z00.00 ENCOUNTER FOR MEDICARE ANNUAL WELLNESS EXAM: Primary | ICD-10-CM

## 2022-12-23 DIAGNOSIS — M15.0 PRIMARY OSTEOARTHRITIS INVOLVING MULTIPLE JOINTS: ICD-10-CM

## 2022-12-23 DIAGNOSIS — Z95.0 STATUS POST BIVENTRICULAR CARDIAC PACEMAKER INSERTION: ICD-10-CM

## 2022-12-23 DIAGNOSIS — I48.0 PAROXYSMAL ATRIAL FIBRILLATION (H): ICD-10-CM

## 2022-12-23 DIAGNOSIS — G31.84 MILD COGNITIVE IMPAIRMENT: ICD-10-CM

## 2022-12-23 DIAGNOSIS — L98.9 SKIN LESION: ICD-10-CM

## 2022-12-23 DIAGNOSIS — R60.0 PERIPHERAL EDEMA: ICD-10-CM

## 2022-12-23 DIAGNOSIS — M81.0 AGE-RELATED OSTEOPOROSIS WITHOUT CURRENT PATHOLOGICAL FRACTURE: ICD-10-CM

## 2022-12-23 DIAGNOSIS — Z86.711 HISTORY OF PULMONARY EMBOLUS (PE): ICD-10-CM

## 2022-12-23 DIAGNOSIS — R35.0 URINARY FREQUENCY: ICD-10-CM

## 2022-12-23 DIAGNOSIS — E55.9 VITAMIN D DEFICIENCY: ICD-10-CM

## 2022-12-23 DIAGNOSIS — N18.31 STAGE 3A CHRONIC KIDNEY DISEASE (H): ICD-10-CM

## 2022-12-23 PROBLEM — M79.672 LEFT FOOT PAIN: Status: RESOLVED | Noted: 2019-01-29 | Resolved: 2022-12-23

## 2022-12-23 LAB
ALBUMIN SERPL BCG-MCNC: 4.3 G/DL (ref 3.5–5.2)
ALBUMIN UR-MCNC: NEGATIVE MG/DL
ALP SERPL-CCNC: 114 U/L (ref 35–104)
ALT SERPL W P-5'-P-CCNC: 27 U/L (ref 10–35)
ANION GAP SERPL CALCULATED.3IONS-SCNC: 14 MMOL/L (ref 7–15)
APPEARANCE UR: CLEAR
AST SERPL W P-5'-P-CCNC: 32 U/L (ref 10–35)
BILIRUB SERPL-MCNC: 0.6 MG/DL
BILIRUB UR QL STRIP: NEGATIVE
BUN SERPL-MCNC: 26.2 MG/DL (ref 8–23)
CALCIUM SERPL-MCNC: 9.7 MG/DL (ref 8.8–10.2)
CHLORIDE SERPL-SCNC: 102 MMOL/L (ref 98–107)
CHOLEST SERPL-MCNC: 174 MG/DL
COLOR UR AUTO: YELLOW
CREAT SERPL-MCNC: 1.02 MG/DL (ref 0.51–0.95)
DEPRECATED HCO3 PLAS-SCNC: 28 MMOL/L (ref 22–29)
ERYTHROCYTE [DISTWIDTH] IN BLOOD BY AUTOMATED COUNT: 14 % (ref 10–15)
GFR SERPL CREATININE-BSD FRML MDRD: 53 ML/MIN/1.73M2
GLUCOSE SERPL-MCNC: 96 MG/DL (ref 70–99)
GLUCOSE UR STRIP-MCNC: NEGATIVE MG/DL
HCT VFR BLD AUTO: 41.8 % (ref 35–47)
HDLC SERPL-MCNC: 56 MG/DL
HGB BLD-MCNC: 13.4 G/DL (ref 11.7–15.7)
HGB UR QL STRIP: ABNORMAL
KETONES UR STRIP-MCNC: NEGATIVE MG/DL
LDLC SERPL CALC-MCNC: 100 MG/DL
LEUKOCYTE ESTERASE UR QL STRIP: NEGATIVE
MAGNESIUM SERPL-MCNC: 1.9 MG/DL (ref 1.7–2.3)
MCH RBC QN AUTO: 28.2 PG (ref 26.5–33)
MCHC RBC AUTO-ENTMCNC: 32.1 G/DL (ref 31.5–36.5)
MCV RBC AUTO: 88 FL (ref 78–100)
NITRATE UR QL: NEGATIVE
NONHDLC SERPL-MCNC: 118 MG/DL
PH UR STRIP: 7 [PH] (ref 5–8)
PLATELET # BLD AUTO: 259 10E3/UL (ref 150–450)
POTASSIUM SERPL-SCNC: 3.4 MMOL/L (ref 3.4–5.3)
PROT SERPL-MCNC: 7.9 G/DL (ref 6.4–8.3)
RBC # BLD AUTO: 4.76 10E6/UL (ref 3.8–5.2)
RBC #/AREA URNS AUTO: NORMAL /HPF
SODIUM SERPL-SCNC: 144 MMOL/L (ref 136–145)
SP GR UR STRIP: 1.02 (ref 1–1.03)
TRIGL SERPL-MCNC: 88 MG/DL
TSH SERPL DL<=0.005 MIU/L-ACNC: 1.97 UIU/ML (ref 0.3–4.2)
UROBILINOGEN UR STRIP-ACNC: 0.2 E.U./DL
VIT B12 SERPL-MCNC: 863 PG/ML (ref 232–1245)
WBC # BLD AUTO: 8.4 10E3/UL (ref 4–11)
WBC #/AREA URNS AUTO: NORMAL /HPF

## 2022-12-23 PROCEDURE — 36415 COLL VENOUS BLD VENIPUNCTURE: CPT | Performed by: INTERNAL MEDICINE

## 2022-12-23 PROCEDURE — 82306 VITAMIN D 25 HYDROXY: CPT | Performed by: INTERNAL MEDICINE

## 2022-12-23 PROCEDURE — G0439 PPPS, SUBSEQ VISIT: HCPCS | Performed by: INTERNAL MEDICINE

## 2022-12-23 PROCEDURE — 82607 VITAMIN B-12: CPT | Performed by: INTERNAL MEDICINE

## 2022-12-23 PROCEDURE — 84443 ASSAY THYROID STIM HORMONE: CPT | Performed by: INTERNAL MEDICINE

## 2022-12-23 PROCEDURE — 90662 IIV NO PRSV INCREASED AG IM: CPT | Performed by: INTERNAL MEDICINE

## 2022-12-23 PROCEDURE — 99215 OFFICE O/P EST HI 40 MIN: CPT | Mod: 25 | Performed by: INTERNAL MEDICINE

## 2022-12-23 PROCEDURE — 81001 URINALYSIS AUTO W/SCOPE: CPT | Performed by: INTERNAL MEDICINE

## 2022-12-23 PROCEDURE — G0008 ADMIN INFLUENZA VIRUS VAC: HCPCS | Performed by: INTERNAL MEDICINE

## 2022-12-23 PROCEDURE — 83735 ASSAY OF MAGNESIUM: CPT | Performed by: INTERNAL MEDICINE

## 2022-12-23 PROCEDURE — 91312 COVID-19 VACCINE BIVALENT BOOSTER 12+ (PFIZER): CPT | Performed by: INTERNAL MEDICINE

## 2022-12-23 PROCEDURE — 80053 COMPREHEN METABOLIC PANEL: CPT | Performed by: INTERNAL MEDICINE

## 2022-12-23 PROCEDURE — 0124A COVID-19 VACCINE BIVALENT BOOSTER 12+ (PFIZER): CPT | Performed by: INTERNAL MEDICINE

## 2022-12-23 PROCEDURE — 80061 LIPID PANEL: CPT | Performed by: INTERNAL MEDICINE

## 2022-12-23 PROCEDURE — 85027 COMPLETE CBC AUTOMATED: CPT | Performed by: INTERNAL MEDICINE

## 2022-12-23 RX ORDER — VALSARTAN 160 MG/1
160 TABLET ORAL DAILY
Qty: 90 TABLET | Refills: 3 | Status: SHIPPED | OUTPATIENT
Start: 2022-12-23 | End: 2023-12-18

## 2022-12-23 ASSESSMENT — ENCOUNTER SYMPTOMS
HEARTBURN: 0
EYE PAIN: 0
NAUSEA: 0
FREQUENCY: 1
DIZZINESS: 0
PARESTHESIAS: 0
FEVER: 0
JOINT SWELLING: 0
SHORTNESS OF BREATH: 1
NERVOUS/ANXIOUS: 1
MYALGIAS: 1
HEMATOCHEZIA: 0
HEADACHES: 0
PALPITATIONS: 0
HEMATURIA: 0
WEAKNESS: 1
CONSTIPATION: 0
SORE THROAT: 0
ABDOMINAL PAIN: 0
DYSURIA: 0
COUGH: 0
ARTHRALGIAS: 1
DIARRHEA: 0
CHILLS: 1

## 2022-12-23 ASSESSMENT — ACTIVITIES OF DAILY LIVING (ADL): CURRENT_FUNCTION: TRANSPORTATION REQUIRES ASSISTANCE

## 2022-12-23 NOTE — PATIENT INSTRUCTIONS
Annual flu shot every fall    He should get a tetanus vaccine updated in the next year.  You can get a Td vaccine at your pharmacy    I would also recommend getting the shingles vaccine, Shingrix.  This can be obtained at your pharmacy as well    You should consider continuing to get an annual mammogram completed for breast cancer screening    Continue to see your eye doctor every 6 months    I would like you to see a dermatologist to have the lesion on your arm evaluated.  You can schedule an appointment at [facility] [place] by calling [phone]    Please recheck your blood pressure at home several times over the next month and let me know if it is still running over 140 on the top number      Patient Education   Personalized Prevention Plan  You are due for the preventive services outlined below.  Your care team is available to assist you in scheduling these services.  If you have already completed any of these items, please share that information with your care team to update in your medical record.  Health Maintenance Due   Topic Date Due    Zoster (Shingles) Vaccine (2 of 3) 02/26/2009    COVID-19 Vaccine (4 - Booster for Pfizer series) 03/16/2022    Flu Vaccine (1) 09/01/2022    Basic Metabolic Panel  09/14/2022    Cholesterol Lab  09/14/2022    Diptheria Tetanus Pertussis (DTAP/TDAP/TD) Vaccine (2 - Td or Tdap) 01/01/2023       Exercise for a Healthier Heart  You may wonder how you can improve the health of your heart. If you re thinking about exercise, you re on the right track. You don t need to become an athlete. But you do need a certain amount of brisk exercise to help strengthen your heart. If you have been diagnosed with a heart condition, your healthcare provider may advise exercise to help stabilize your condition. To help make exercise a habit, choose safe, fun activities.      Exercise with a friend. When activity is fun, you're more likely to stick with it.   Before you  start  Check with your healthcare provider before starting an exercise program. This is especially important if you have not been active for a while. It's also important if you have a long-term (chronic) health problem such as heart disease, diabetes, or obesity. Or if you are at high risk for having these problems.   Why exercise?  Exercising regularly offers many healthy rewards. It can help you do all of the following:   Improve your blood cholesterol level to help prevent further heart trouble  Lower your blood pressure to help prevent a stroke or heart attack  Control diabetes, or reduce your risk of getting this disease  Improve your heart and lung function  Reach and stay at a healthy weight  Make your muscles stronger so you can stay active  Prevent falls and fractures by slowing the loss of bone mass (osteoporosis)  Manage stress better  Reduce your blood pressure  Improve your sense of self and your body image  Exercise tips    Ease into your routine. Set small goals. Then build on them. If you are not sure what your activity level should be, talk with your healthcare provider first before starting an exercise routine.  Exercise on most days. Aim for a total of 150 minutes (2 hours and 30 minutes) or more of moderate-intensity aerobic activity each week. Or 75 minutes (1 hour and 15 minutes) or more of vigorous-intensity aerobic activity each week. Or try for a combination of both. Moderate activity means that you breathe heavier and your heart rate increases but you can still talk. Think about doing 40 minutes of moderate exercise, 3 to 4 times a week. For best results, activity should last for about 40 minutes to lower blood pressure and cholesterol. It's OK to work up to the 40-minute period over time. Examples of moderate-intensity activity are walking 1 mile in 15 minutes. Or doing 30 to 45 minutes of yard work.  Step up your daily activity level.  Along with your exercise program, try being more  active the whole day. Walk instead of drive. Or park further away so that you take more steps each day. Do more household tasks or yard work. You may not be able to meet the advised mount of physical activity. But doing some moderate- or vigorous-intensity aerobic activity can help reduce your risk for heart disease. Your healthcare provider can help you figure out what is best for you.  Choose 1 or more activities you enjoy.  Walking is one of the easiest things you can do. You can also try swimming, riding a bike, dancing, or taking an exercise class.    When to call your healthcare provider  Call your healthcare provider if you have any of these:   Chest pain or feel dizzy or lightheaded  Burning, tightness, pressure, or heaviness in your chest, neck, shoulders, back, or arms  Abnormal shortness of breath  More joint or muscle pain  A very fast or irregular heartbeat (palpitations)  [employer] last reviewed this educational content on 7/1/2019 2000-2021 [employer]. All rights reserved. This information is not intended as a substitute for professional medical care. Always follow your healthcare professional's instructions.          Preventing Falls at Home  A person can fall for many reasons. Older adults may fall because reaction time slows down as we age. Your muscles and joints may get stiff, weak, or less flexible because of illness, medicines, or a physical condition.   Other health problems that make falls more likely include:   Arthritis  Dizziness or lightheadedness when you stand up (orthostatic hypotension)  History of a stroke  Dizziness  Anemia  Certain medicines taken for mental illness or to control blood pressure.  Problems with balance or gait  Bladder or urinary problems  History of falling  Changes in vision (vision impairment)  Changes in thinking skills and memory (cognitive impairment)  Injuries from a fall can include serious injuries such as broken bones, dislocated joints,  internal bleeding and cuts. Injuries like these can limit your independence.   Prevention tips  To help prevent falls and fall-related injuries, follow the tips below.    Floors  To make floors safer:   Put nonskid pads under area rugs.  Remove small rugs.  Replace worn floor coverings.  Tack carpets firmly to each step on carpeted stairs. Put nonskid strips on the edges of uncarpeted stairs.  Keep floors and stairs free of clutter and cords.  Arrange furniture so there are clear pathways.  Clean up any spills right away.  Bathrooms    To make bathrooms safer:   Install grab bars in the tub or shower.  Apply nonskid strips or put a nonskid rubber mat in the tub or shower.  Sit on a bath chair to bathe.  Use bathmats with nonskid backing.  Lighting  To improve visibility in your home:    Keep a flashlight in each room. Or put a lamp next to the bed within easy reach.  Put nightlights in the bedrooms, hallways, kitchen, and bathrooms.  Make sure all stairways have good lighting.  Take your time when going up and down stairs.  Put handrails on both sides of stairs and in walkways for more support. To prevent injury to your wrist or arm, don t use handrails to pull yourself up.  Install grab bars to pull yourself up.  Move or rearrange items that you use often. This will make them easier to find or reach.  Look at your home to find any safety hazards. Especially look at doorways, walkways, and the driveway. Remove or repair any safety problems that you find.  Other changes to make  Look around to find any safety hazards. Look closely at doorways, walkways, and the driveway. Remove or repair any safety problems that you find.  Wear shoes that fit well.  Take your time when going up and down stairs.  Put handrails on both sides of stairs and in walkways for more support. To prevent injury to your wrist or arm, don t use handrails to pull yourself up.  Install grab bars wherever needed to pull yourself up.  Arrange items  that you use often. This will make them easier to find or reach.    [employer] last reviewed this educational content on 3/1/2020    9775-6100 [employer]. All rights reserved. This information is not intended as a substitute for professional medical care. Always follow your healthcare professional's instructions.

## 2022-12-23 NOTE — LETTER
December 27, 2022      Ginna Quiroga  [address]  [place] [address]        Dear Ginna,    Resulted Orders   CBC with platelets   Result Value Ref Range    WBC Count 8.4 4.0 - 11.0 10e3/uL    RBC Count 4.76 3.80 - 5.20 10e6/uL    Hemoglobin 13.4 11.7 - 15.7 g/dL    Hematocrit 41.8 35.0 - 47.0 %    MCV 88 78 - 100 fL    MCH 28.2 26.5 - 33.0 pg    MCHC 32.1 31.5 - 36.5 g/dL    RDW 14.0 10.0 - 15.0 %    Platelet Count 259 150 - 450 10e3/uL   Comprehensive metabolic panel (BMP + Alb, Alk Phos, ALT, AST, Total. Bili, TP)   Result Value Ref Range    Sodium 144 136 - 145 mmol/L    Potassium 3.4 3.4 - 5.3 mmol/L    Chloride 102 98 - 107 mmol/L    Carbon Dioxide (CO2) 28 22 - 29 mmol/L    Anion Gap 14 7 - 15 mmol/L    Urea Nitrogen 26.2 (H) 8.0 - 23.0 mg/dL    Creatinine 1.02 (H) 0.51 - 0.95 mg/dL    Calcium 9.7 8.8 - 10.2 mg/dL    Glucose 96 70 - 99 mg/dL    Alkaline Phosphatase 114 (H) 35 - 104 U/L    AST 32 10 - 35 U/L    ALT 27 10 - 35 U/L    Protein Total 7.9 6.4 - 8.3 g/dL    Albumin 4.3 3.5 - 5.2 g/dL    Bilirubin Total 0.6 <=1.2 mg/dL    GFR Estimate 53 (L) >60 mL/min/1.73m2      Comment:      Effective December 21, 2021 eGFRcr in adults is calculated using the 2021 CKD-EPI creatinine equation which includes age and gender (Alma et al., NEJM, DOI: 10.1056/ZKOAfo6154603)   Lipid Profile (Chol, Trig, HDL, LDL calc)   Result Value Ref Range    Cholesterol 174 <200 mg/dL    Triglycerides 88 <150 mg/dL    Direct Measure HDL 56 >=50 mg/dL    LDL Cholesterol Calculated 100 <=100 mg/dL    Non HDL Cholesterol 118 <130 mg/dL    Narrative    Cholesterol  Desirable:  <200 mg/dL    Triglycerides  Normal:  Less than 150 mg/dL  Borderline High:  150-199 mg/dL  High:  200-499 mg/dL  Very High:  Greater than or equal to 500 mg/dL    Direct Measure HDL  Female:  Greater than or equal to 50 mg/dL   Male:  Greater than or equal to 40 mg/dL    LDL Cholesterol  Desirable:  <100mg/dL  Above Desirable:  100-129 mg/dL   Borderline  High:  130-159 mg/dL   High:  160-189 mg/dL   Very High:  >= 190 mg/dL    Non HDL Cholesterol  Desirable:  130 mg/dL  Above Desirable:  130-159 mg/dL  Borderline High:  160-189 mg/dL  High:  190-219 mg/dL  Very High:  Greater than or equal to 220 mg/dL   Vitamin D deficiency screening   Result Value Ref Range    Vitamin D, Total (25-Hydroxy) 44 20 - 75 ug/L    Narrative    Season, race, dietary intake, and treatment affect the concentration of 25-hydroxy-Vitamin D. Values may decrease during winter months and increase during summer months. Values 20-29 ug/L may indicate Vitamin D insufficiency and values <20 ug/L may indicate Vitamin D deficiency.    Vitamin D determination is routinely performed by an immunoassay specific for 25 hydroxyvitamin D3.  If an individual is on vitamin D2(ergocalciferol) supplementation, please specify 25 OH vitamin D2 and D3 level determination by LCMSMS test [id number].     TSH with free T4 reflex   Result Value Ref Range    TSH 1.97 0.30 - 4.20 uIU/mL   Vitamin B12   Result Value Ref Range    Vitamin B12 863 232 - 1,245 pg/mL   Magnesium   Result Value Ref Range    Magnesium 1.9 1.7 - 2.3 mg/dL   UA Macro with Reflex to Micro and Culture - lab collect   Result Value Ref Range    Color Urine Yellow Colorless, Straw, Light Yellow, Yellow    Appearance Urine Clear Clear    Glucose Urine Negative Negative mg/dL    Bilirubin Urine Negative Negative    Ketones Urine Negative Negative mg/dL    Specific Gravity Urine 1.020 1.005 - 1.030    Blood Urine Trace (A) Negative    pH Urine 7.0 5.0 - 8.0    Protein Albumin Urine Negative Negative mg/dL    Urobilinogen Urine 0.2 0.2, 1.0 E.U./dL    Nitrite Urine Negative Negative    Leukocyte Esterase Urine Negative Negative   Urine Microscopic   Result Value Ref Range    RBC Urine None Seen 0-2 /HPF /HPF    WBC Urine None Seen 0-5 /HPF /HPF    Narrative    Urine Culture not indicated       Your kidney function remains mildly decreased but is stable over  the past year.  Normal liver studies.  No diabetes.  No anemia.  Vitamin D level looks good.  Normal thyroid function.  Normal B12 level.  Cholesterol is well controlled.    As your blood pressure was not adequately controlled and since I started you on a new medication, I would actually like you to schedule a follow-up appointment with me in 6 weeks.  You can still monitor your blood pressure at home but I will want you to come into the office so that I can check as well.  I will also need to recheck some labs at that visit as your potassium is on the low end of normal.    If you have any questions or concerns, please call the clinic at the number listed above.       Sincerely,      Dawson Cloud MD

## 2022-12-23 NOTE — PROGRESS NOTES
She is at risk for lack of exercise and has been provided with information to increase physical activity for the benefit of her well-being.  She is at risk for falling and has been provided with information to reduce the risk of falling at home.

## 2022-12-23 NOTE — PROGRESS NOTES
"SUBJECTIVE:   Ginna is a 88 year old who presents for Preventive Visit.    [id number] woman here for annual wellness visit and to follow-up multiple chronic medical problems including paroxysmal atrial fibrillation with tachybradycardia syndrome, hypertension, history of pulmonary embolus, and new concerns regarding cognitive impairment.  See assessment plan for details.    Patient has been advised of split billing requirements and indicates understanding: Yes  Are you in the first 12 months of your Medicare coverage?  No      67 minutes spent in the management of this patient including reviewing records, obtaining history, performing exam, ordering appropriate tests and documenting visit.    20 of those minutes spent addressing wellness and 47 minutes spent addressing chronic and new medical problems.  See assessment plan for details.    Healthy Habits:     In general, how would you rate your overall health?  Excellent    Frequency of exercise:  1 day/week    Duration of exercise:  Less than 15 minutes    Do you usually eat at least 4 servings of fruit and vegetables a day, include whole grains    & fiber and avoid regularly eating high fat or \"junk\" foods?  Yes    Taking medications regularly:  Yes    Medication side effects:  Muscle aches    Ability to successfully perform activities of daily living:  Transportation requires assistance    Home Safety:  No safety concerns identified    Hearing Impairment:  No hearing concerns    In the past 6 months, have you been bothered by leaking of urine? Yes    In general, how would you rate your overall mental or emotional health?  Good      PHQ-2 Total Score: 1    Additional concerns today:  No      Have you ever done Advance Care Planning? (For example, a Health Directive, POLST, or a discussion with a medical provider or your loved ones about your wishes): No, advance care planning information given to patient to review.  Patient declined advance care planning " discussion at this time.       Fall risk  Fallen 2 or more times in the past year?: No  Any fall with injury in the past year?: Yes    Cognitive Screening   1) Repeat 3 items (Leader, Season, Table)    2) Clock draw: ABNORMAL    3) 3 item recall: Recalls 1 object   Results: ABNORMAL clock, 1-2 items recalled: PROBABLE COGNITIVE IMPAIRMENT, **INFORM PROVIDER**    Mini-CogTM Copyright AMMY Alarcon. Licensed by the author for use in [facility]; reprinted with permission (naresh@[facility]). All rights reserved.          Reviewed and updated as needed this visit by clinical staff   Tobacco  Allergies               Reviewed and updated as needed this visit by Provider                 Social History     Tobacco Use     Smoking status: Never     Smokeless tobacco: Never   Substance Use Topics     Alcohol use: Yes     Comment: 0-1/day     If you drink alcohol do you typically have >3 drinks per day or >7 drinks per week? No    Alcohol Use 12/23/2022   Prescreen: >3 drinks/day or >7 drinks/week? No   Prescreen: >3 drinks/day or >7 drinks/week? -   No flowsheet data found.            Current providers sharing in care for this patient include:   Patient Care Team:  Dawson Cloud MD as PCP - General (Internal Medicine)  Dawson Cloud MD as Assigned PCP    The following health maintenance items are reviewed in Epic and correct as of today:  Health Maintenance   Topic Date Due     MICROALBUMIN  Never done     ANNUAL REVIEW OF  ORDERS  Never done     ZOSTER IMMUNIZATION (2 of 3) 02/26/2009     COVID-19 Vaccine (4 - Booster for Pfizer series) 03/16/2022     INFLUENZA VACCINE (1) 09/01/2022     MEDICARE ANNUAL WELLNESS VISIT  09/14/2022     BMP  09/14/2022     LIPID  09/14/2022     HEMOGLOBIN  09/14/2022     DTAP/TDAP/TD IMMUNIZATION (2 - Td or Tdap) 01/01/2023     FALL RISK ASSESSMENT  12/23/2023     ADVANCE CARE PLANNING  09/14/2026     PHQ-2 (once per calendar year)  Completed     Pneumococcal Vaccine: 65+  "Years  Completed     URINALYSIS  Completed     IPV IMMUNIZATION  Aged Out     MENINGITIS IMMUNIZATION  Aged Out     Lab work is in process        Pertinent mammograms are reviewed under the imaging tab.    Review of Systems   Constitutional: Positive for chills. Negative for fever.   HENT: Positive for hearing loss. Negative for congestion, ear pain and sore throat.    Eyes: Positive for visual disturbance. Negative for pain.   Respiratory: Positive for shortness of breath. Negative for cough.    Cardiovascular: Positive for peripheral edema. Negative for chest pain and palpitations.   Gastrointestinal: Negative for abdominal pain, constipation, diarrhea, heartburn, hematochezia and nausea.   Genitourinary: Positive for frequency and urgency. Negative for dysuria, genital sores, hematuria, pelvic pain and vaginal bleeding.   Musculoskeletal: Positive for arthralgias and myalgias. Negative for joint swelling.   Skin: Positive for rash.   Neurological: Positive for weakness. Negative for dizziness, headaches and paresthesias.   Psychiatric/Behavioral: Negative for mood changes. The patient is nervous/anxious.          OBJECTIVE:   Pulse 68   Resp 18   Ht 1.651 m (5' 5\")   Wt 71.7 kg (158 lb 1 oz)   BMI 26.30 kg/m   Estimated body mass index is 26.3 kg/m  as calculated from the following:    Height as of this encounter: 1.651 m (5' 5\").    Weight as of this encounter: 71.7 kg (158 lb 1 oz).  Physical Exam  EYES: Eyelids, conjunctiva, and sclera were normal. Pupils were normal. Cornea, iris, and lens were normal bilaterally.  HEAD, EARS, NOSE, MOUTH, AND THROAT: Head and face were normal. TMs and external auditory canals are normal  NECK: Neck appearance was normal. There were no neck masses and the thyroid was not enlarged and no nodules are felt.  No lymphadenopathy.  RESPIRATORY: Breathing pattern was normal and the chest moved symmetrically.   Lung sounds were normal and there were no rales or " wheezes.  CARDIOVASCULAR: Heart rate and rhythm were normal.  S1 and S2 were normal and there were no extra sounds or murmurs. Peripheral pulses in arms and legs were normal.  Trace bilateral lower extremity edema.  Good pedal pulses.  BREAST: No palpable masses or tenderness.  No axillary nodes.  GASTROINTESTINAL:  Bowel sounds were present.   Palpation detected no tenderness, mass, or enlarged organs.   MUSCULOSKELETAL: Skeletal configuration was normal and muscle mass was normal for age. Joint appearance was overall normal.  LYMPHATIC: There were no enlarged nodes.  SKIN/HAIR/NAILS: Skin color was normal.  Nonspecific erythematous lesion on left arm.  NEUROLOGIC: The patient was alert and oriented to person, place, time, and circumstance. Speech was normal. Cranial nerves were normal. Motor strength was normal for age. The patient was normally coordinated.  Sensation intact.  PSYCHIATRIC:  Mood and affect were normal         ASSESSMENT / PLAN:   1. Encounter for Medicare annual wellness exam  Immunizations are reviewed and providing COVID booster and flu shot today.  Recommending Td and Shingrix at her pharmacy. Living will discussed.  Non-smoker.  Uses alcohol in moderation.  Regular exercise and good diet habits to maintain a healthy weight discussed.  Further colonoscopies are not recommended at her age and with her comorbidities as risks outweigh benefit.  We discussed continuing annual mammogram for breast cancer screening.   Last DEXA was 2021. Dementia and depression screening completed.  Recommending annual eye exam.  Recommending seeing a dentist every 6 months.  Skin exam performed and recommending regular use of sunblock.   Will screen for diabetes with fasting glucose.  Checking fasting lipid profile.     2. Tachy-kayleen syndrome (H)  History of paroxysmal atrial fibrillation with ablation and subsequent placement of permanent pacemaker    3. Paroxysmal atrial fibrillation (H)  Continues chronic  anticoagulation with warfarin.  She continues on metoprolol  mg daily    4. Age-related osteoporosis without current pathological fracture  History of pelvic fracture.  She is tolerating Fosamax 70 mg weekly and will continue for a total of 5 years.  Began November 2020.  Maintaining good calcium and vitamin D intake.  Encouraging more weightbearing exercise.  DEXA can be repeated after completing 5 years of treatment.    5. Mild cognitive impairment  She struggled with her mini cog evaluation today remembering only 1 word and not being able to draw a clock correctly.  Her son confirms that there is some cognitive decline.  In the least she has mild cognitive impairment and she may be having early dementia.  Fortunately, she lives with her son.  He reports that she is safely managing in her home environment.  Obtain vitamin B12 level and TSH to further evaluate.  We discussed medication such as Aricept but she is not too interested in starting at this time.  Her son is instructed to contact me if her condition starts to worsen.  - TSH with free T4 reflex; Future  - Vitamin B12; Future  - TSH with free T4 reflex  - Vitamin B12    6. Stage 3a chronic kidney disease (H)  Will monitor renal function.  She should avoid NSAIDs.  May be contributing to elevated blood pressure seen today.  - CBC with platelets; Future  - Comprehensive metabolic panel (BMP + Alb, Alk Phos, ALT, AST, Total. Bili, TP); Future  - CBC with platelets  - Comprehensive metabolic panel (BMP + Alb, Alk Phos, ALT, AST, Total. Bili, TP)    7. Essential hypertension  Blood pressure is mildly elevated despite taking metoprolol, aspirin enalapril, and furosemide.  I am recommending discontinuing enalapril which was increased to 20 mg twice daily last year and begin valsartan 160 mg daily.  She will recheck her blood pressure at home and will let me know if she is finding readings over 140/90.  - CBC with platelets; Future  - Comprehensive metabolic  "panel (BMP + Alb, Alk Phos, ALT, AST, Total. Bili, TP); Future  - Lipid Profile (Chol, Trig, HDL, LDL calc); Future  - TSH with free T4 reflex; Future  - valsartan (DIOVAN) 160 MG tablet; Take 1 tablet (160 mg) by mouth daily  Dispense: 90 tablet; Refill: 3  - CBC with platelets  - Comprehensive metabolic panel (BMP + Alb, Alk Phos, ALT, AST, Total. Bili, TP)  - Lipid Profile (Chol, Trig, HDL, LDL calc)  - TSH with free T4 reflex  - Urine Microscopic    8. Status post biventricular cardiac pacemaker insertion  Interrogation every 3 months    9. History of pulmonary embolus (PE)  Chronically anticoagulated with warfarin    10. Peripheral edema  Well controlled with current dose of diuretic    11. Vitamin D deficiency  Recheck vitamin D on replacement  - Vitamin D deficiency screening; Future  - Vitamin D deficiency screening    12. Primary osteoarthritis involving multiple joints  Stable using acetaminophen    13. Open-angle glaucoma of both eyes, unspecified glaucoma stage, unspecified open-angle glaucoma type  Followed by ophthalmology    14. Hypomagnesemia  Monitor electrolytes while on diuretic  - Magnesium; Future  - Magnesium    15. Skin lesion  Nonspecific erythematous lesion on arm.  Cannot exclude squamous cell skin cancer and I am recommending scheduling appointment with dermatology    16. Urinary frequency    - UA Macro with Reflex to Micro and Culture - lab collect; Future  - UA Macro with Reflex to Micro and Culture - lab collect    Patient has been advised of split billing requirements and indicates understanding: Yes          BMI:   Estimated body mass index is 26.3 kg/m  as calculated from the following:    Height as of this encounter: 1.651 m (5' 5\").    Weight as of this encounter: 71.7 kg (158 lb 1 oz).         She reports that she has never smoked. She has never used smokeless tobacco.      Appropriate preventive services were discussed with this patient, including applicable screening as " appropriate for cardiovascular disease, diabetes, osteopenia/osteoporosis, and glaucoma.  As appropriate for age/gender, discussed screening for colorectal cancer, prostate cancer, breast cancer, and cervical cancer. Checklist reviewing preventive services available has been given to the patient.    Reviewed patients plan of care and provided an AVS. The Basic Care Plan (routine screening as documented in Health Maintenance) for Ginna meets the Care Plan requirement. This Care Plan has been established and reviewed with the Patient.          Dawson Cloud MD  [facility]    Identified Health Risks:    She is at risk for lack of exercise and has been provided with information to increase physical activity for the benefit of her well-being.  She is at risk for falling and has been provided with information to reduce the risk of falling at home.

## 2022-12-24 LAB — DEPRECATED CALCIDIOL+CALCIFEROL SERPL-MC: 44 UG/L (ref 20–75)

## 2023-01-10 ENCOUNTER — ANTICOAGULATION THERAPY VISIT (OUTPATIENT)
Dept: ANTICOAGULATION | Facility: CLINIC | Age: 88
End: 2023-01-10

## 2023-01-10 ENCOUNTER — LAB (OUTPATIENT)
Dept: LAB | Facility: CLINIC | Age: 88
End: 2023-01-10
Payer: COMMERCIAL

## 2023-01-10 DIAGNOSIS — Z86.711 HISTORY OF PULMONARY EMBOLUS (PE): ICD-10-CM

## 2023-01-10 DIAGNOSIS — Z79.01 LONG TERM CURRENT USE OF ANTICOAGULANT THERAPY: Primary | ICD-10-CM

## 2023-01-10 DIAGNOSIS — I10 ESSENTIAL HYPERTENSION: ICD-10-CM

## 2023-01-10 DIAGNOSIS — I48.0 PAROXYSMAL ATRIAL FIBRILLATION (H): ICD-10-CM

## 2023-01-10 LAB — INR BLD: 3.2 (ref 0.9–1.1)

## 2023-01-10 PROCEDURE — 36415 COLL VENOUS BLD VENIPUNCTURE: CPT

## 2023-01-10 PROCEDURE — 85610 PROTHROMBIN TIME: CPT

## 2023-01-10 NOTE — PROGRESS NOTES
ANTICOAGULATION MANAGEMENT     Ginna WYATT Junaid 88 year old female is on warfarin with supratherapeutic INR result. (Goal INR 2.0-3.0)    Recent labs: (last 7 days)     01/10/23  1553   INR 3.2*       ASSESSMENT       Source(s): Chart Review and Patient/Caregiver Call       Warfarin doses taken: Warfarin taken as instructed    Diet: No new diet changes identified    New illness, injury, or hospitalization: No    Medication/supplement changes: None noted    Signs or symptoms of bleeding or clotting: No    Previous INR: Supratherapeutic    Additional findings: None       PLAN     Recommended plan for no diet, medication or health factor changes affecting INR     Dosing Instructions: decrease your warfarin dose (10% change) with next INR in 2 weeks       Summary  As of 1/10/2023    Full warfarin instructions:  2 mg every Sun, Thu; 1 mg all other days   Next INR check:  1/24/2023             Telephone call with Ginna who verbalizes understanding and agrees to plan and who agrees to plan and repeated back plan correctly    Patient offered & declined to schedule next visit    Education provided:     Goal range and lab monitoring: goal range and significance of current result and Importance of following up at instructed interval    Contact [phone] with any changes, questions or concerns.     Plan made per [facility] anticoagulation protocol    Teodora Hopper RN  Anticoagulation Clinic  1/10/2023    _______________________________________________________________________     Anticoagulation Episode Summary     Current INR goal:  2.0-3.0   TTR:  85.1 % (1 y)   Target end date:  Indefinite   Send INR reminders to:  PATRICIA BLACKBURN    Indications    Long term current use of anticoagulant therapy [Z79.01]  Paroxysmal atrial fibrillation (H) [I48.0]  History of pulmonary embolus (PE) [Z86.711]           Comments:           Anticoagulation Care Providers     Provider Role Specialty Phone number    Dawson Cloud MD  [facility] Internal Medicine [phone]

## 2023-01-11 RX ORDER — METOPROLOL SUCCINATE 100 MG/1
TABLET, EXTENDED RELEASE ORAL
Qty: 90 TABLET | Refills: 3 | OUTPATIENT
Start: 2023-01-11

## 2023-01-27 ENCOUNTER — ANTICOAGULATION THERAPY VISIT (OUTPATIENT)
Dept: ANTICOAGULATION | Facility: CLINIC | Age: 88
End: 2023-01-27

## 2023-01-27 ENCOUNTER — LAB (OUTPATIENT)
Dept: LAB | Facility: CLINIC | Age: 88
End: 2023-01-27
Payer: COMMERCIAL

## 2023-01-27 DIAGNOSIS — Z79.01 LONG TERM CURRENT USE OF ANTICOAGULANT THERAPY: Primary | ICD-10-CM

## 2023-01-27 DIAGNOSIS — I48.0 PAROXYSMAL ATRIAL FIBRILLATION (H): ICD-10-CM

## 2023-01-27 DIAGNOSIS — Z86.711 HISTORY OF PULMONARY EMBOLUS (PE): ICD-10-CM

## 2023-01-27 LAB — INR BLD: 2 (ref 0.9–1.1)

## 2023-01-27 PROCEDURE — 36416 COLLJ CAPILLARY BLOOD SPEC: CPT

## 2023-01-27 PROCEDURE — 85610 PROTHROMBIN TIME: CPT

## 2023-01-27 NOTE — PROGRESS NOTES
ANTICOAGULATION MANAGEMENT     Ginna Quiroga 88 year old female is on warfarin with therapeutic INR result. (Goal INR 2.0-3.0)    Recent labs: (last 7 days)     01/27/23  1450   INR 2.0*       ASSESSMENT       Source(s): Chart Review    Previous INR was Supratherapeutic    Medication, diet, health changes since last INR chart reviewed; none identified           PLAN     Recommended plan for no diet, medication or health factor changes affecting INR     Dosing Instructions: Continue your current warfarin dose with next INR in 3 weeks       Summary  As of 1/27/2023    Full warfarin instructions:  2 mg every Sun, Thu; 1 mg all other days   Next INR check:  2/17/2023             Detailed voice message left for Ginna with dosing instructions and follow up date.     Contact [phone] to schedule and with any changes, questions or concerns.     Education provided:     Please call back if any changes to your diet, medications or how you've been taking warfarin    Plan made per [facility] anticoagulation protocol    Gretta Maxwell RN  Anticoagulation Clinic  1/27/2023    _______________________________________________________________________     Anticoagulation Episode Summary     Current INR goal:  2.0-3.0   TTR:  84.8 % (1 y)   Target end date:  Indefinite   Send INR reminders to:  PATRICIA BLACKBURN    Indications    Long term current use of anticoagulant therapy [Z79.01]  Paroxysmal atrial fibrillation (H) [I48.0]  History of pulmonary embolus (PE) [Z86.711]           Comments:           Anticoagulation Care Providers     Provider Role Specialty Phone number    Dawson Cloud MD Referring Internal Medicine [phone]

## 2023-01-30 ENCOUNTER — TELEPHONE (OUTPATIENT)
Dept: INTERNAL MEDICINE | Facility: CLINIC | Age: 88
End: 2023-01-30
Payer: COMMERCIAL

## 2023-01-30 NOTE — TELEPHONE ENCOUNTER
Reason for Call:  Request for results:    Name of test or procedure: INR last Friday 1-27-23    Date of test of procedure: 1-27-23    Location of the test or procedure: [place]    OK to leave the result message on voice mail or with a family member? NO    Phone number Patient can be reached at: [phone]    Additional comments: Patient states her home phone is not working and would like a call back regarding her results from last Friday to the cell number. Thank you    Call taken on 1/30/2023 at 10:53 AM by Vannesa Centeno

## 2023-01-30 NOTE — PROGRESS NOTES
ANTICOAGULATION MANAGEMENT       ASSESSMENT       Source(s): Chart Review and Patient/Caregiver Call       Warfarin doses taken: Warfarin taken differently, but did not change total weekly dose (took 2 mg on Sun/Tue instead. She will switch to Sun/Thur)    Diet: No new diet changes identified    New illness, injury, or hospitalization: No    Medication/supplement changes: None noted    Signs or symptoms of bleeding or clotting: No    Previous INR: Supratherapeutic    Additional findings: Home phone not working. Call cell.       PLAN     Recommended plan for no diet, medication or health factor changes affecting INR     Dosing Instructions: Continue your current warfarin dose with next INR in 3 weeks       Summary  As of 1/27/2023    Full warfarin instructions:  2 mg every Sun, Thu; 1 mg all other days   Next INR check:  2/17/2023             Telephone call with Ginna who verbalizes understanding and agrees to plan and who agrees to plan and repeated back plan correctly    She will call back to make INR appt when she know son's schedule for bringing her in.    Education provided:     Goal range and lab monitoring: goal range and significance of current result    Contact [phone] with any changes, questions or concerns.     Plan made per [facility] anticoagulation protocol    Teodora Hopper RN  Anticoagulation Clinic  1/30/2023    _______________________________________________________________________     Anticoagulation Episode Summary     Current INR goal:  2.0-3.0   TTR:  84.7 % (1 y)   Target end date:  Indefinite   Send INR reminders to:  PATRICIA BLACKBURN    Indications    Long term current use of anticoagulant therapy [Z79.01]  Paroxysmal atrial fibrillation (H) [I48.0]  History of pulmonary embolus (PE) [Z86.711]           Comments:           Anticoagulation Care Providers     Provider Role Specialty Phone number    Dawson Cloud MD Referring Internal Medicine [phone]

## 2023-02-17 ENCOUNTER — ANTICOAGULATION THERAPY VISIT (OUTPATIENT)
Dept: ANTICOAGULATION | Facility: CLINIC | Age: 88
End: 2023-02-17

## 2023-02-17 ENCOUNTER — LAB (OUTPATIENT)
Dept: LAB | Facility: CLINIC | Age: 88
End: 2023-02-17
Payer: COMMERCIAL

## 2023-02-17 DIAGNOSIS — Z86.711 HISTORY OF PULMONARY EMBOLUS (PE): ICD-10-CM

## 2023-02-17 DIAGNOSIS — I48.0 PAROXYSMAL ATRIAL FIBRILLATION (H): ICD-10-CM

## 2023-02-17 DIAGNOSIS — Z79.01 LONG TERM CURRENT USE OF ANTICOAGULANT THERAPY: Primary | ICD-10-CM

## 2023-02-17 LAB — INR BLD: 2.1 (ref 0.9–1.1)

## 2023-02-17 PROCEDURE — 36416 COLLJ CAPILLARY BLOOD SPEC: CPT

## 2023-02-17 PROCEDURE — 85610 PROTHROMBIN TIME: CPT

## 2023-02-17 NOTE — PROGRESS NOTES
ANTICOAGULATION MANAGEMENT     Ginna Quiroga 88 year old female is on warfarin with therapeutic INR result. (Goal INR 2.0-3.0)    Recent labs: (last 7 days)     02/17/23  1447   INR 2.1*       ASSESSMENT       Source(s): Chart Review and Patient/Caregiver Call       Warfarin doses taken: Warfarin taken as instructed    Diet: No new diet changes identified    New illness, injury, or hospitalization: No    Medication/supplement changes: None noted    Signs or symptoms of bleeding or clotting: No    Previous INR: Therapeutic last visit; previously outside of goal range    Additional findings: None       PLAN     Recommended plan for no diet, medication or health factor changes affecting INR     Dosing Instructions: Continue your current warfarin dose with next INR in 4 weeks       Summary  As of 2/17/2023    Full warfarin instructions:  2 mg every Sun, Thu; 1 mg all other days   Next INR check:  3/17/2023             Telephone call with Ginna who verbalizes understanding and agrees to plan and who agrees to plan and repeated back plan correctly    Patient offered & declined to schedule next visit    Education provided:     Please call back if any changes to your diet, medications or how you've been taking warfarin    Goal range and lab monitoring: goal range and significance of current result and Importance of therapeutic range    Plan made per [facility] anticoagulation protocol    Gretta Maxwell RN  Anticoagulation Clinic  2/17/2023    _______________________________________________________________________     Anticoagulation Episode Summary     Current INR goal:  2.0-3.0   TTR:  84.8 % (1 y)   Target end date:  Indefinite   Send INR reminders to:  PATRICIA BLACKBURN    Indications    Long term current use of anticoagulant therapy [Z79.01]  Paroxysmal atrial fibrillation (H) [I48.0]  History of pulmonary embolus (PE) [Z86.711]           Comments:           Anticoagulation Care Providers     Provider Role  Specialty Phone number    Dawson Cloud MD Referring Internal Medicine [phone]

## 2023-02-22 DIAGNOSIS — I10 ESSENTIAL HYPERTENSION: ICD-10-CM

## 2023-02-24 RX ORDER — FUROSEMIDE 40 MG
TABLET ORAL
Qty: 90 TABLET | Refills: 3 | Status: SHIPPED | OUTPATIENT
Start: 2023-02-24 | End: 2024-02-19

## 2023-02-24 NOTE — TELEPHONE ENCOUNTER
"Routing refill request to provider for review/approval because:  Labs out of range:  Cr   BPs out of range    Last Written Prescription Date:  11/23/22  Last Fill Quantity: 90,  # refills: 0   Last office visit provider:  12/23/22     Requested Prescriptions   Pending Prescriptions Disp Refills     furosemide (LASIX) 40 MG tablet 90 tablet 0     Sig: TAKE 1 TABLET(40MG) BY MOUTH DAILY. OVERDUE FOR FOLLOW UP APPOINTEMENT. PLEASE SCHEDULE YOUR ANNUAL WELLNESS VIST       Diuretics (Including Combos) Protocol Failed - 2/22/2023 10:58 AM        Failed - Blood pressure under 140/90 in past 12 months     BP Readings from Last 3 Encounters:   12/23/22 (!) 144/80   09/14/21 (!) 156/90   03/18/21 138/80                 Failed - Normal serum creatinine on file in past 12 months     Recent Labs   Lab Test 12/23/22  1528   CR 1.02*              Passed - Recent (12 mo) or future (30 days) visit within the authorizing provider's specialty     Patient has had an office visit with the authorizing provider or a provider within the authorizing providers department within the previous 12 mos or has a future within next 30 days. See \"Patient Info\" tab in inbasket, or \"Choose Columns\" in Meds & Orders section of the refill encounter.              Passed - Medication is active on med list        Passed - Patient is age 18 or older        Passed - No active pregancy on record        Passed - Normal serum potassium on file in past 12 months     Recent Labs   Lab Test 12/23/22  1528   POTASSIUM 3.4                    Passed - Normal serum sodium on file in past 12 months     Recent Labs   Lab Test 12/23/22  1528                 Passed - No positive pregnancy test in past 12 months             Leyda Justice RN 02/23/23 9:53 PM  "

## 2023-02-28 ENCOUNTER — TRANSFERRED RECORDS (OUTPATIENT)
Dept: HEALTH INFORMATION MANAGEMENT | Facility: CLINIC | Age: 88
End: 2023-02-28

## 2023-03-02 ENCOUNTER — TRANSFERRED RECORDS (OUTPATIENT)
Dept: HEALTH INFORMATION MANAGEMENT | Facility: CLINIC | Age: 88
End: 2023-03-02

## 2023-03-02 ENCOUNTER — OFFICE VISIT (OUTPATIENT)
Dept: INTERNAL MEDICINE | Facility: CLINIC | Age: 88
End: 2023-03-02
Payer: COMMERCIAL

## 2023-03-02 VITALS
SYSTOLIC BLOOD PRESSURE: 160 MMHG | RESPIRATION RATE: 16 BRPM | HEIGHT: 65 IN | HEART RATE: 84 BPM | BODY MASS INDEX: 27.24 KG/M2 | OXYGEN SATURATION: 99 % | DIASTOLIC BLOOD PRESSURE: 90 MMHG | TEMPERATURE: 98 F | WEIGHT: 163.5 LBS

## 2023-03-02 DIAGNOSIS — N18.31 STAGE 3A CHRONIC KIDNEY DISEASE (H): ICD-10-CM

## 2023-03-02 DIAGNOSIS — I48.0 PAROXYSMAL ATRIAL FIBRILLATION (H): ICD-10-CM

## 2023-03-02 DIAGNOSIS — R60.0 PERIPHERAL EDEMA: ICD-10-CM

## 2023-03-02 DIAGNOSIS — I10 ESSENTIAL HYPERTENSION: Primary | ICD-10-CM

## 2023-03-02 LAB
ANION GAP SERPL CALCULATED.3IONS-SCNC: 13 MMOL/L (ref 7–15)
BUN SERPL-MCNC: 25.8 MG/DL (ref 8–23)
CALCIUM SERPL-MCNC: 9.9 MG/DL (ref 8.8–10.2)
CHLORIDE SERPL-SCNC: 102 MMOL/L (ref 98–107)
CREAT SERPL-MCNC: 0.97 MG/DL (ref 0.51–0.95)
DEPRECATED HCO3 PLAS-SCNC: 30 MMOL/L (ref 22–29)
GFR SERPL CREATININE-BSD FRML MDRD: 56 ML/MIN/1.73M2
GLUCOSE SERPL-MCNC: 97 MG/DL (ref 70–99)
POTASSIUM SERPL-SCNC: 3.9 MMOL/L (ref 3.4–5.3)
SODIUM SERPL-SCNC: 145 MMOL/L (ref 136–145)

## 2023-03-02 PROCEDURE — 99214 OFFICE O/P EST MOD 30 MIN: CPT | Performed by: INTERNAL MEDICINE

## 2023-03-02 PROCEDURE — 36415 COLL VENOUS BLD VENIPUNCTURE: CPT | Performed by: INTERNAL MEDICINE

## 2023-03-02 PROCEDURE — 80048 BASIC METABOLIC PNL TOTAL CA: CPT | Performed by: INTERNAL MEDICINE

## 2023-03-02 RX ORDER — METOPROLOL SUCCINATE 100 MG/1
150 TABLET, EXTENDED RELEASE ORAL DAILY
Qty: 135 TABLET | Refills: 3 | Status: SHIPPED | OUTPATIENT
Start: 2023-03-02 | End: 2024-02-19

## 2023-03-02 NOTE — PROGRESS NOTES
"  Assessment & Plan     Essential hypertension  88-year-old woman here to follow-up hypertension which was not adequately controlled at her recent wellness visit.  She was switched from enalapril to valsartan 160 mg daily.  Also continues 100 mg of Toprol XL and furosemide.  Blood pressure is still elevated at 160/90.  She and her son are monitoring her blood pressure at home and it is running elevated there as well.  Usually around 160 systolic but will improve somewhat after rechecking but usually never under 140.  She is trying to restrict her sodium intake.  She rarely uses a saltshaker and is not using any canned items nor any microwave dinners.  Will increase Toprol  mg daily.  She has a permanent pacemaker and bradycardia would not be a concern.  We did discuss potential side effects of increasing fatigue.  They will continue to monitor her blood pressure at home and will update me in 4 weeks letting me know if systolic remains over 140 in which case her dose of valsartan can be increased to 320 mg daily.  - metoprolol succinate ER (TOPROL XL) 100 MG 24 hr tablet; Take 1.5 tablets (150 mg) by mouth daily  - Basic metabolic panel  (Ca, Cl, CO2, Creat, Gluc, K, Na, BUN); Future    Stage 3a chronic kidney disease (H)  Monitor renal function after switching from ACE inhibitor to ARB.  Borderline low potassium at her last visit which is being rechecked today.  - Basic metabolic panel  (Ca, Cl, CO2, Creat, Gluc, K, Na, BUN); Future    Peripheral edema  No change in chronic peripheral edema which improves with leg elevation.  Continue current dose of furosemide.    Paroxysmal atrial fibrillation (H)  Chronically anticoagulated with warfarin.  Tachybradycardia syndrome with permanent pacemaker placement               BMI:   Estimated body mass index is 27.21 kg/m  as calculated from the following:    Height as of this encounter: 1.651 m (5' 5\").    Weight as of this encounter: 74.2 kg (163 lb 8 oz). " "          Return in about 10 months (around 1/2/2024) for Annual wellness visit.    Dawson Cloud MD  [employer] [employer] Chacko is a 88 year old, presenting for the following health issues:  Follow Up      History of Present Illness       Hypertension: She presents for follow up of hypertension.  She does not check blood pressure  regularly outside of the clinic. Outside blood pressures have been over 140/90. She follows a low salt diet.     She eats 2-3 servings of fruits and vegetables daily.She consumes 0 sweetened beverage(s) daily.She exercises with enough effort to increase her heart rate 9 or less minutes per day.  She exercises with enough effort to increase her heart rate 3 or less days per week.   She is taking medications regularly.             Review of Systems   No change in peripheral edema.  No change in cognition.      Objective    BP (!) 160/90   Pulse 84   Temp 98  F (36.7  C) (Oral)   Resp 16   Ht 1.651 m (5' 5\")   Wt 74.2 kg (163 lb 8 oz)   SpO2 99%   BMI 27.21 kg/m    Body mass index is 27.21 kg/m .  Physical Exam   Well-appearing elderly woman  1+ bilateral ankle edema.        "

## 2023-03-03 ENCOUNTER — DOCUMENTATION ONLY (OUTPATIENT)
Dept: OTHER | Facility: CLINIC | Age: 88
End: 2023-03-03
Payer: COMMERCIAL

## 2023-03-17 ENCOUNTER — ANTICOAGULATION THERAPY VISIT (OUTPATIENT)
Dept: ANTICOAGULATION | Facility: CLINIC | Age: 88
End: 2023-03-17

## 2023-03-17 ENCOUNTER — TELEPHONE (OUTPATIENT)
Dept: INTERNAL MEDICINE | Facility: CLINIC | Age: 88
End: 2023-03-17

## 2023-03-17 ENCOUNTER — LAB (OUTPATIENT)
Dept: LAB | Facility: CLINIC | Age: 88
End: 2023-03-17
Payer: COMMERCIAL

## 2023-03-17 DIAGNOSIS — I48.0 PAROXYSMAL ATRIAL FIBRILLATION (H): ICD-10-CM

## 2023-03-17 DIAGNOSIS — Z86.711 HISTORY OF PULMONARY EMBOLUS (PE): ICD-10-CM

## 2023-03-17 DIAGNOSIS — Z79.01 LONG TERM CURRENT USE OF ANTICOAGULANT THERAPY: Primary | ICD-10-CM

## 2023-03-17 LAB — INR BLD: 1.8 (ref 0.9–1.1)

## 2023-03-17 PROCEDURE — 85610 PROTHROMBIN TIME: CPT

## 2023-03-17 PROCEDURE — 36416 COLLJ CAPILLARY BLOOD SPEC: CPT

## 2023-03-17 NOTE — PROGRESS NOTES
ANTICOAGULATION MANAGEMENT     Ginna SCHNEIDER Junaid 88 year old female is on warfarin with subtherapeutic INR result. (Goal INR 2.0-3.0)    Recent labs: (last 7 days)     03/17/23  1057   INR 1.8*       ASSESSMENT       Source(s): Chart Review, Patient/Caregiver Call and Template       Warfarin doses taken: Warfarin taken as instructed    Diet: No new diet changes identified    New illness, injury, or hospitalization: No    Medication/supplement changes: None noted    Signs or symptoms of bleeding or clotting: No    Previous INR: Therapeutic last 2(+) visits    Additional findings: She is going to [place] next week. She will recheck INR when back home.          PLAN     Recommended plan for no diet, medication or health factor changes affecting INR     Dosing Instructions: Increase your warfarin dose (11% change) with next INR in 2 weeks. She wanted to change her dose back to what she use to be for a long time.     Summary  As of 3/17/2023    Full warfarin instructions:  3/17: 2 mg; Otherwise 2 mg every Sun, Tue, Thu; 1 mg all other days   Next INR check:  3/31/2023             Telephone call with Ginna who verbalizes understanding and agrees to plan and who agrees to plan and repeated back plan correctly    Patient offered & declined to schedule next visit    Education provided:     Please call back if any changes to your diet, medications or how you've been taking warfarin    Goal range and lab monitoring: goal range and significance of current result and Importance of therapeutic range    Symptom monitoring: travel related clotting risk and prevention    Plan made per [facility] anticoagulation protocol    Gretta Maxwell, RN  Anticoagulation Clinic  3/17/2023    _______________________________________________________________________     Anticoagulation Episode Summary     Current INR goal:  2.0-3.0   TTR:  79.7 % (1 y)   Target end date:  Indefinite   Send INR reminders to:  PATRICIA BLACKBURN    Indications     Long term current use of anticoagulant therapy [Z79.01]  Paroxysmal atrial fibrillation (H) [I48.0]  History of pulmonary embolus (PE) [Z86.711]           Comments:           Anticoagulation Care Providers     Provider Role Specialty Phone number    Dawson Cloud MD Referring Internal Medicine [phone]

## 2023-04-07 ENCOUNTER — ANTICOAGULATION THERAPY VISIT (OUTPATIENT)
Dept: ANTICOAGULATION | Facility: CLINIC | Age: 88
End: 2023-04-07

## 2023-04-07 ENCOUNTER — LAB (OUTPATIENT)
Dept: LAB | Facility: CLINIC | Age: 88
End: 2023-04-07
Payer: COMMERCIAL

## 2023-04-07 DIAGNOSIS — Z79.01 LONG TERM CURRENT USE OF ANTICOAGULANT THERAPY: Primary | ICD-10-CM

## 2023-04-07 DIAGNOSIS — Z86.711 HISTORY OF PULMONARY EMBOLUS (PE): ICD-10-CM

## 2023-04-07 DIAGNOSIS — I48.0 PAROXYSMAL ATRIAL FIBRILLATION (H): ICD-10-CM

## 2023-04-07 LAB — INR BLD: 2.8 (ref 0.9–1.1)

## 2023-04-07 PROCEDURE — 85610 PROTHROMBIN TIME: CPT

## 2023-04-07 PROCEDURE — 36416 COLLJ CAPILLARY BLOOD SPEC: CPT

## 2023-04-07 NOTE — PROGRESS NOTES
ANTICOAGULATION MANAGEMENT     Ginna Quiroga 88 year old female is on warfarin with therapeutic INR result. (Goal INR 2.0-3.0)    Recent labs: (last 7 days)     04/07/23  1336   INR 2.8*       ASSESSMENT       Source(s): Chart Review and Patient/Caregiver Call       Warfarin doses taken: Warfarin taken as instructed    Diet: vacation may be affecting diet and INR    New illness, injury, or hospitalization: No    Medication/supplement changes: None noted    Signs or symptoms of bleeding or clotting: No    Previous INR: Subtherapeutic    Additional findings: she has been home for a little over one week from a trip         PLAN     Recommended plan for no diet, medication or health factor changes affecting INR     Dosing Instructions: Continue your current warfarin dose with next INR in 4 weeks       Summary  As of 4/7/2023    Full warfarin instructions:  2 mg every Sun, Tue, Thu; 1 mg all other days   Next INR check:  5/5/2023             Telephone call with Ginna who verbalizes understanding and agrees to plan and who agrees to plan and repeated back plan correctly    Lab visit scheduled    Education provided:     Please call back if any changes to your diet, medications or how you've been taking warfarin    Goal range and lab monitoring: goal range and significance of current result and Importance of therapeutic range    Plan made per [facility] anticoagulation protocol    Gretta Maxwell RN  Anticoagulation Clinic  4/7/2023    _______________________________________________________________________     Anticoagulation Episode Summary     Current INR goal:  2.0-3.0   TTR:  78.5 % (1 y)   Target end date:  Indefinite   Send INR reminders to:  PATRICIA BLACKBURN    Indications    Long term current use of anticoagulant therapy [Z79.01]  Paroxysmal atrial fibrillation (H) [I48.0]  History of pulmonary embolus (PE) [Z86.711]           Comments:           Anticoagulation Care Providers     Provider Role Specialty Phone  number    Dawson Cloud MD [facility] Internal Medicine [phone]

## 2023-04-20 ENCOUNTER — TRANSFERRED RECORDS (OUTPATIENT)
Dept: HEALTH INFORMATION MANAGEMENT | Facility: CLINIC | Age: 88
End: 2023-04-20

## 2023-05-05 ENCOUNTER — LAB (OUTPATIENT)
Dept: LAB | Facility: CLINIC | Age: 88
End: 2023-05-05
Payer: COMMERCIAL

## 2023-05-05 ENCOUNTER — ANTICOAGULATION THERAPY VISIT (OUTPATIENT)
Dept: ANTICOAGULATION | Facility: CLINIC | Age: 88
End: 2023-05-05

## 2023-05-05 DIAGNOSIS — Z79.01 LONG TERM CURRENT USE OF ANTICOAGULANT THERAPY: Primary | ICD-10-CM

## 2023-05-05 DIAGNOSIS — I48.0 PAROXYSMAL ATRIAL FIBRILLATION (H): ICD-10-CM

## 2023-05-05 DIAGNOSIS — Z86.711 HISTORY OF PULMONARY EMBOLUS (PE): ICD-10-CM

## 2023-05-05 LAB — INR BLD: 2.1 (ref 0.9–1.1)

## 2023-05-05 PROCEDURE — 36416 COLLJ CAPILLARY BLOOD SPEC: CPT

## 2023-05-05 PROCEDURE — 85610 PROTHROMBIN TIME: CPT

## 2023-05-05 NOTE — PROGRESS NOTES
ANTICOAGULATION MANAGEMENT     Ginna Quiroga 88 year old female is on warfarin with therapeutic INR result. (Goal INR 2.0-3.0)    Recent labs: (last 7 days)     05/05/23  1402   INR 2.1*       ASSESSMENT       Source(s): Chart Review    Previous INR was Therapeutic last visit; previously outside of goal range    Medication, diet, health changes since last INR chart reviewed; none identified             PLAN     Recommended plan for no diet, medication or health factor changes affecting INR     Dosing Instructions: Continue your current warfarin dose with next INR in 5 weeks       Summary  As of 5/5/2023    Full warfarin instructions:  2 mg every Sun, Tue, Thu; 1 mg all other days   Next INR check:  6/9/2023             Detailed voice message left for Ginna with dosing instructions and follow up date.     Contact [phone] to schedule and with any changes, questions or concerns.     Education provided:     Please call back if any changes to your diet, medications or how you've been taking warfarin    Plan made per [facility] anticoagulation protocol    Gretta Maxwell RN  Anticoagulation Clinic  5/5/2023    _______________________________________________________________________     Anticoagulation Episode Summary     Current INR goal:  2.0-3.0   TTR:  78.5 % (1 y)   Target end date:  Indefinite   Send INR reminders to:  PATRICIA BLACKBURN    Indications    Long term current use of anticoagulant therapy [Z79.01]  Paroxysmal atrial fibrillation (H) [I48.0]  History of pulmonary embolus (PE) [Z86.711]           Comments:           Anticoagulation Care Providers     Provider Role Specialty Phone number    Dawson Cloud MD Referring Internal Medicine [phone]

## 2023-06-05 ENCOUNTER — LAB (OUTPATIENT)
Dept: LAB | Facility: CLINIC | Age: 88
End: 2023-06-05
Payer: COMMERCIAL

## 2023-06-05 ENCOUNTER — ANTICOAGULATION THERAPY VISIT (OUTPATIENT)
Dept: ANTICOAGULATION | Facility: CLINIC | Age: 88
End: 2023-06-05

## 2023-06-05 DIAGNOSIS — I48.0 PAROXYSMAL ATRIAL FIBRILLATION (H): ICD-10-CM

## 2023-06-05 DIAGNOSIS — Z86.711 HISTORY OF PULMONARY EMBOLUS (PE): ICD-10-CM

## 2023-06-05 DIAGNOSIS — Z79.01 LONG TERM CURRENT USE OF ANTICOAGULANT THERAPY: Primary | ICD-10-CM

## 2023-06-05 LAB — INR BLD: 2.2 (ref 0.9–1.1)

## 2023-06-05 PROCEDURE — 36416 COLLJ CAPILLARY BLOOD SPEC: CPT

## 2023-06-05 PROCEDURE — 85610 PROTHROMBIN TIME: CPT

## 2023-06-05 NOTE — PROGRESS NOTES
ANTICOAGULATION MANAGEMENT     Ginna E Junaid 88 year old female is on warfarin with therapeutic INR result. (Goal INR 2.0-3.0)    Recent labs: (last 7 days)     06/05/23  1459   INR 2.2*       ASSESSMENT       Source(s): Chart Review and Patient/Caregiver Call       Warfarin doses taken: Warfarin taken as instructed    Diet: No new diet changes identified    Medication/supplement changes: None noted    New illness, injury, or hospitalization: No    Signs or symptoms of bleeding or clotting: No    Previous result: Therapeutic last 2(+) visits    Additional findings: None         PLAN     Recommended plan for no diet, medication or health factor changes affecting INR     Dosing Instructions: Continue your current warfarin dose with next INR in 6 weeks       Summary  As of 6/5/2023    Full warfarin instructions:  2 mg every Sun, Tue, Thu; 1 mg all other days   Next INR check:  7/17/2023             Telephone call with Ginna who verbalizes understanding and agrees to plan and who agrees to plan and repeated back plan correctly    Patient offered & declined to schedule next visit    Education provided:     Please call back if any changes to your diet, medications or how you've been taking warfarin    Goal range and lab monitoring: goal range and significance of current result and Importance of therapeutic range    Plan made per [facility] anticoagulation protocol    Gretta Maxwell RN  Anticoagulation Clinic  6/5/2023    _______________________________________________________________________     Anticoagulation Episode Summary     Current INR goal:  2.0-3.0   TTR:  78.5 % (1 y)   Target end date:  Indefinite   Send INR reminders to:  PATRICIA BLACKBURN    Indications    Long term current use of anticoagulant therapy [Z79.01]  Paroxysmal atrial fibrillation (H) [I48.0]  History of pulmonary embolus (PE) [Z86.711]           Comments:           Anticoagulation Care Providers     Provider Role Specialty Phone number     Dawson Cloud MD [facility] Internal Medicine [phone]

## 2023-06-27 DIAGNOSIS — Z86.711 HISTORY OF PULMONARY EMBOLUS (PE): ICD-10-CM

## 2023-06-27 DIAGNOSIS — Z79.01 LONG TERM CURRENT USE OF ANTICOAGULANT THERAPY: ICD-10-CM

## 2023-06-27 RX ORDER — WARFARIN SODIUM 2 MG/1
1-2 TABLET ORAL DAILY
Qty: 90 TABLET | Refills: 1 | Status: SHIPPED | OUTPATIENT
Start: 2023-06-27 | End: 2023-10-25

## 2023-06-27 NOTE — TELEPHONE ENCOUNTER
ANTICOAGULATION MANAGEMENT:  Medication Refill    Anticoagulation Summary  As of 6/5/2023    Warfarin maintenance plan:  2 mg (2 mg x 1) every Sun, Tue, Thu; 1 mg (2 mg x 0.5) all other days   Next INR check:  7/17/2023   Target end date:  Indefinite    Indications    Long term current use of anticoagulant therapy [Z79.01]  Paroxysmal atrial fibrillation (H) [I48.0]  History of pulmonary embolus (PE) [Z86.711]             Anticoagulation Care Providers     Provider Role Specialty Phone number    Dawson Cloud MD Referring Internal Medicine [phone]          Visit with referring provider/group within last year: Yes    [facility] referral signed last signed: 10/11/2022; within last year: Yes    Ginna meets all criteria for refill (current [facility] referral, office visit with referring provider/group in last year, lab monitoring up to date or not exceeding 2 weeks overdue). Rx instructions and quantity supplied updated to match patient's current dosing plan. Warfarin 90 day supply with 1 refill granted per [facility] protocol     Gretta Maxwell RN  Anticoagulation Clinic

## 2023-06-27 NOTE — TELEPHONE ENCOUNTER
"Routing refill request to provider for review/approval because:  Labs out of range:  INR    Last Written Prescription Date:  02/22/2023  Last Fill Quantity: 90,  # refills: 1  Last office visit provider:   3/02/2023    Requested Prescriptions   Pending Prescriptions Disp Refills     warfarin ANTICOAGULANT (COUMADIN) 2 MG tablet [Pharmacy Med Name: WARFARIN SODIUM 2MG TABLET] 90 tablet 1     Sig: TAKE 0.5-1 TABLETS (1-2 MG) BY MOUTH DAILY ADJUST DOSE PER INR RESULTS AS DIRECTED.       Vitamin K Antagonists Failed - 6/27/2023 12:24 PM        Failed - INR is within goal in the past 6 weeks     Confirm INR is within goal in the past 6 weeks.     Recent Labs   Lab Test 06/05/23  1459   INR 2.2*                       Passed - Recent (12 mo) or future (30 days) visit within the authorizing provider's specialty     Patient has had an office visit with the authorizing provider or a provider within the authorizing providers department within the previous 12 mos or has a future within next 30 days. See \"Patient Info\" tab in inbasket, or \"Choose Columns\" in Meds & Orders section of the refill encounter.              Passed - Medication is active on med list        Passed - Patient is 18 years of age or older        Passed - Patient is not pregnant        Passed - No positive pregnancy on file in past 12 months             Suresh Abreu RN 06/27/23 12:25 PM  "

## 2023-07-17 ENCOUNTER — LAB (OUTPATIENT)
Dept: LAB | Facility: CLINIC | Age: 88
End: 2023-07-17
Payer: COMMERCIAL

## 2023-07-17 ENCOUNTER — ANTICOAGULATION THERAPY VISIT (OUTPATIENT)
Dept: ANTICOAGULATION | Facility: CLINIC | Age: 88
End: 2023-07-17

## 2023-07-17 DIAGNOSIS — Z86.711 HISTORY OF PULMONARY EMBOLUS (PE): ICD-10-CM

## 2023-07-17 DIAGNOSIS — I48.0 PAROXYSMAL ATRIAL FIBRILLATION (H): ICD-10-CM

## 2023-07-17 DIAGNOSIS — Z79.01 LONG TERM CURRENT USE OF ANTICOAGULANT THERAPY: Primary | ICD-10-CM

## 2023-07-17 LAB — INR BLD: 2.4 (ref 0.9–1.1)

## 2023-07-17 PROCEDURE — 85610 PROTHROMBIN TIME: CPT

## 2023-07-17 PROCEDURE — 36416 COLLJ CAPILLARY BLOOD SPEC: CPT

## 2023-07-17 NOTE — PROGRESS NOTES
ANTICOAGULATION MANAGEMENT     Ginna WYATT Junaid 88 year old female is on warfarin with therapeutic INR result. (Goal INR 2.0-3.0)    Recent labs: (last 7 days)     07/17/23  1459   INR 2.4*       ASSESSMENT       Source(s): Chart Review and Patient/Caregiver Call       Warfarin doses taken: Warfarin taken as instructed    Diet: No new diet changes identified    Medication/supplement changes: None noted    New illness, injury, or hospitalization: No    Signs or symptoms of bleeding or clotting: No    Previous result: Therapeutic last 2(+) visits    Additional findings: None         PLAN     Recommended plan for no diet, medication or health factor changes affecting INR     Dosing Instructions: Continue your current warfarin dose with next INR in 6 weeks       Summary  As of 7/17/2023    Full warfarin instructions:  2 mg every Sun, Tue, Thu; 1 mg all other days   Next INR check:  8/28/2023             Telephone call with Ginna who verbalizes understanding and agrees to plan and who agrees to plan and repeated back plan correctly    Patient offered & declined to schedule next visit    Education provided:     Please call back if any changes to your diet, medications or how you've been taking warfarin    Goal range and lab monitoring: goal range and significance of current result and Importance of therapeutic range    Plan made per [facility] anticoagulation protocol    Gretta Maxwell RN  Anticoagulation Clinic  7/17/2023    _______________________________________________________________________     Anticoagulation Episode Summary     Current INR goal:  2.0-3.0   TTR:  83.0 % (1 y)   Target end date:  Indefinite   Send INR reminders to:  PATRICIA BLACKBURN    Indications    Long term current use of anticoagulant therapy [Z79.01]  Paroxysmal atrial fibrillation (H) [I48.0]  History of pulmonary embolus (PE) [Z86.711]           Comments:           Anticoagulation Care Providers     Provider Role Specialty Phone number     Dawson Cloud MD [facility] Internal Medicine [phone]

## 2023-08-10 ENCOUNTER — TRANSFERRED RECORDS (OUTPATIENT)
Dept: HEALTH INFORMATION MANAGEMENT | Facility: CLINIC | Age: 88
End: 2023-08-10
Payer: COMMERCIAL

## 2023-08-30 ENCOUNTER — ANTICOAGULATION THERAPY VISIT (OUTPATIENT)
Dept: ANTICOAGULATION | Facility: CLINIC | Age: 88
End: 2023-08-30

## 2023-08-30 ENCOUNTER — LAB (OUTPATIENT)
Dept: LAB | Facility: CLINIC | Age: 88
End: 2023-08-30
Payer: COMMERCIAL

## 2023-08-30 ENCOUNTER — DOCUMENTATION ONLY (OUTPATIENT)
Dept: ANTICOAGULATION | Facility: CLINIC | Age: 88
End: 2023-08-30

## 2023-08-30 DIAGNOSIS — Z79.01 LONG TERM CURRENT USE OF ANTICOAGULANT THERAPY: Primary | ICD-10-CM

## 2023-08-30 DIAGNOSIS — Z86.711 HISTORY OF PULMONARY EMBOLUS (PE): ICD-10-CM

## 2023-08-30 DIAGNOSIS — I48.0 PAROXYSMAL ATRIAL FIBRILLATION (H): ICD-10-CM

## 2023-08-30 LAB — INR BLD: 2 (ref 0.9–1.1)

## 2023-08-30 PROCEDURE — 85610 PROTHROMBIN TIME: CPT

## 2023-08-30 PROCEDURE — 36416 COLLJ CAPILLARY BLOOD SPEC: CPT

## 2023-08-30 NOTE — PROGRESS NOTES
ANTICOAGULATION CLINIC REFERRAL RENEWAL REQUEST       An annual renewal order is required for all patients referred to [facility]ticoagulation [facility].?  Please review and sign the pended referral order for Ginna Quiroga.       ANTICOAGULATION SUMMARY      Warfarin indication(s)   Atrial Fibrillation and PE    Mechanical heart valve present?  NO       Current goal range   INR: 2.0-3.0     Goal appropriate for indication? Goal INR 2-3, standard for indication(s) above     Time in Therapeutic Range (TTR)  (Goal > 60%) 85.3%       Office visit with referring provider's group within last year yes on [id number]       Gretta Maxwell RN  [facility]tico[facility]

## 2023-08-30 NOTE — PROGRESS NOTES
ANTICOAGULATION MANAGEMENT     Ginna Quiroga 88 year old female is on warfarin with therapeutic INR result. (Goal INR 2.0-3.0)    Recent labs: (last 7 days)     08/30/23  1312   INR 2.0*       ASSESSMENT     Source(s): Chart Review and Patient/Caregiver Call     Warfarin doses taken: Missed dose(s) may be affecting INR last nights dose.   Diet: No new diet changes identified  Medication/supplement changes: None noted  New illness, injury, or hospitalization: No  Signs or symptoms of bleeding or clotting: No  Previous result: Therapeutic last 2(+) visits  Additional findings: None       PLAN     Recommended plan for temporary change(s) affecting INR     Dosing Instructions: booster dose then continue your current warfarin dose with next INR in 6 weeks       Summary  As of 8/30/2023      Full warfarin instructions:  8/30: 2 mg; Otherwise 2 mg every Sun, Tue, Thu; 1 mg all other days   Next INR check:  10/11/2023               Telephone call with Ginna who verbalizes understanding and agrees to plan and who agrees to plan and repeated back plan correctly    Patient offered & declined to schedule next visit    Education provided:   Please call back if any changes to your diet, medications or how you've been taking warfarin  Goal range and lab monitoring: goal range and significance of current result and Importance of therapeutic range    Plan made per [facility] anticoagulation protocol    Gretta Maxwell RN  Anticoagulation Clinic  8/30/2023    _______________________________________________________________________     Anticoagulation Episode Summary       Current INR goal:  2.0-3.0   TTR:  85.3 % (1 y)   Target end date:  Indefinite   Send INR reminders to:  PATRICIA BLACKBURN    Indications    Long term current use of anticoagulant therapy [Z79.01]  Paroxysmal atrial fibrillation (H) [I48.0]  History of pulmonary embolus (PE) [Z86.711]             Comments:               Anticoagulation Care Providers        Provider Role Specialty Phone number    Dawson Cloud MD Referring Internal Medicine [phone]

## 2023-10-11 ENCOUNTER — LAB (OUTPATIENT)
Dept: LAB | Facility: CLINIC | Age: 88
End: 2023-10-11
Payer: COMMERCIAL

## 2023-10-11 ENCOUNTER — ANTICOAGULATION THERAPY VISIT (OUTPATIENT)
Dept: ANTICOAGULATION | Facility: CLINIC | Age: 88
End: 2023-10-11

## 2023-10-11 DIAGNOSIS — N18.30 CKD (CHRONIC KIDNEY DISEASE) STAGE 3, GFR 30-59 ML/MIN (H): Primary | ICD-10-CM

## 2023-10-11 DIAGNOSIS — I48.0 PAROXYSMAL ATRIAL FIBRILLATION (H): ICD-10-CM

## 2023-10-11 DIAGNOSIS — Z86.711 HISTORY OF PULMONARY EMBOLUS (PE): ICD-10-CM

## 2023-10-11 DIAGNOSIS — Z79.01 LONG TERM CURRENT USE OF ANTICOAGULANT THERAPY: Primary | ICD-10-CM

## 2023-10-11 DIAGNOSIS — Z79.01 LONG TERM CURRENT USE OF ANTICOAGULANT THERAPY: ICD-10-CM

## 2023-10-11 LAB — INR BLD: 2.1 (ref 0.9–1.1)

## 2023-10-11 PROCEDURE — 36416 COLLJ CAPILLARY BLOOD SPEC: CPT

## 2023-10-11 PROCEDURE — 85610 PROTHROMBIN TIME: CPT

## 2023-10-11 NOTE — PROGRESS NOTES
ANTICOAGULATION MANAGEMENT     Ginna Quiroga 89 year old female is on warfarin with therapeutic INR result. (Goal INR 2.0-3.0)    Recent labs: (last 7 days)     10/11/23  1516   INR 2.1*       ASSESSMENT     Source(s): Chart Review  Previous INR was Therapeutic last 2(+) visits  Medication, diet, health changes since last INR chart reviewed; none identified         PLAN     Recommended plan for no diet, medication or health factor changes affecting INR     Dosing Instructions: Continue your current warfarin dose with next INR in 6 weeks       Summary  As of 10/11/2023      Full warfarin instructions:  2 mg every Sun, Tue, Thu; 1 mg all other days   Next INR check:  11/22/2023               Detailed voice message left for Ginna with dosing instructions and follow up date.     Contact [phone] to schedule and with any changes, questions or concerns.     Education provided:   Please call back if any changes to your diet, medications or how you've been taking warfarin    Plan made per [facility] anticoagulation protocol    Gretta Maxwell RN  Anticoagulation Clinic  10/11/2023    _______________________________________________________________________     Anticoagulation Episode Summary       Current INR goal:  2.0-3.0   TTR:  85.3% (1 y)   Target end date:  Indefinite   Send INR reminders to:  PATRICIA BLACKBURN    Indications    Long term current use of anticoagulant therapy [Z79.01]  Paroxysmal atrial fibrillation (H) [I48.0]  History of pulmonary embolus (PE) [Z86.711]             Comments:               Anticoagulation Care Providers       Provider Role Specialty Phone number    Dawson Cloud MD Referring Internal Medicine [phone]

## 2023-10-25 DIAGNOSIS — Z79.01 LONG TERM CURRENT USE OF ANTICOAGULANT THERAPY: ICD-10-CM

## 2023-10-25 DIAGNOSIS — Z86.711 HISTORY OF PULMONARY EMBOLUS (PE): ICD-10-CM

## 2023-10-25 RX ORDER — WARFARIN SODIUM 2 MG/1
1-2 TABLET ORAL DAILY
Qty: 90 TABLET | Refills: 1 | Status: SHIPPED | OUTPATIENT
Start: 2023-10-25 | End: 2024-02-20

## 2023-10-25 NOTE — TELEPHONE ENCOUNTER
ANTICOAGULATION MANAGEMENT:  Medication Refill    Anticoagulation Summary  As of 10/11/2023      Warfarin maintenance plan:  2 mg (2 mg x 1) every Sun, Tue, Thu; 1 mg (2 mg x 0.5) all other days   Next INR check:  11/22/2023   Target end date:  Indefinite    Indications    Long term current use of anticoagulant therapy [Z79.01]  Paroxysmal atrial fibrillation (H) [I48.0]  History of pulmonary embolus (PE) [Z86.711]                 Anticoagulation Care Providers       Provider Role Specialty Phone number    Dawson Cloud MD Referring Internal Medicine [phone]            Refill Criteria    Visit with referring provider/group: Meets criteria: office visit within referring provider group in the last 1 year on 3/2/23    [facility] referral signed last signed: 08/31/2023; within last year: Yes    Lab monitoring not exceeding 2 weeks overdue: Yes    Ginna meets all criteria for refill. Rx instructions and quantity supplied updated to match patient's current dosing plan. Warfarin 90 day supply with 1 refill granted per [facility] protocol     Gretta Maxwell RN  Anticoagulation Clinic

## 2023-11-09 ENCOUNTER — TRANSFERRED RECORDS (OUTPATIENT)
Dept: HEALTH INFORMATION MANAGEMENT | Facility: CLINIC | Age: 88
End: 2023-11-09
Payer: COMMERCIAL

## 2023-11-15 ENCOUNTER — LAB (OUTPATIENT)
Dept: LAB | Facility: CLINIC | Age: 88
End: 2023-11-15
Payer: COMMERCIAL

## 2023-11-15 ENCOUNTER — ANTICOAGULATION THERAPY VISIT (OUTPATIENT)
Dept: ANTICOAGULATION | Facility: CLINIC | Age: 88
End: 2023-11-15

## 2023-11-15 DIAGNOSIS — Z79.01 LONG TERM CURRENT USE OF ANTICOAGULANT THERAPY: Primary | ICD-10-CM

## 2023-11-15 DIAGNOSIS — I48.0 PAROXYSMAL ATRIAL FIBRILLATION (H): ICD-10-CM

## 2023-11-15 DIAGNOSIS — Z86.711 HISTORY OF PULMONARY EMBOLUS (PE): ICD-10-CM

## 2023-11-15 DIAGNOSIS — Z79.01 LONG TERM CURRENT USE OF ANTICOAGULANT THERAPY: ICD-10-CM

## 2023-11-15 LAB — INR BLD: 2.8 (ref 0.9–1.1)

## 2023-11-15 PROCEDURE — 85610 PROTHROMBIN TIME: CPT

## 2023-11-15 PROCEDURE — 36416 COLLJ CAPILLARY BLOOD SPEC: CPT

## 2023-11-15 NOTE — PROGRESS NOTES
ANTICOAGULATION MANAGEMENT     Ginna Quiroga 89 year old female is on warfarin with therapeutic INR result. (Goal INR 2.0-3.0)    Recent labs: (last 7 days)     11/15/23  1319   INR 2.8*       ASSESSMENT     Source(s): Chart Review and Patient/Caregiver Call     Warfarin doses taken: Warfarin taken as instructed  Diet: No new diet changes identified  Medication/supplement changes: None noted  New illness, injury, or hospitalization: No  Signs or symptoms of bleeding or clotting: No  Previous result: Therapeutic last 2(+) visits  Additional findings:  Patient's sister passed away last week       PLAN     Recommended plan for no diet, medication or health factor changes affecting INR     Dosing Instructions: Continue your current warfarin dose with next INR in 6 weeks       Summary  As of 11/15/2023      Full warfarin instructions:  2 mg every Sun, Tue, Thu; 1 mg all other days   Next INR check:  12/27/2023               Telephone call with Ginna who verbalizes understanding and agrees to plan    Patient offered & declined to schedule next visit    Education provided:   Goal range and lab monitoring: goal range and significance of current result    Plan made per [facility] anticoagulation protocol    Dixie Cordoba RN  Anticoagulation Clinic  11/15/2023    _______________________________________________________________________     Anticoagulation Episode Summary       Current INR goal:  2.0-3.0   TTR:  85.3% (1 y)   Target end date:  Indefinite   Send INR reminders to:  PATRCIIA BLACKBURN    Indications    Long term current use of anticoagulant therapy [Z79.01]  Paroxysmal atrial fibrillation (H) [I48.0]  History of pulmonary embolus (PE) [Z86.711]             Comments:               Anticoagulation Care Providers       Provider Role Specialty Phone number    Dawson Cloud MD Referring Internal Medicine [phone]

## 2023-11-17 DIAGNOSIS — M80.00XD AGE-RELATED OSTEOPOROSIS WITH CURRENT PATHOLOGICAL FRACTURE WITH ROUTINE HEALING: ICD-10-CM

## 2023-11-17 RX ORDER — ALENDRONATE SODIUM 70 MG/1
TABLET ORAL
Qty: 12 TABLET | Refills: 3 | OUTPATIENT
Start: 2023-11-17

## 2023-12-18 DIAGNOSIS — I10 ESSENTIAL HYPERTENSION: ICD-10-CM

## 2023-12-18 RX ORDER — VALSARTAN 160 MG/1
160 TABLET ORAL DAILY
Qty: 90 TABLET | Refills: 0 | Status: SHIPPED | OUTPATIENT
Start: 2023-12-18 | End: 2024-03-21

## 2023-12-28 ENCOUNTER — LAB (OUTPATIENT)
Dept: LAB | Facility: CLINIC | Age: 88
End: 2023-12-28
Payer: COMMERCIAL

## 2023-12-28 ENCOUNTER — ANTICOAGULATION THERAPY VISIT (OUTPATIENT)
Dept: ANTICOAGULATION | Facility: CLINIC | Age: 88
End: 2023-12-28

## 2023-12-28 DIAGNOSIS — Z86.711 HISTORY OF PULMONARY EMBOLUS (PE): ICD-10-CM

## 2023-12-28 DIAGNOSIS — I48.0 PAROXYSMAL ATRIAL FIBRILLATION (H): ICD-10-CM

## 2023-12-28 DIAGNOSIS — Z79.01 LONG TERM CURRENT USE OF ANTICOAGULANT THERAPY: ICD-10-CM

## 2023-12-28 DIAGNOSIS — Z79.01 LONG TERM CURRENT USE OF ANTICOAGULANT THERAPY: Primary | ICD-10-CM

## 2023-12-28 LAB — INR BLD: 3.6 (ref 0.9–1.1)

## 2023-12-28 PROCEDURE — 36415 COLL VENOUS BLD VENIPUNCTURE: CPT

## 2023-12-28 PROCEDURE — 85610 PROTHROMBIN TIME: CPT

## 2023-12-28 NOTE — PROGRESS NOTES
ANTICOAGULATION MANAGEMENT     Ginna E Junaid 89 year old female is on warfarin with supratherapeutic INR result. (Goal INR 2.0-3.0)    Recent labs: (last 7 days)     12/28/23  1330   INR 3.6*       ASSESSMENT     Source(s): Chart Review and Patient/Caregiver Call     Warfarin doses taken: Warfarin taken as instructed  Diet: Decreased greens/vitamin K in diet; plans to resume previous intake  Medication/supplement changes: None noted  New illness, injury, or hospitalization: No  Signs or symptoms of bleeding or clotting: No  Previous result: Therapeutic last 2(+) visits  Additional findings: None       PLAN     Recommended plan for temporary change(s) affecting INR     Dosing Instructions: hold dose then continue your current warfarin dose with next INR in 1-2 weeks       Summary  As of 12/28/2023      Full warfarin instructions:  12/28: Hold; Otherwise 2 mg every Sun, Tue, Thu; 1 mg all other days   Next INR check:  1/11/2024               Telephone call with Ginna who verbalizes understanding and agrees to plan and who agrees to plan and repeated back plan correctly    Patient offered & declined to schedule next visit    Education provided:   Please call back if any changes to your diet, medications or how you've been taking warfarin  Goal range and lab monitoring: goal range and significance of current result and Importance of therapeutic range  Dietary considerations: importance of consistent vitamin K intake    Plan made per [facility] anticoagulation protocol    Gretta Maxwell RN  Anticoagulation Clinic  12/28/2023    _______________________________________________________________________     Anticoagulation Episode Summary       Current INR goal:  2.0-3.0   TTR:  80.4% (1 y)   Target end date:  Indefinite   Send INR reminders to:  PATRICIA BLACKBURN    Indications    Long term current use of anticoagulant therapy [Z79.01]  Paroxysmal atrial fibrillation (H) [I48.0]  History of pulmonary embolus (PE)  [Z60.701]             Comments:               Anticoagulation Care Providers       Provider Role Specialty Phone number    Dawson Cloud MD Referring Internal Medicine [phone]

## 2024-01-04 ENCOUNTER — TRANSFERRED RECORDS (OUTPATIENT)
Dept: HEALTH INFORMATION MANAGEMENT | Facility: CLINIC | Age: 89
End: 2024-01-04
Payer: COMMERCIAL

## 2024-01-12 ENCOUNTER — LAB (OUTPATIENT)
Dept: LAB | Facility: CLINIC | Age: 89
End: 2024-01-12
Payer: COMMERCIAL

## 2024-01-12 ENCOUNTER — ANTICOAGULATION THERAPY VISIT (OUTPATIENT)
Dept: ANTICOAGULATION | Facility: CLINIC | Age: 89
End: 2024-01-12

## 2024-01-12 DIAGNOSIS — Z79.01 LONG TERM CURRENT USE OF ANTICOAGULANT THERAPY: Primary | ICD-10-CM

## 2024-01-12 DIAGNOSIS — Z86.711 HISTORY OF PULMONARY EMBOLUS (PE): ICD-10-CM

## 2024-01-12 DIAGNOSIS — I48.0 PAROXYSMAL ATRIAL FIBRILLATION (H): ICD-10-CM

## 2024-01-12 DIAGNOSIS — Z79.01 LONG TERM CURRENT USE OF ANTICOAGULANT THERAPY: ICD-10-CM

## 2024-01-12 LAB — INR BLD: 2.1 (ref 0.9–1.1)

## 2024-01-12 PROCEDURE — 85610 PROTHROMBIN TIME: CPT

## 2024-01-12 PROCEDURE — 36416 COLLJ CAPILLARY BLOOD SPEC: CPT

## 2024-01-12 NOTE — PROGRESS NOTES
ANTICOAGULATION MANAGEMENT     Ginna Quiroga 89 year old female is on warfarin with therapeutic INR result. (Goal INR 2.0-3.0)    Recent labs: (last 7 days)     01/12/24  1419   INR 2.1*       ASSESSMENT     Source(s): Chart Review and Patient/Caregiver Call     Warfarin doses taken: Warfarin taken as instructed  Diet: No new diet changes identified-back to eating her greens  Medication/supplement changes: None noted  New illness, injury, or hospitalization: No  Signs or symptoms of bleeding or clotting: No  Previous result: Supratherapeutic  Additional findings: None       PLAN     Recommended plan for ongoing change(s) affecting INR     Dosing Instructions: Continue your current warfarin dose with next INR in 3 weeks       Summary  As of 1/12/2024      Full warfarin instructions:  2 mg every Sun, Tue, Thu; 1 mg all other days   Next INR check:  2/2/2024               Telephone call with Ginna who verbalizes understanding and agrees to plan and who agrees to plan and repeated back plan correctly    Patient offered & declined to schedule next visit    Education provided:   Please call back if any changes to your diet, medications or how you've been taking warfarin  Goal range and lab monitoring: goal range and significance of current result and Importance of therapeutic range    Plan made per [facility] anticoagulation protocol    Gretta Maxwell, RN  Anticoagulation Clinic  1/12/2024    _______________________________________________________________________     Anticoagulation Episode Summary       Current INR goal:  2.0-3.0   TTR:  82.9% (1 y)   Target end date:  Indefinite   Send INR reminders to:  PATRICIA BLACKBURN    Indications    Long term current use of anticoagulant therapy [Z79.01]  Paroxysmal atrial fibrillation (H) [I48.0]  History of pulmonary embolus (PE) [Z86.711]             Comments:               Anticoagulation Care Providers       Provider Role Specialty Phone number    Dawson Cloud MD  [facility] Internal Medicine [phone]

## 2024-02-01 ENCOUNTER — LAB (OUTPATIENT)
Dept: LAB | Facility: CLINIC | Age: 89
End: 2024-02-01
Payer: COMMERCIAL

## 2024-02-01 ENCOUNTER — ANTICOAGULATION THERAPY VISIT (OUTPATIENT)
Dept: ANTICOAGULATION | Facility: CLINIC | Age: 89
End: 2024-02-01

## 2024-02-01 DIAGNOSIS — Z79.01 LONG TERM CURRENT USE OF ANTICOAGULANT THERAPY: ICD-10-CM

## 2024-02-01 DIAGNOSIS — Z86.711 HISTORY OF PULMONARY EMBOLUS (PE): ICD-10-CM

## 2024-02-01 DIAGNOSIS — I48.0 PAROXYSMAL ATRIAL FIBRILLATION (H): ICD-10-CM

## 2024-02-01 DIAGNOSIS — Z79.01 LONG TERM CURRENT USE OF ANTICOAGULANT THERAPY: Primary | ICD-10-CM

## 2024-02-01 LAB — INR BLD: 2.3 (ref 0.9–1.1)

## 2024-02-01 PROCEDURE — 36415 COLL VENOUS BLD VENIPUNCTURE: CPT

## 2024-02-01 PROCEDURE — 85610 PROTHROMBIN TIME: CPT

## 2024-02-01 NOTE — PROGRESS NOTES
ANTICOAGULATION MANAGEMENT     Ginna Quiroga 89 year old female is on warfarin with therapeutic INR result. (Goal INR 2.0-3.0)    Recent labs: (last 7 days)     02/01/24  1610   INR 2.3*       ASSESSMENT     Source(s): Chart Review and Patient/Caregiver Call     Warfarin doses taken: Warfarin taken as instructed  Diet: No new diet changes identified  Medication/supplement changes: None noted  New illness, injury, or hospitalization: No  Signs or symptoms of bleeding or clotting: No  Previous result: Therapeutic last visit; previously outside of goal range  Additional findings: None       PLAN     Recommended plan for no diet, medication or health factor changes affecting INR     Dosing Instructions: Continue your current warfarin dose with next INR in 4 weeks       Summary  As of 2/1/2024      Full warfarin instructions:  2 mg every Sun, Tue, Thu; 1 mg all other days   Next INR check:  2/29/2024               Telephone call with Ginna who verbalizes understanding and agrees to plan    Patient offered & declined to schedule next visit    Education provided:   Goal range and lab monitoring: goal range and significance of current result  Contact [phone] with any changes, questions or concerns.     Plan made per [facility] anticoagulation protocol    Bailey Edgar, RN  Anticoagulation Clinic  2/1/2024    _______________________________________________________________________     Anticoagulation Episode Summary       Current INR goal:  2.0-3.0   TTR:  83.3% (1 y)   Target end date:  Indefinite   Send INR reminders to:  PATRICIA BLACKBURN    Indications    Long term current use of anticoagulant therapy [Z79.01]  Paroxysmal atrial fibrillation (H) [I48.0]  History of pulmonary embolus (PE) [Z86.711]             Comments:               Anticoagulation Care Providers       Provider Role Specialty Phone number    Dawson Cloud MD Referring Internal Medicine [phone]

## 2024-02-19 DIAGNOSIS — I10 ESSENTIAL HYPERTENSION: ICD-10-CM

## 2024-02-19 RX ORDER — FUROSEMIDE 40 MG
TABLET ORAL
Qty: 90 TABLET | Refills: 0 | Status: SHIPPED | OUTPATIENT
Start: 2024-02-19 | End: 2024-04-26

## 2024-02-19 RX ORDER — METOPROLOL SUCCINATE 100 MG/1
150 TABLET, EXTENDED RELEASE ORAL DAILY
Qty: 135 TABLET | Refills: 0 | Status: SHIPPED | OUTPATIENT
Start: 2024-02-19 | End: 2024-04-26

## 2024-02-20 DIAGNOSIS — Z86.711 HISTORY OF PULMONARY EMBOLUS (PE): ICD-10-CM

## 2024-02-20 DIAGNOSIS — M80.00XD AGE-RELATED OSTEOPOROSIS WITH CURRENT PATHOLOGICAL FRACTURE WITH ROUTINE HEALING: ICD-10-CM

## 2024-02-20 DIAGNOSIS — Z79.01 LONG TERM CURRENT USE OF ANTICOAGULANT THERAPY: ICD-10-CM

## 2024-02-20 RX ORDER — WARFARIN SODIUM 2 MG/1
1-2 TABLET ORAL DAILY
Qty: 90 TABLET | Refills: 1 | Status: SHIPPED | OUTPATIENT
Start: 2024-02-20

## 2024-02-20 RX ORDER — ALENDRONATE SODIUM 70 MG/1
70 TABLET ORAL
Qty: 12 TABLET | Refills: 0 | Status: SHIPPED | OUTPATIENT
Start: 2024-02-20 | End: 2024-04-26

## 2024-02-29 ENCOUNTER — DOCUMENTATION ONLY (OUTPATIENT)
Dept: OTHER | Facility: CLINIC | Age: 89
End: 2024-02-29

## 2024-02-29 ENCOUNTER — ANTICOAGULATION THERAPY VISIT (OUTPATIENT)
Dept: ANTICOAGULATION | Facility: CLINIC | Age: 89
End: 2024-02-29

## 2024-02-29 ENCOUNTER — LAB (OUTPATIENT)
Dept: LAB | Facility: CLINIC | Age: 89
End: 2024-02-29
Payer: COMMERCIAL

## 2024-02-29 DIAGNOSIS — Z86.711 HISTORY OF PULMONARY EMBOLUS (PE): ICD-10-CM

## 2024-02-29 DIAGNOSIS — Z79.01 LONG TERM CURRENT USE OF ANTICOAGULANT THERAPY: Primary | ICD-10-CM

## 2024-02-29 DIAGNOSIS — I48.0 PAROXYSMAL ATRIAL FIBRILLATION (H): ICD-10-CM

## 2024-02-29 DIAGNOSIS — Z79.01 LONG TERM CURRENT USE OF ANTICOAGULANT THERAPY: ICD-10-CM

## 2024-02-29 LAB — INR BLD: 2.1 (ref 0.9–1.1)

## 2024-02-29 PROCEDURE — 85610 PROTHROMBIN TIME: CPT

## 2024-02-29 PROCEDURE — 36416 COLLJ CAPILLARY BLOOD SPEC: CPT

## 2024-02-29 NOTE — PROGRESS NOTES
ANTICOAGULATION MANAGEMENT     Ginna E Junaid 89 year old female is on warfarin with therapeutic INR result. (Goal INR 2.0-3.0)    Recent labs: (last 7 days)     02/29/24  1451   INR 2.1*       ASSESSMENT     Source(s): Chart Review and Patient/Caregiver Call     Warfarin doses taken: Warfarin taken as instructed  Diet: No new diet changes identified  Medication/supplement changes: None noted  New illness, injury, or hospitalization: No  Signs or symptoms of bleeding or clotting: No  Previous result: Therapeutic last 2(+) visits  Additional findings: None       PLAN     Recommended plan for no diet, medication or health factor changes affecting INR     Dosing Instructions: Continue your current warfarin dose with next INR in 5 weeks       Summary  As of 2/29/2024      Full warfarin instructions:  2 mg every Sun, Tue, Thu; 1 mg all other days   Next INR check:  4/4/2024               Telephone call with Ginna who verbalizes understanding and agrees to plan    Patient offered & declined to schedule next visit    Education provided:   Please call back if any changes to your diet, medications or how you've been taking warfarin  Goal range and lab monitoring: goal range and significance of current result and Importance of therapeutic range    Plan made per [facility] anticoagulation protocol    Gretta Maxwell RN  Anticoagulation Clinic  2/29/2024    _______________________________________________________________________     Anticoagulation Episode Summary       Current INR goal:  2.0-3.0   TTR:  83.9% (1 y)   Target end date:  Indefinite   Send INR reminders to:  PATRICIA BLACKBURN    Indications    Long term current use of anticoagulant therapy [Z79.01]  Paroxysmal atrial fibrillation (H) [I48.0]  History of pulmonary embolus (PE) [Z86.711]             Comments:               Anticoagulation Care Providers       Provider Role Specialty Phone number    Dawson Cloud MD Referring Internal Medicine [phone]

## 2024-03-03 ENCOUNTER — HEALTH MAINTENANCE LETTER (OUTPATIENT)
Age: 89
End: 2024-03-03

## 2024-03-21 DIAGNOSIS — I10 ESSENTIAL HYPERTENSION: ICD-10-CM

## 2024-03-21 RX ORDER — VALSARTAN 160 MG/1
160 TABLET ORAL DAILY
Qty: 90 TABLET | Refills: 0 | Status: SHIPPED | OUTPATIENT
Start: 2024-03-21 | End: 2024-04-15

## 2024-04-04 ENCOUNTER — ANTICOAGULATION THERAPY VISIT (OUTPATIENT)
Dept: ANTICOAGULATION | Facility: CLINIC | Age: 89
End: 2024-04-04

## 2024-04-04 ENCOUNTER — LAB (OUTPATIENT)
Dept: LAB | Facility: CLINIC | Age: 89
End: 2024-04-04
Payer: COMMERCIAL

## 2024-04-04 DIAGNOSIS — I48.0 PAROXYSMAL ATRIAL FIBRILLATION (H): ICD-10-CM

## 2024-04-04 DIAGNOSIS — Z79.01 LONG TERM CURRENT USE OF ANTICOAGULANT THERAPY: ICD-10-CM

## 2024-04-04 DIAGNOSIS — Z86.711 HISTORY OF PULMONARY EMBOLUS (PE): ICD-10-CM

## 2024-04-04 DIAGNOSIS — Z79.01 LONG TERM CURRENT USE OF ANTICOAGULANT THERAPY: Primary | ICD-10-CM

## 2024-04-04 LAB — INR BLD: 4.2 (ref 0.9–1.1)

## 2024-04-04 PROCEDURE — 36416 COLLJ CAPILLARY BLOOD SPEC: CPT

## 2024-04-04 PROCEDURE — 85610 PROTHROMBIN TIME: CPT

## 2024-04-04 NOTE — PROGRESS NOTES
ANTICOAGULATION MANAGEMENT     Ginna SCHNEIDER Junaid 89 year old female is on warfarin with supratherapeutic INR result. (Goal INR 2.0-3.0)    Recent labs: (last 7 days)     04/04/24  1256   INR 4.2*       ASSESSMENT     Source(s): Chart Review and Patient/Caregiver Call     Warfarin doses taken: Warfarin taken as instructed  Diet: No new diet changes identified  Medication/supplement changes: None noted  New illness, injury, or hospitalization: Yes: had a fall about a week ago. Denies significant bruising but still having some pain.   Signs or symptoms of bleeding or clotting: No  Previous result: Therapeutic last 2(+) visits  Additional findings: None and patients grandson in the  was killed a couple weeks ago, so she has been very sad/stressed which could possibly be impacting INR some today.   Since she has been very stable on this dose, we will hold and continue today. Patient knows that if INR remains elevated, we will adjust dose at next check.        PLAN     Recommended plan for temporary change(s) affecting INR     Dosing Instructions: hold dose then continue your current warfarin dose with next INR in 7-10 days       Summary  As of 4/4/2024      Full warfarin instructions:  4/4: Hold; Otherwise 2 mg every Sun, Thu; 1 mg all other days   Next INR check:  4/15/2024               Telephone call with Ginna who agrees to plan and repeated back plan correctly    Patient offered & declined to schedule next visit - will need to find a ride, but agreed to recheck by 4/12 or 4/15    Education provided:   Goal range and lab monitoring: goal range and significance of current result and Importance of following up at instructed interval  Symptom monitoring: monitoring for bleeding signs and symptoms    Plan made per [facility] anticoagulation protocol    Dixie Cordoba, RN  Anticoagulation Clinic  4/4/2024    _______________________________________________________________________     Anticoagulation Episode Summary        Current INR goal:  2.0-3.0   TTR:  84.1% (1 y)   Target end date:  Indefinite   Send INR reminders to:  PATRICIA BLACKBURN    Indications    Long term current use of anticoagulant therapy [Z79.01]  Paroxysmal atrial fibrillation (H) [I48.0]  History of pulmonary embolus (PE) [Z86.711]             Comments:               Anticoagulation Care Providers       Provider Role Specialty Phone number    Dawson Cloud MD Referring Internal Medicine [phone]

## 2024-04-12 ENCOUNTER — ANTICOAGULATION THERAPY VISIT (OUTPATIENT)
Dept: ANTICOAGULATION | Facility: CLINIC | Age: 89
End: 2024-04-12

## 2024-04-12 ENCOUNTER — LAB (OUTPATIENT)
Dept: LAB | Facility: CLINIC | Age: 89
End: 2024-04-12
Payer: COMMERCIAL

## 2024-04-12 DIAGNOSIS — I48.0 PAROXYSMAL ATRIAL FIBRILLATION (H): ICD-10-CM

## 2024-04-12 DIAGNOSIS — Z86.711 HISTORY OF PULMONARY EMBOLUS (PE): ICD-10-CM

## 2024-04-12 DIAGNOSIS — Z79.01 LONG TERM CURRENT USE OF ANTICOAGULANT THERAPY: Primary | ICD-10-CM

## 2024-04-12 DIAGNOSIS — Z79.01 LONG TERM CURRENT USE OF ANTICOAGULANT THERAPY: ICD-10-CM

## 2024-04-12 LAB — INR BLD: 3.1 (ref 0.9–1.1)

## 2024-04-12 PROCEDURE — 36416 COLLJ CAPILLARY BLOOD SPEC: CPT

## 2024-04-12 PROCEDURE — 85610 PROTHROMBIN TIME: CPT

## 2024-04-12 NOTE — PROGRESS NOTES
ANTICOAGULATION MANAGEMENT     Ginnakayli Quiroga 89 year old female is on warfarin with supratherapeutic INR result. (Goal INR 2.0-3.0)    Recent labs: (last 7 days)     04/12/24  1344   INR 3.1*       ASSESSMENT     Warfarin Lab Questionnaire    Warfarin Doses Last 7 Days      4/12/2024     1:39 PM   Dose in Tablet or Mg   TAB or MG? milligram (mg)     Pt Rptd Dose SUNDAY MONDAY TUESDAY WED THURS FRIDAY SATURDAY 4/12/2024   1:39 PM 2 1 2 1 2 1 1         4/12/2024   Warfarin Lab Questionnaire   Missed doses within past 14 days? Yes   If yes; please list when: tthursday last week   Changes in diet or alcohol within past 14 days? No   Medication changes since last result? No   Injuries or illness since last result? No   New shortness of breath, severe headaches or sudden changes in vision since last result? No   Abnormal bleeding since last result? No   Upcoming surgery, procedure? No   Best number to call with results? [phone]     Previous result: Supratherapeutic  Additional findings:  She is eating less junk food but not greens. She already has a salad out for dinner. Prefers to continue dose and recheck in two weeks.        PLAN     Recommended plan for temporary change(s) affecting INR     Dosing Instructions: Continue your current warfarin dose with next INR in 1-2 weeks       Summary  As of 4/12/2024      Full warfarin instructions:  2 mg every Sun, Tue, Thu; 1 mg all other days   Next INR check:  4/26/2024               Telephone call with Ginna who agrees to plan and repeated back plan correctly    Check at provider office visit    Education provided:   Please call back if any changes to your diet, medications or how you've been taking warfarin  Goal range and lab monitoring: goal range and significance of current result and Importance of therapeutic range    Plan made per [facility] anticoagulation protocol    Gretta Maxwell, GUNNAR  Anticoagulation  Clinic  4/12/2024    _______________________________________________________________________     Anticoagulation Episode Summary       Current INR goal:  2.0-3.0   TTR:  81.9% (1 y)   Target end date:  Indefinite   Send INR reminders to:  PATRICIA BLACKBURN    Indications    Long term current use of anticoagulant therapy [Z79.01]  Paroxysmal atrial fibrillation (H) [I48.0]  History of pulmonary embolus (PE) [Z86.711]             Comments:               Anticoagulation Care Providers       Provider Role Specialty Phone number    Dawson Cloud MD Referring Internal Medicine [phone]

## 2024-04-15 ENCOUNTER — OFFICE VISIT (OUTPATIENT)
Dept: INTERNAL MEDICINE | Facility: CLINIC | Age: 89
End: 2024-04-15
Payer: COMMERCIAL

## 2024-04-15 ENCOUNTER — ANCILLARY PROCEDURE (OUTPATIENT)
Dept: GENERAL RADIOLOGY | Facility: CLINIC | Age: 89
End: 2024-04-15
Attending: INTERNAL MEDICINE
Payer: COMMERCIAL

## 2024-04-15 VITALS
DIASTOLIC BLOOD PRESSURE: 80 MMHG | SYSTOLIC BLOOD PRESSURE: 148 MMHG | RESPIRATION RATE: 18 BRPM | OXYGEN SATURATION: 95 % | BODY MASS INDEX: 25.33 KG/M2 | HEART RATE: 77 BPM | WEIGHT: 152 LBS | HEIGHT: 65 IN | TEMPERATURE: 98.1 F

## 2024-04-15 DIAGNOSIS — M81.0 AGE RELATED OSTEOPOROSIS, UNSPECIFIED PATHOLOGICAL FRACTURE PRESENCE: ICD-10-CM

## 2024-04-15 DIAGNOSIS — M54.50 ACUTE BILATERAL LOW BACK PAIN WITHOUT SCIATICA: Primary | ICD-10-CM

## 2024-04-15 DIAGNOSIS — M54.50 ACUTE BILATERAL LOW BACK PAIN WITHOUT SCIATICA: ICD-10-CM

## 2024-04-15 DIAGNOSIS — I10 ESSENTIAL HYPERTENSION: ICD-10-CM

## 2024-04-15 DIAGNOSIS — I48.0 PAROXYSMAL ATRIAL FIBRILLATION (H): ICD-10-CM

## 2024-04-15 PROCEDURE — 72100 X-RAY EXAM L-S SPINE 2/3 VWS: CPT | Performed by: RADIOLOGY

## 2024-04-15 PROCEDURE — 99214 OFFICE O/P EST MOD 30 MIN: CPT | Performed by: INTERNAL MEDICINE

## 2024-04-15 PROCEDURE — G2211 COMPLEX E/M VISIT ADD ON: HCPCS | Performed by: INTERNAL MEDICINE

## 2024-04-15 RX ORDER — MULTIVITAMIN
1 TABLET ORAL DAILY
COMMUNITY

## 2024-04-15 RX ORDER — RESPIRATORY SYNCYTIAL VIRUS VACCINE 120MCG/0.5
0.5 KIT INTRAMUSCULAR ONCE
Qty: 1 EACH | Refills: 0 | Status: CANCELLED | OUTPATIENT
Start: 2024-04-15 | End: 2024-04-15

## 2024-04-15 RX ORDER — ACETAMINOPHEN 500 MG
1000 TABLET ORAL 3 TIMES DAILY PRN
COMMUNITY
Start: 2024-04-15

## 2024-04-15 RX ORDER — VALSARTAN 160 MG/1
320 TABLET ORAL DAILY
Status: SHIPPED
Start: 2024-04-15 | End: 2024-04-26

## 2024-04-15 RX ORDER — LIDOCAINE 4 G/G
2 PATCH TOPICAL EVERY 24 HOURS
COMMUNITY
Start: 2024-04-15 | End: 2024-04-26

## 2024-04-15 RX ORDER — VITAMIN B COMPLEX
1 TABLET ORAL DAILY
COMMUNITY

## 2024-04-15 NOTE — PROGRESS NOTES
Assessment & Plan     Acute bilateral low back pain without sciatica  89-year-old woman here with acute low back pain following a fall/injury several weeks ago.  Her leg gave out and she ended up falling onto her back.  This was at least several weeks ago and she is having ongoing intermittent pain across her lower back that seems to move around alternating pain on the left side and right side.  Nothing radiating into her buttock or down her leg.  She does have osteoporosis and is at risk for vertebral compression fracture and will obtain a lumbar spine x-ray to rule this out.  However, this is most likely musculoligamentous pain in her low back.  If her x-ray is negative, I would recommend referral to physical therapy.  In the meantime she can continue acetaminophen but increase the dose to 1000 mg 3 times daily as needed and she can try topical treatments including Lidoderm patches 12 hours on and 12 hours off.  - XR Lumbar Spine 2/3 Views; Future  - acetaminophen (TYLENOL) 500 MG tablet; Take 2 tablets (1,000 mg) by mouth 3 times daily as needed for mild pain  - Lidocaine (LIDOCARE) 4 % Patch; Place 2 patches onto the skin every 24 hours To prevent lidocaine toxicity, patient should be patch free for 12 hrs daily.    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  Running elevated at home as well.  Needs to watch sodium better.  Will increase valsartan to 320 mg daily.  - valsartan (DIOVAN) 160 MG tablet; Take 2 tablets (320 mg) by mouth daily    Paroxysmal atrial fibrillation (H)  Chronically anticoagulated with warfarin.  Recommending that she check her INR more frequently if she is increasing her acetaminophen dose.  Retroperitoneal hematoma a consideration being anticoagulated but I would expect symptoms to be more persistent and not  "exacerbated by movement as she is currently experiencing    The longitudinal plan of care for the diagnosis(es)/condition(s) as documented were addressed during this visit. Due to the added complexity in care, I will continue to support Ginna in the subsequent management and with ongoing continuity of care.         BMI  Estimated body mass index is 25.29 kg/m  as calculated from the following:    Height as of this encounter: 1.651 m (5' 5\").    Weight as of this encounter: 68.9 kg (152 lb).         Follow-up in 2 weeks for annual wellness visit    Subjective   Ginna is a 89 year old, presenting for the following health issues: Here to discuss new pain involving low back and to follow-up chronic medical problems including hypertension and paroxysmal atrial fibrillation.  See assessment and plan for details.  Follow Up (Fall X 1 month ago)        4/15/2024     7:49 AM   Additional Questions   Roomed by      History of Present Illness       Back Pain:  She presents for follow up of back pain. Patient's back pain is a new problem.    Original cause of back pain: a fall  First noticed back pain: more than 1 month ago  Patient feels back pain: comes and goesLocation of back pain:  Right lower back, left lower back, right middle of back, left middle of back, right upper back and left upper back  Description of back pain: fullness  Back pain spreads: nowhere    Since patient first noticed back pain, pain is: always present, but gets better and worse  Does back pain interfere with her job:  Not applicable  On a scale of 1-10 (10 being the worst), patient describes pain as:  6  What makes back pain worse: certain positions and sitting   Acupuncture: not tried  Acetaminophen: helpful  Activity or exercise: not tried  Chiropractor:  Not tried  Cold: not tried  Heat: helpful  Massage: not tried  Muscle relaxants: not tried  NSAIDS: not tried  Opioids: not tried  Physical Therapy: not tried  Rest: helpful  Steroid " "Injection: not tried  Stretching: not tried  Surgery: not tried  TENS unit: not tried  Topical pain relievers: not tried  Other healthcare providers patient is seeing for back pain: None    She eats 2-3 servings of fruits and vegetables daily.She consumes 0 sweetened beverage(s) daily.She exercises with enough effort to increase her heart rate 9 or less minutes per day.  She exercises with enough effort to increase her heart rate 3 or less days per week.   She is taking medications regularly.                 Review of Systems  Constitutional, HEENT, cardiovascular, pulmonary, gi and gu systems are negative, except as otherwise noted.      Objective    BP (!) 148/80   Pulse 77   Temp 98.1  F (36.7  C) (Oral)   Resp 18   Ht 1.651 m (5' 5\")   Wt 68.9 kg (152 lb)   LMP  (LMP Unknown)   SpO2 95%   Breastfeeding No   BMI 25.29 kg/m    Body mass index is 25.29 kg/m .  Physical Exam   Well-appearing elderly woman  No reproducible tenderness with palpation over the lumbar spine nor over paravertebral musculature          Signed Electronically by: Dawson Cloud MD    "

## 2024-04-23 SDOH — HEALTH STABILITY: PHYSICAL HEALTH: ON AVERAGE, HOW MANY MINUTES DO YOU ENGAGE IN EXERCISE AT THIS LEVEL?: 0 MIN

## 2024-04-23 SDOH — HEALTH STABILITY: PHYSICAL HEALTH: ON AVERAGE, HOW MANY DAYS PER WEEK DO YOU ENGAGE IN MODERATE TO STRENUOUS EXERCISE (LIKE A BRISK WALK)?: 0 DAYS

## 2024-04-23 ASSESSMENT — SOCIAL DETERMINANTS OF HEALTH (SDOH): HOW OFTEN DO YOU GET TOGETHER WITH FRIENDS OR RELATIVES?: MORE THAN THREE TIMES A WEEK

## 2024-04-23 NOTE — COMMUNITY RESOURCES LIST (ENGLISH)
April 23, 2024           YOUR PERSONALIZED LIST OF SERVICES & PROGRAMS           & RECREATION    [employer] - Sports clubs and recreational activities - [facility]  [address] [place] [address] (Distance: 1.0 miles)  Language: English  Fee: Self pay, Sliding scale      [facility] - Adult Enrichment  Phone: [phone]  Website: [email or web address]  Language: English  Hours: Mon 7:30 AM - 4:00 PM Tue 7:30 AM - 4:00 PM Wed 7:30 AM - 4:00 PM Thu 7:30 AM - 4:00 PM Fri 7:30 AM - 4:00 PM      LEAGUE - [employer] LEAGUE BASEBALL AND SOFTBALL  Website: [email or web address].Knip.[email or web address]    Classes/Groups      [facility]  [address] [address] (Distance: 1.0 miles)  Language: English      [facility] fitness classes - [facility]  [address] [address] (Distance: 1.0 miles)  Language: English  Fee: Free, Self pay, Sliding scale      [facility] - Adult Enrichment  Phone: [phone]  Website: [email or web address]  Language: English  Hours: Mon 7:30 AM - 4:00 PM Tue 7:30 AM - 4:00 PM Wed 7:30 AM - 4:00 PM Thu 7:30 AM - 4:00 PM Fri 7:30 AM - 4:00 PM               IMPORTANT NUMBERS & WEBSITES        Emergency Services  911  .   [employer] Way  211 http://211unitedway.org  .   Poison Control  (685) 439-5043 http://[place]poison.org http://w[place]poison.org  .     Suicide and Crisis Lifeline  988 http://988lifeline.org  .   Childhelp National Child Abuse Hotline  462.518.1083 http://Childhelphotline.org   .   National Sexual Assault Hotline  (124) 472-8070 (HOPE) http://Rainn.org   .     National Runaway Safeline  (179) 381-6130 (RUNAWAY) http://Condition OneruBrowsarity.org  .   Pregnancy & Postpartum Support  Call/text 221-110-8410  [place]: http://ppsupportmn.org  [place]: http://psichapters.com/wi  .   Substance Abuse National  Helpline ([facility])  083-485-HELP (9363) http://Findtreatment.gov   .                DISCLAIMER: These resources have been generated via the [employer] Platform. [employer] does not endorse any service providers mentioned in this resource list. [employer] does not guarantee that the services mentioned in this resource list will be available to you or will improve your health or wellness.    [facility]

## 2024-04-26 ENCOUNTER — ANTICOAGULATION THERAPY VISIT (OUTPATIENT)
Dept: ANTICOAGULATION | Facility: CLINIC | Age: 89
End: 2024-04-26

## 2024-04-26 ENCOUNTER — OFFICE VISIT (OUTPATIENT)
Dept: INTERNAL MEDICINE | Facility: CLINIC | Age: 89
End: 2024-04-26
Payer: COMMERCIAL

## 2024-04-26 VITALS
WEIGHT: 155 LBS | SYSTOLIC BLOOD PRESSURE: 170 MMHG | RESPIRATION RATE: 18 BRPM | HEIGHT: 64 IN | BODY MASS INDEX: 26.46 KG/M2 | OXYGEN SATURATION: 98 % | HEART RATE: 79 BPM | DIASTOLIC BLOOD PRESSURE: 96 MMHG | TEMPERATURE: 98.2 F

## 2024-04-26 DIAGNOSIS — Z95.0 STATUS POST BIVENTRICULAR CARDIAC PACEMAKER INSERTION: ICD-10-CM

## 2024-04-26 DIAGNOSIS — Z51.81 ENCOUNTER FOR THERAPEUTIC DRUG MONITORING: ICD-10-CM

## 2024-04-26 DIAGNOSIS — M15.0 PRIMARY OSTEOARTHRITIS INVOLVING MULTIPLE JOINTS: ICD-10-CM

## 2024-04-26 DIAGNOSIS — N18.30 CKD (CHRONIC KIDNEY DISEASE) STAGE 3, GFR 30-59 ML/MIN (H): ICD-10-CM

## 2024-04-26 DIAGNOSIS — N18.31 STAGE 3A CHRONIC KIDNEY DISEASE (H): ICD-10-CM

## 2024-04-26 DIAGNOSIS — Z79.01 LONG TERM CURRENT USE OF ANTICOAGULANT THERAPY: Primary | ICD-10-CM

## 2024-04-26 DIAGNOSIS — G31.84 MILD COGNITIVE IMPAIRMENT: ICD-10-CM

## 2024-04-26 DIAGNOSIS — Z86.711 HISTORY OF PULMONARY EMBOLUS (PE): ICD-10-CM

## 2024-04-26 DIAGNOSIS — Z00.00 ENCOUNTER FOR MEDICARE ANNUAL WELLNESS EXAM: Primary | ICD-10-CM

## 2024-04-26 DIAGNOSIS — Z13.220 LIPID SCREENING: ICD-10-CM

## 2024-04-26 DIAGNOSIS — M54.50 ACUTE BILATERAL LOW BACK PAIN WITHOUT SCIATICA: ICD-10-CM

## 2024-04-26 DIAGNOSIS — Z79.01 LONG TERM CURRENT USE OF ANTICOAGULANT THERAPY: ICD-10-CM

## 2024-04-26 DIAGNOSIS — I48.0 PAROXYSMAL ATRIAL FIBRILLATION (H): ICD-10-CM

## 2024-04-26 DIAGNOSIS — R60.0 PERIPHERAL EDEMA: ICD-10-CM

## 2024-04-26 DIAGNOSIS — H40.10X0 OPEN-ANGLE GLAUCOMA OF BOTH EYES, UNSPECIFIED GLAUCOMA STAGE, UNSPECIFIED OPEN-ANGLE GLAUCOMA TYPE: ICD-10-CM

## 2024-04-26 DIAGNOSIS — E55.9 VITAMIN D DEFICIENCY: ICD-10-CM

## 2024-04-26 DIAGNOSIS — I49.5 TACHY-BRADY SYNDROME (H): ICD-10-CM

## 2024-04-26 DIAGNOSIS — M81.0 AGE-RELATED OSTEOPOROSIS WITHOUT CURRENT PATHOLOGICAL FRACTURE: ICD-10-CM

## 2024-04-26 DIAGNOSIS — I10 ESSENTIAL HYPERTENSION: ICD-10-CM

## 2024-04-26 LAB
ALBUMIN UR-MCNC: NEGATIVE MG/DL
APPEARANCE UR: CLEAR
BILIRUB UR QL STRIP: NEGATIVE
COLOR UR AUTO: YELLOW
ERYTHROCYTE [DISTWIDTH] IN BLOOD BY AUTOMATED COUNT: 14.5 % (ref 10–15)
GLUCOSE UR STRIP-MCNC: NEGATIVE MG/DL
HCT VFR BLD AUTO: 39.7 % (ref 35–47)
HGB BLD-MCNC: 12.9 G/DL (ref 11.7–15.7)
HGB UR QL STRIP: NEGATIVE
INR BLD: 2.5 (ref 0.9–1.1)
KETONES UR STRIP-MCNC: NEGATIVE MG/DL
LEUKOCYTE ESTERASE UR QL STRIP: NEGATIVE
MCH RBC QN AUTO: 28.7 PG (ref 26.5–33)
MCHC RBC AUTO-ENTMCNC: 32.5 G/DL (ref 31.5–36.5)
MCV RBC AUTO: 88 FL (ref 78–100)
NITRATE UR QL: NEGATIVE
PH UR STRIP: 7 [PH] (ref 5–8)
PLATELET # BLD AUTO: 199 10E3/UL (ref 150–450)
RBC # BLD AUTO: 4.5 10E6/UL (ref 3.8–5.2)
SP GR UR STRIP: 1.02 (ref 1–1.03)
UROBILINOGEN UR STRIP-ACNC: 0.2 E.U./DL
WBC # BLD AUTO: 7.2 10E3/UL (ref 4–11)

## 2024-04-26 PROCEDURE — 36415 COLL VENOUS BLD VENIPUNCTURE: CPT | Performed by: INTERNAL MEDICINE

## 2024-04-26 PROCEDURE — 36416 COLLJ CAPILLARY BLOOD SPEC: CPT | Performed by: INTERNAL MEDICINE

## 2024-04-26 PROCEDURE — 80061 LIPID PANEL: CPT | Performed by: INTERNAL MEDICINE

## 2024-04-26 PROCEDURE — 85027 COMPLETE CBC AUTOMATED: CPT | Performed by: INTERNAL MEDICINE

## 2024-04-26 PROCEDURE — 84443 ASSAY THYROID STIM HORMONE: CPT | Performed by: INTERNAL MEDICINE

## 2024-04-26 PROCEDURE — 80053 COMPREHEN METABOLIC PANEL: CPT | Performed by: INTERNAL MEDICINE

## 2024-04-26 PROCEDURE — 85610 PROTHROMBIN TIME: CPT | Performed by: INTERNAL MEDICINE

## 2024-04-26 PROCEDURE — 99214 OFFICE O/P EST MOD 30 MIN: CPT | Mod: 25 | Performed by: INTERNAL MEDICINE

## 2024-04-26 PROCEDURE — 82306 VITAMIN D 25 HYDROXY: CPT | Performed by: INTERNAL MEDICINE

## 2024-04-26 PROCEDURE — 83735 ASSAY OF MAGNESIUM: CPT | Performed by: INTERNAL MEDICINE

## 2024-04-26 PROCEDURE — 81003 URINALYSIS AUTO W/O SCOPE: CPT | Performed by: INTERNAL MEDICINE

## 2024-04-26 PROCEDURE — G0439 PPPS, SUBSEQ VISIT: HCPCS | Performed by: INTERNAL MEDICINE

## 2024-04-26 RX ORDER — METOPROLOL SUCCINATE 100 MG/1
150 TABLET, EXTENDED RELEASE ORAL DAILY
Qty: 135 TABLET | Refills: 3 | Status: SHIPPED | OUTPATIENT
Start: 2024-04-26

## 2024-04-26 RX ORDER — VALSARTAN 320 MG/1
320 TABLET ORAL DAILY
Qty: 90 TABLET | Refills: 3 | Status: SHIPPED | OUTPATIENT
Start: 2024-04-26

## 2024-04-26 RX ORDER — FUROSEMIDE 40 MG
TABLET ORAL
Qty: 90 TABLET | Refills: 3 | Status: SHIPPED | OUTPATIENT
Start: 2024-04-26

## 2024-04-26 RX ORDER — ALENDRONATE SODIUM 70 MG/1
70 TABLET ORAL
Qty: 12 TABLET | Refills: 3 | Status: SHIPPED | OUTPATIENT
Start: 2024-04-26

## 2024-04-26 NOTE — PROGRESS NOTES
Preventive Care Visit  [facility]  Dawson Cloud MD, Internal Medicine  Apr 26, 2024      Assessment & Plan     Encounter for Medicare annual wellness exam  Immunizations are reviewed and recommending getting Shingrix completed along with an RSV vaccine and a Td booster.  We also discussed annual flu shot and COVID vaccination.  She has a living well.  Non-smoker.  Uses alcohol in moderation.  Regular exercise and good diet habits to maintain a healthy weight discussed.  We discussed getting a pedal machine.  Further colonoscopies for colon cancer screening are not recommended at her age as risk of procedure outweigh benefit.  She declines any further mammograms for breast cancer screening.  DEXA should be repeated after she has completed 5 years of alendronate.  Dementia and depression screening completed.  She is seeing her eye doctor every 6 months.  Recommending seeing a dentist every 6 months.  Skin exam performed and recommending regular use of sunblock.  Will screen for diabetes with fasting glucose.     Paroxysmal atrial fibrillation (H)  She continues on 150 mg of Toprol-XL and is anticoagulated with warfarin.  - TSH with free T4 reflex; Future    Tachy-kayleen syndrome (H)  History of paroxysmal atrial fibrillation with ablation and subsequent placement of permanent pacemaker    Status post biventricular cardiac pacemaker insertion  Interrogation of pacemaker every 3 months    History of pulmonary embolus (PE)  Chronically anticoagulated with warfarin.  INR should be rechecked today    Essential hypertension  Blood pressure is not at goal.  170/96 even when rechecked.  I am asking her and her son to monitor her blood pressure at home over the next 2 weeks and send me results.  Valsartan was recently increased to 320 mg and she continues on 150 mg of Toprol-XL.  She takes Lasix daily.  Consider adding amlodipine 5 mg daily.  We stressed the importance of better blood pressure  control to lower her risk for cardiovascular disease including stroke  - furosemide (LASIX) 40 MG tablet; TAKE 1 TABLET(40 MG) BY MOUTH DAILY.  - metoprolol succinate ER (TOPROL XL) 100 MG 24 hr tablet; Take 1.5 tablets (150 mg) by mouth daily  - valsartan (DIOVAN) 320 MG tablet; Take 1 tablet (320 mg) by mouth daily  - CBC with platelets; Future  - Comprehensive metabolic panel (BMP + Alb, Alk Phos, ALT, AST, Total. Bili, TP); Future  - UA Macroscopic with reflex to Microscopic and Culture - Lab Collect; Future  - TSH with free T4 reflex; Future    Stage 3a chronic kidney disease (H)  Monitoring renal function.  She should avoid NSAIDs.  Maintain good hydration.  - Comprehensive metabolic panel (BMP + Alb, Alk Phos, ALT, AST, Total. Bili, TP); Future  - UA Macroscopic with reflex to Microscopic and Culture - Lab Collect; Future    Age-related osteoporosis without current pathological fracture  She has been on alendronate since November 2020.  Tolerating medication without side effects.  Trying to maintain good calcium and vitamin D intake.  Will plan to repeat a DEXA when she has completed 5 years.  - alendronate (FOSAMAX) 70 MG tablet; Take 1 tablet (70 mg) by mouth every 7 days    Vitamin D deficiency  Rechecking vitamin D level on replacement  - Vitamin D deficiency screening; Future    Peripheral edema  Edema is well-controlled with furosemide and sodium restriction  - furosemide (LASIX) 40 MG tablet; TAKE 1 TABLET(40 MG) BY MOUTH DAILY.    Primary osteoarthritis involving multiple joints  She may use Tylenol as needed    Mild cognitive impairment  Some mild forgetfulness as described by her son but still completing her own finances and cooking for herself.  No longer driving  - TSH with free T4 reflex; Future    Long term current use of anticoagulant therapy      Open-angle glaucoma of both eyes, unspecified glaucoma stage, unspecified open-angle glaucoma type  Followed by ophthalmology every 6 months    Acute  "bilateral low back pain without sciatica  Recent acute low back pain has essentially resolved.  X-ray without vertebral fracture    Encounter for therapeutic drug monitoring  Monitor electrolytes while on diuretic  - Magnesium; Future    Lipid screening    - Lipid Profile (Chol, Trig, HDL, LDL calc); Future    Patient has been advised of split billing requirements and indicates understanding: Yes          BMI  Estimated body mass index is 27.03 kg/m  as calculated from the following:    Height as of this encounter: 1.613 m (5' 3.5\").    Weight as of this encounter: 70.3 kg (155 lb).       Counseling  Appropriate preventive services were discussed with this patient, including applicable screening as appropriate for fall prevention, nutrition, physical activity, Tobacco-use cessation, weight loss and cognition.  Checklist reviewing preventive services available has been given to the patient.  Reviewed patient's diet, addressing concerns and/or questions.   Discussed possible causes of fatigue. The patient was provided with written information regarding signs of hearing loss.   Information on urinary incontinence and treatment options given to patient.       Follow-up in 3 months    Delon Chacko is a 89 year old, presenting for the following: Here for her annual wellness visit and to follow-up multiple chronic medical problems including paroxysmal atrial fibrillation and tachybradycardia syndrome, osteoporosis, stage IIIa chronic kidney disease and hypertension along with other issues.  See assessment and plan for details  Wellness Visit (Not fasting  -  No concerns)        4/26/2024     3:16 PM   Additional Questions   Accompanied by Bryce - Son         Health Care Directive  Patient has a Health Care Directive on file  Discussed advance care planning with patient.          4/23/2024   General Health   How would you rate your overall physical health? (!) FAIR   Feel stress (tense, anxious, or unable to sleep) " To some extent   (!) STRESS CONCERN      4/23/2024   Nutrition   Diet: Regular (no restrictions)         4/23/2024   Exercise   Days per week of moderate/strenous exercise 0 days   Average minutes spent exercising at this level 0 min   (!) EXERCISE CONCERN      4/23/2024   Social Factors   Frequency of gathering with friends or relatives More than three times a week   Worry food won't last until get money to buy more No   Food not last or not have enough money for food? No   Do you have housing?  Yes   Are you worried about losing your housing? No   Lack of transportation? No   Unable to get utilities (heat,electricity)? No         4/26/2024   Fall Risk   Fallen 2 or more times in the past year? No   Trouble with walking or balance? Yes           4/23/2024   Activities of Daily Living- Home Safety   Needs help with the following daily activites None of the above   Safety concerns in the home None of the above         4/23/2024   Dental   Dentist two times every year? Yes         4/23/2024   Hearing Screening   Hearing concerns? (!) I FEEL THAT PEOPLE ARE MUMBLING OR NOT SPEAKING CLEARLY.         4/23/2024   Driving Risk Screening   Patient/family members have concerns about driving No         4/23/2024   General Alertness/Fatigue Screening   Have you been more tired than usual lately? (!) YES         4/23/2024   Urinary Incontinence Screening   Bothered by leaking urine in past 6 months Yes         4/23/2024   TB Screening   Were you born outside of the [place]? No         Today's PHQ-2 Score:       4/26/2024     3:10 PM   PHQ-2 ( 1999 [employer])   Q1: Little interest or pleasure in doing things 0   Q2: Feeling down, depressed or hopeless 0   PHQ-2 Score 0   Q1: Little interest or pleasure in doing things Not at all   Q2: Feeling down, depressed or hopeless Not at all   PHQ-2 Score 0           4/23/2024   Substance Use   Alcohol more than 3/day or more than 7/wk No   Do you have a current opioid prescription? No   How  severe/bad is pain from 1 to 10? 2/10   Do you use any other substances recreationally? No     Social History     Tobacco Use    Smoking status: Never     Passive exposure: Never    Smokeless tobacco: Never   Vaping Use    Vaping status: Never Used   Substance Use Topics    Alcohol use: Yes     Comment: 0-1/day                    Reviewed and updated as needed this visit by Provider                    Past Medical History:   Diagnosis Date    Acute bilateral low back pain without sciatica 04/15/2024    Age-related osteoporosis with current pathological fracture with routine healing 11/09/2020    Pelvic fracture after fall.  New baseline DEXA with T score -1.2 but begin Fosamax No 2020. Previous DEXA 2018 T score -1.3    Atrial fibrillation with RVR (H)     Hospitalized with syncope found to have atrial fibrillation with uncontrolled ventricular rate.  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    Essential hypertension 11/28/2016    Facial skin lesion 03/18/2021    Suspicious for basal cell    Family history of colon cancer     Declines having colonoscopy    Glaucoma     History of pulmonary embolus (PE) 11/28/2016    Factor V Leiden mutation, family history pulmonary embolus    LBBB (left bundle branch block)     Left foot pain 01/29/2019    Mild cognitive impairment 12/23/2022    Multiple closed fractures of pelvis without disruption of pelvic ring with routine healing     Left superior inferior pubic ramus fracture following fall September 2020    Osteoarthritis of multiple joints 11/28/2016    Pacemaker     Paroxysmal atrial fibrillation (H) 11/09/2020    Syncopal episode with fall and pelvic fracture found to have paroxysmal atrial fibrillation with rapid ventricular rate.  Underwent ablation and placement of pacemaker     Peripheral edema 01/15/2018    Polyarthritis rheumatica (H)     Tachy-kayleen syndrome (H)     Vitamin D deficiency      Past Surgical History:   Procedure Laterality Date    IMPLANT PACEMAKER      AK DISCISSION,2ND CATARACT,LASER Left 02/03/2016    Procedure: LASER YAG CAPSULOTOMY, LEFT;  Surgeon: Jitendra Rick MD;  Location: [facility];  Service: Ophthalmology    AK DISCISSION,2ND CATARACT,LASER Right 02/17/2016    Procedure: LASER YAG CAPSULOTOMY, RIGHT;  Surgeon: Jitendra Rick MD;  Location: [facility];  Service: Ophthalmology     Current providers sharing in care for this patient include:  Patient Care Team:  Dawson Cloud MD as PCP - General (Internal Medicine)  Dawson Cloud MD as Assigned PCP    The following health maintenance items are reviewed in Epic and correct as of today:  Health Maintenance   Topic Date Due    RSV VACCINE (Pregnancy & 60+) (1 - 1-dose 60+ series) Never done    ZOSTER IMMUNIZATION (2 of 3) 02/26/2009    DTAP/TDAP/TD IMMUNIZATION (2 - Td or Tdap) 01/01/2023    INFLUENZA VACCINE (1) 09/01/2023    COVID-19 Vaccine (5 - 2023-24 season) 09/01/2023    LIPID  12/23/2023    ANNUAL REVIEW OF HM ORDERS  12/23/2023    BMP  03/02/2024    HEMOGLOBIN  12/23/2023    MEDICARE ANNUAL WELLNESS VISIT  04/26/2025    FALL RISK ASSESSMENT  04/26/2025    ADVANCE CARE PLANNING  02/28/2029    PHQ-2 (once per calendar year)  Completed    Pneumococcal Vaccine: 65+ Years  Completed    URINALYSIS  Completed    IPV IMMUNIZATION  Aged Out    HPV IMMUNIZATION  Aged Out    MENINGITIS IMMUNIZATION  Aged Out    RSV MONOCLONAL ANTIBODY  Aged Out    MICROALBUMIN  Discontinued         Review of Systems  Constitutional, HEENT, cardiovascular, pulmonary, GI, , musculoskeletal, neuro, skin, endocrine and psych systems are negative, except as otherwise noted.     Objective    Exam  BP (!) 168/96 (BP Location: Right arm, Patient Position: Sitting, Cuff Size: Adult Regular)   Pulse 79   Temp 98.2  F (36.8  " C)   Resp 18   Ht 1.613 m (5' 3.5\")   Wt 70.3 kg (155 lb)   LMP  (LMP Unknown)   SpO2 98%   BMI 27.03 kg/m     Estimated body mass index is 27.03 kg/m  as calculated from the following:    Height as of this encounter: 1.613 m (5' 3.5\").    Weight as of this encounter: 70.3 kg (155 lb).    Physical Exam  EYES: Eyelids, conjunctiva, and sclera were normal. Pupils were normal. Cornea, iris, and lens were normal bilaterally.  HEAD, EARS, NOSE, MOUTH, AND THROAT: Head and face were normal. TMs and external auditory canals are normal.  Oropharynx normal  NECK: Neck appearance was normal. There were no neck masses and the thyroid was not enlarged and no nodules are felt.  No lymphadenopathy.  RESPIRATORY: Breathing pattern was normal and the chest moved symmetrically.   Lung sounds were normal and there were no rales or wheezes.  CARDIOVASCULAR: Heart rate and rhythm were normal.  S1 and S2 were normal and there were no extra sounds or murmurs. Peripheral pulses in arms and legs were normal.  There was no peripheral edema.  No carotid bruits.  GASTROINTESTINAL:  Bowel sounds were present.   Palpation detected no tenderness, mass, or enlarged organs.   MUSCULOSKELETAL: Skeletal configuration was normal and muscle mass was normal for age. Joint appearance was overall normal.  LYMPHATIC: There were no enlarged nodes.  SKIN/HAIR/NAILS: Skin color was normal.  There were no concerning skin lesions.  NEUROLOGIC: The patient was alert and oriented to person, place, time, and circumstance. Speech was normal. Cranial nerves were normal. Motor strength was normal for age. The patient was normally coordinated.  Sensation intact.  PSYCHIATRIC:  Mood and affect were normal and the patient had normal recent and remote memory. The patient's judgment and insight were normal.         4/26/2024   Mini Cog   Clock Draw Score 2 Normal   3 Item Recall 2 objects recalled   Mini Cog Total Score 4              Signed Electronically by: " Dawson Cloud MD

## 2024-04-26 NOTE — PROGRESS NOTES
ANTICOAGULATION MANAGEMENT     Ginna Quiroga 89 year old female is on warfarin with therapeutic INR result. (Goal INR 2.0-3.0)    Recent labs: (last 7 days)     04/26/24  1641   INR 2.5*       ASSESSMENT     Source(s): Chart Review  Previous INR was Supratherapeutic  Medication, diet, health changes since last INR chart reviewed; none identified         PLAN     Recommended plan for no diet, medication or health factor changes affecting INR     Dosing Instructions: Continue your current warfarin dose with next INR in 3 weeks       Summary  As of 4/26/2024      Full warfarin instructions:  2 mg every Sun, Tue, Thu; 1 mg all other days   Next INR check:  5/17/2024               Detailed voice message left for Ginna with dosing instructions and follow up date.     Contact [phone] to schedule and with any changes, questions or concerns.     Education provided:   Please call back if any changes to your diet, medications or how you've been taking warfarin    Plan made per [facility] anticoagulation protocol    Dixie Cordoba RN  Anticoagulation Clinic  4/26/2024    _______________________________________________________________________     Anticoagulation Episode Summary       Current INR goal:  2.0-3.0   TTR:  81.2% (1 y)   Target end date:  Indefinite   Send INR reminders to:  PATRICIA BLACKBURN    Indications    Long term current use of anticoagulant therapy [Z79.01]  Paroxysmal atrial fibrillation (H) [I48.0]  History of pulmonary embolus (PE) [Z86.711]             Comments:               Anticoagulation Care Providers       Provider Role Specialty Phone number    Dawson Cloud MD Referring Internal Medicine [phone]

## 2024-04-26 NOTE — PATIENT INSTRUCTIONS
Preventive Care Advice   This is general advice given by our system to help you stay healthy. However, your care team may have specific advice just for you. Please talk to your care team about your preventive care needs.  Nutrition  Eat 5 or more servings of fruits and vegetables each day.  Try wheat bread, brown rice and whole grain pasta (instead of white bread, rice, and pasta).  Get enough calcium and vitamin D. Check the label on foods and aim for 100% of the RDA (recommended daily allowance).  Try to get at least 1200 mg of calcium in your diet and with supplements every day.  Set a goal of at least 4 servings of dairy.  Lifestyle  Exercise at least 150 minutes each week   (30 minutes a day, 5 days a week).  Do muscle strengthening activities 2 days a week. These help control your weight and prevent disease.  No smoking.  Wear sunscreen to prevent skin cancer.  Have a dental exam and cleaning every 6 months.  Continue to see your eye doctor every 6 months  Yearly exams  See your health care team every year to talk about:  Any changes in your health.  Any medicines your care team has prescribed.  Preventive care, family planning, and ways to prevent chronic diseases.  Shots (vaccines)   COVID-19 shot: Get this shot when it's due.  Flu shot: Get a flu shot every year.  Tetanus shot: Get a tetanus shot every 10 years.  You are due for a Td vaccine.  This can be scheduled at your pharmacy.  I would recommend getting an RSV vaccine completed at your pharmacy  Shingles shot (for age 50 and up).  Shingles vaccine, Shingrix is recommended.  This can be scheduled at your pharmacy  General health tests  Diabetes screening: Annually  Cholesterol test: Annually  Bone density scan (DEXA): Repeat in 2025 after you have completed 5 years of alendronate  Cancer screening tests  Breast cancer scan (mammogram): Annual mammogram  Colon cancer screening: It is important to start screening for colon cancer at age 45.  Colonoscopy  for colon cancer screening is not recommended after age 75 as risks of procedure are too high  For informational purposes only. Not to replace the advice of your health care provider. Copyright   2023 [facility]. All rights reserved. Clinically reviewed by the [facility]. [employer] [id number] - REV 01/24.    Recheck your blood pressure at least twice weekly over the next 2 weeks.  A good strategy is to check twice in the morning and again twice in the evening on 2 separate days each week.  Please send me the results with a [employer] message in 2 weeks.  If your systolic blood pressure is staying over 140, I would recommend adding amlodipine to your medication regimen    Preventing Falls: Care Instructions  Injuries and health problems such as trouble walking or poor eyesight can increase your risk of falling. So can some medicines. But there are things you can do to help prevent falls. You can exercise to get stronger. You can also arrange your home to make it safer.    Talk to your doctor about the medicines you take. Ask if any of them increase the risk of falls and whether they can be changed or stopped.   Try to exercise regularly. It can help improve your strength and balance. This can help lower your risk of falling.     Practice fall safety and prevention.    Wear low-heeled shoes that fit well and give your feet good support. Talk to your doctor if you have foot problems that make this hard.  Carry a cellphone or wear a medical alert device that you can use to call for help.  Use stepladders instead of chairs to reach high objects. Don't climb if you're at risk for falls. Ask for help, if needed.  Wear the correct eyeglasses, if you need them.    Make your home safer.    Remove rugs, cords, clutter, and furniture from walkways.  Keep your house well lit. Use night-lights in hallways and bathrooms.  Install and use sturdy handrails on stairways.  Wear nonskid footwear, even  "inside. Don't walk barefoot or in socks without shoes.    Be safe outside.    Use handrails, curb cuts, and ramps whenever possible.  Keep your hands free by using a shoulder bag or backpack.  Try to walk in well-lit areas. Watch out for uneven ground, changes in pavement, and debris.  Be careful in the winter. Walk on the grass or gravel when sidewalks are slippery. Use de-icer on steps and walkways. Add non-slip devices to shoes.    Put grab bars and nonskid mats in your shower or tub and near the toilet. Try to use a shower chair or bath bench when bathing.   Get into a tub or shower by putting in your weaker leg first. Get out with your strong side first. Have a phone or medical alert device in the bathroom with you.   Where can you learn more?  Go to https://www.[employer].[facility]/patiented  Enter G117 in the search box to learn more about \"Preventing Falls: Care Instructions.\"  Current as of: July 17, 2023               Content Version: 14.0    5009-2772 [employer].   Care instructions adapted under license by your healthcare professional. If you have questions about a medical condition or this instruction, always ask your healthcare professional. [employer] disclaims any warranty or liability for your use of this information.      Hearing Loss: Care Instructions  Overview     Hearing loss is a sudden or slow decrease in how well you hear. It can range from slight to profound. Permanent hearing loss can occur with aging. It also can happen when you are exposed long-term to loud noise. Examples include listening to loud music, riding motorcycles, or being around other loud machines.  Hearing loss can affect your work and home life. It can make you feel lonely or depressed. You may feel that you have lost your independence. But hearing aids and other devices can help you hear better and feel connected to others.  Follow-up care is a key part of your treatment and safety. Be sure to make " and go to all appointments, and call your doctor if you are having problems. It's also a good idea to know your test results and keep a list of the medicines you take.  How can you care for yourself at home?  Avoid loud noises whenever possible. This helps keep your hearing from getting worse.  Always wear hearing protection around loud noises.  Wear a hearing aid as directed.  A professional can help you pick a hearing aid that will work best for you.  You can also get hearing aids over the counter for mild to moderate hearing loss.  Have hearing tests as your doctor suggests. They can show whether your hearing has changed. Your hearing aid may need to be adjusted.  Use other devices as needed. These may include:  Telephone amplifiers and hearing aids that can connect to a television, stereo, radio, or microphone.  Devices that use lights or vibrations. These alert you to the doorbell, a ringing telephone, or a baby monitor.  Television closed-captioning. This shows the words at the bottom of the screen. Most new TVs can do this.  TTY (text telephone). This lets you type messages back and forth on the telephone instead of talking or listening. These devices are also called TDD. When messages are typed on the keyboard, they are sent over the phone line to a receiving TTY. The message is shown on a monitor.  Use text messaging, social media, and email if it is hard for you to communicate by telephone.  Try to learn a listening technique called speechreading. It is not lipreading. You pay attention to people's gestures, expressions, posture, and tone of voice. These clues can help you understand what a person is saying. Face the person you are talking to, and have them face you. Make sure the lighting is good. You need to see the other person's face clearly.  Think about counseling if you need help to adjust to your hearing loss.  When should you call for help?  Watch closely for changes in your health, and be sure to  "contact your doctor if:    You think your hearing is getting worse.     You have new symptoms, such as dizziness or nausea.   Where can you learn more?  Go to https://www.[employer]ell[employer].net/patiented  Enter R798 in the search box to learn more about \"Hearing Loss: Care Instructions.\"  Current as of: September 27, 2023               Content Version: 14.0    2620-5185 [employer].   Care instructions adapted under license by your healthcare professional. If you have questions about a medical condition or this instruction, always ask your healthcare professional. [employer] disclaims any warranty or liability for your use of this information.      Learning About Stress  What is stress?     Stress is your body's response to a hard situation. Your body can have a physical, emotional, or mental response. Stress is a fact of life for most people, and it affects everyone differently. What causes stress for you may not be stressful for someone else.  A lot of things can cause stress. You may feel stress when you go on a job interview, take a test, or run a race. This kind of short-term stress is normal and even useful. It can help you if you need to work hard or react quickly. For example, stress can help you finish an important job on time.  Long-term stress is caused by ongoing stressful situations or events. Examples of long-term stress include long-term health problems, ongoing problems at work, or conflicts in your family. Long-term stress can harm your health.  How does stress affect your health?  When you are stressed, your body responds as though you are in danger. It makes hormones that speed up your heart, make you breathe faster, and give you a burst of energy. This is called the fight-or-flight stress response. If the stress is over quickly, your body goes back to normal and no harm is done.  But if stress happens too often or lasts too long, it can have bad effects. Long-term stress can " make you more likely to get sick, and it can make symptoms of some diseases worse. If you tense up when you are stressed, you may develop neck, shoulder, or low back pain. Stress is linked to high blood pressure and heart disease.  Stress also harms your emotional health. It can make you armendariz, tense, or depressed. Your relationships may suffer, and you may not do well at work or school.  What can you do to manage stress?  You can try these things to help manage stress:   Do something active. Exercise or activity can help reduce stress. Walking is a great way to get started. Even everyday activities such as housecleaning or yard work can help.  Try yoga or ольга chi. These techniques combine exercise and meditation. You may need some training at first to learn them.  Do something you enjoy. For example, listen to music or go to a movie. Practice your hobby or do volunteer work.  Meditate. This can help you relax, because you are not worrying about what happened before or what may happen in the future.  Do guided imagery. Imagine yourself in any setting that helps you feel calm. You can use online videos, books, or a teacher to guide you.  Do breathing exercises. For example:  From a standing position, bend forward from the waist with your knees slightly bent. Let your arms dangle close to the floor.  Breathe in slowly and deeply as you return to a standing position. Roll up slowly and lift your head last.  Hold your breath for just a few seconds in the standing position.  Breathe out slowly and bend forward from the waist.  Let your feelings out. Talk, laugh, cry, and express anger when you need to. Talking with supportive friends or family, a counselor, or a ceci leader about your feelings is a healthy way to relieve stress. Avoid discussing your feelings with people who make you feel worse.  Write. It may help to write about things that are bothering you. This helps you find out how much stress you feel and what is  "causing it. When you know this, you can find better ways to cope.  What can you do to prevent stress?  You might try some of these things to help prevent stress:  Manage your time. This helps you find time to do the things you want and need to do.  Get enough sleep. Your body recovers from the stresses of the day while you are sleeping.  Get support. Your family, friends, and community can make a difference in how you experience stress.  Limit your news feed. Avoid or limit time on social media or news that may make you feel stressed.  Do something active. Exercise or activity can help reduce stress. Walking is a great way to get started.  Where can you learn more?  Go to https://www.[employer].net/patiented  Enter N032 in the search box to learn more about \"Learning About Stress.\"  Current as of: October 24, 2023               Content Version: 14.0    5038-2624 [employer].   Care instructions adapted under license by your healthcare professional. If you have questions about a medical condition or this instruction, always ask your healthcare professional. [employer] disclaims any warranty or liability for your use of this information.      Bladder Training: Care Instructions  Your Care Instructions     Bladder training is used to treat urge incontinence and stress incontinence. Urge incontinence means that the need to urinate comes on so fast that you can't get to a toilet in time. Stress incontinence means that you leak urine because of pressure on your bladder. For example, it may happen when you laugh, cough, or lift something heavy.  Bladder training can increase how long you can wait before you have to urinate. It can also help your bladder hold more urine. And it can give you better control over the urge to urinate.  It is important to remember that bladder training takes a few weeks to a few months to make a difference. You may not see results right away, but don't give " up.  Follow-up care is a key part of your treatment and safety. Be sure to make and go to all appointments, and call your doctor if you are having problems. It's also a good idea to know your test results and keep a list of the medicines you take.  How can you care for yourself at home?  Work with your doctor to come up with a bladder training program that is right for you. You may use one or more of the following methods.  Delayed urination  In the beginning, try to keep from urinating for 5 minutes after you first feel the need to go.  While you wait, take deep, slow breaths to relax. Kegel exercises can also help you delay the need to go to the bathroom.  After some practice, when you can easily wait 5 minutes to urinate, try to wait 10 minutes before you urinate.  Slowly increase the waiting period until you are able to control when you have to urinate.  Scheduled urination  Empty your bladder when you first wake up in the morning.  Schedule times throughout the day when you will urinate.  Start by going to the bathroom every hour, even if you don't need to go.  Slowly increase the time between trips to the bathroom.  When you have found a schedule that works well for you, keep doing it.  If you wake up during the night and have to urinate, do it. Apply your schedule to waking hours only.  Kegel exercises  These tighten and strengthen pelvic muscles, which can help you control the flow of urine. (If doing these exercises causes pain, stop doing them and talk with your doctor.) To do Kegel exercises:  Squeeze your muscles as if you were trying not to pass gas. Or squeeze your muscles as if you were stopping the flow of urine. Your belly, legs, and buttocks shouldn't move.  Hold the squeeze for 3 seconds, then relax for 5 to 10 seconds.  Start with 3 seconds, then add 1 second each week until you are able to squeeze for 10 seconds.  Repeat the exercise 10 times a session. Do 3 to 8 sessions a day.  When should you  "call for help?  Watch closely for changes in your health, and be sure to contact your doctor if:    Your incontinence is getting worse.     You do not get better as expected.   Where can you learn more?  Go to https://www.[employer].net/patiented  Enter V684 in the search box to learn more about \"Bladder Training: Care Instructions.\"  Current as of: November 15, 2023               Content Version: 14.0    6241-4759 [employer].   Care instructions adapted under license by your healthcare professional. If you have questions about a medical condition or this instruction, always ask your healthcare professional. [employer] disclaims any warranty or liability for your use of this information.      Learning About Being Active as an Older Adult  Why is being active important as you get older?     Being active is one of the best things you can do for your health. And it's never too late to start. Being active--or getting active, if you aren't already--has definite benefits. It can:  Give you more energy,  Keep your mind sharp.  Improve balance to reduce your risk of falls.  Help you manage chronic illness with fewer medicines.  No matter how old you are, how fit you are, or what health problems you have, there is a form of activity that will work for you. And the more physical activity you can do, the better your overall health will be.  What kinds of activity can help you stay healthy?  Being more active will make your daily activities easier. Physical activity includes planned exercise and things you do in daily life. There are four types of activity:  Aerobic.  Doing aerobic activity makes your heart and lungs strong.  Includes walking, dancing, and gardening.  Aim for at least 2  hours spread throughout the week.  It improves your energy and can help you sleep better.  Muscle-strengthening.  This type of activity can help maintain muscle and strengthen bones.  Includes climbing stairs, using " resistance bands, and lifting or carrying heavy loads.  Aim for at least twice a week.  It can help protect the knees and other joints.  Stretching.  Stretching gives you better range of motion in joints and muscles.  Includes upper arm stretches, calf stretches, and gentle yoga.  Aim for at least twice a week, preferably after your muscles are warmed up from other activities.  It can help you function better in daily life.  Balancing.  This helps you stay coordinated and have good posture.  Includes heel-to-toe walking, ольга chi, and certain types of yoga.  Aim for at least 3 days a week.  It can reduce your risk of falling.  Even if you have a hard time meeting the recommendations, it's better to be more active than less active. All activity done in each category counts toward your weekly total. You'd be surprised how daily things like carrying groceries, keeping up with grandchildren, and taking the stairs can add up.  What keeps you from being active?  If you've had a hard time being more active, you're not alone. Maybe you remember being able to do more. Or maybe you've never thought of yourself as being active. It's frustrating when you can't do the things you want. Being more active can help. What's holding you back?  Getting started.  Have a goal, but break it into easy tasks. Small steps build into big accomplishments.  Staying motivated.  If you feel like skipping your activity, remember your goal. Maybe you want to move better and stay independent. Every activity gets you one step closer.  Not feeling your best.  Start with 5 minutes of an activity you enjoy. Prove to yourself you can do it. As you get comfortable, increase your time.  You may not be where you want to be. But you're in the process of getting there. Everyone starts somewhere.  How can you find safe ways to stay active?  Talk with your doctor about any physical challenges you're facing. Make a plan with your doctor if you have a health  "problem or aren't sure how to get started with activity.  If you're already active, ask your doctor if there is anything you should change to stay safe as your body and health change.  If you tend to feel dizzy after you take medicine, avoid activity at that time. Try being active before you take your medicine. This will reduce your risk of falls.  If you plan to be active at home, make sure to clear your space before you get started. Remove things like TV cords, coffee tables, and throw rugs. It's safest to have plenty of space to move freely.  The key to getting more active is to take it slow and steady. Try to improve only a little bit at a time. Pick just one area to improve on at first. And if an activity hurts, stop and talk to your doctor.  Where can you learn more?  Go to https://www.[employer].net/patiented  Enter P600 in the search box to learn more about \"Learning About Being Active as an Older Adult.\"  Current as of: June 5, 2023               Content Version: 14.0    0920-2812 [employer].   Care instructions adapted under license by your healthcare professional. If you have questions about a medical condition or this instruction, always ask your healthcare professional. [employer] disclaims any warranty or liability for your use of this information.      "

## 2024-04-26 NOTE — LETTER
April 29, 2024      Ginna Quiroga  [address]  [place] [address]        Dear Ginna,    Kidney function is stable.  Normal liver studies.  Normal CBC including no anemia.  Cholesterol is well-controlled.  Normal thyroid function.  No diabetes.  Vitamin D level 51 is within the desired range.    Resulted Orders   CBC with platelets   Result Value Ref Range    WBC Count 7.2 4.0 - 11.0 10e3/uL    RBC Count 4.50 3.80 - 5.20 10e6/uL    Hemoglobin 12.9 11.7 - 15.7 g/dL    Hematocrit 39.7 35.0 - 47.0 %    MCV 88 78 - 100 fL    MCH 28.7 26.5 - 33.0 pg    MCHC 32.5 31.5 - 36.5 g/dL    RDW 14.5 10.0 - 15.0 %    Platelet Count 199 150 - 450 10e3/uL   Comprehensive metabolic panel (BMP + Alb, Alk Phos, ALT, AST, Total. Bili, TP)   Result Value Ref Range    Sodium 142 135 - 145 mmol/L      Comment:      Reference intervals for this test were updated on 09/26/2023 to more accurately reflect our healthy population. There may be differences in the flagging of prior results with similar values performed with this method. Interpretation of those prior results can be made in the context of the updated reference intervals.     Potassium 3.5 3.4 - 5.3 mmol/L    Carbon Dioxide (CO2) 28 22 - 29 mmol/L    Anion Gap 12 7 - 15 mmol/L    Urea Nitrogen 28.8 (H) 8.0 - 23.0 mg/dL    Creatinine 0.97 (H) 0.51 - 0.95 mg/dL    GFR Estimate 56 (L) >60 mL/min/1.73m2    Calcium 9.2 8.8 - 10.2 mg/dL    Chloride 102 98 - 107 mmol/L    Glucose 95 70 - 99 mg/dL    Alkaline Phosphatase 115 40 - 150 U/L      Comment:      Reference intervals for this test were updated on 11/14/2023 to more accurately reflect our healthy population. There may be differences in the flagging of prior results with similar values performed with this method. Interpretation of those prior results can be made in the context of the updated reference intervals.    AST 34 0 - 45 U/L      Comment:      Reference intervals for this test were updated on 6/12/2023 to more accurately  reflect our healthy population. There may be differences in the flagging of prior results with similar values performed with this method. Interpretation of those prior results can be made in the context of the updated reference intervals.    ALT 31 0 - 50 U/L      Comment:      Reference intervals for this test were updated on 6/12/2023 to more accurately reflect our healthy population. There may be differences in the flagging of prior results with similar values performed with this method. Interpretation of those prior results can be made in the context of the updated reference intervals.      Protein Total 7.4 6.4 - 8.3 g/dL    Albumin 4.2 3.5 - 5.2 g/dL    Bilirubin Total 0.6 <=1.2 mg/dL   Lipid Profile (Chol, Trig, HDL, LDL calc)   Result Value Ref Range    Cholesterol 177 <200 mg/dL    Triglycerides 102 <150 mg/dL    Direct Measure HDL 51 >=50 mg/dL    LDL Cholesterol Calculated 106 (H) <=100 mg/dL    Non HDL Cholesterol 126 <130 mg/dL    Patient Fasting > 8hrs? Unknown     Narrative    Cholesterol  Desirable:  <200 mg/dL    Triglycerides  Normal:  Less than 150 mg/dL  Borderline High:  150-199 mg/dL  High:  200-499 mg/dL  Very High:  Greater than or equal to 500 mg/dL    Direct Measure HDL  Female:  Greater than or equal to 50 mg/dL   Male:  Greater than or equal to 40 mg/dL    LDL Cholesterol  Desirable:  <100mg/dL  Above Desirable:  100-129 mg/dL   Borderline High:  130-159 mg/dL   High:  160-189 mg/dL   Very High:  >= 190 mg/dL    Non HDL Cholesterol  Desirable:  130 mg/dL  Above Desirable:  130-159 mg/dL  Borderline High:  160-189 mg/dL  High:  190-219 mg/dL  Very High:  Greater than or equal to 220 mg/dL   UA Macroscopic with reflex to Microscopic and Culture - Lab Collect   Result Value Ref Range    Color Urine Yellow Colorless, Straw, Light Yellow, Yellow    Appearance Urine Clear Clear    Glucose Urine Negative Negative mg/dL    Bilirubin Urine Negative Negative    Ketones Urine Negative Negative mg/dL     Specific Gravity Urine 1.020 1.005 - 1.030    Blood Urine Negative Negative    pH Urine 7.0 5.0 - 8.0    Protein Albumin Urine Negative Negative mg/dL    Urobilinogen Urine 0.2 0.2, 1.0 E.U./dL    Nitrite Urine Negative Negative    Leukocyte Esterase Urine Negative Negative    Narrative    Microscopic not indicated   Vitamin D deficiency screening   Result Value Ref Range    Vitamin D, Total (25-Hydroxy) 51 (H) 20 - 50 ng/mL      Comment:      indicates supplementation, with increased risk of hypercalciuria    Narrative    Season, race, dietary intake, and treatment affect the concentration of 25-hydroxy-Vitamin D. Values may decrease during winter months and increase during summer months.    Vitamin D determination is routinely performed by an immunoassay specific for 25 hydroxyvitamin D3.  If an individual is on vitamin D2(ergocalciferol) supplementation, please specify 25 OH vitamin D2 and D3 level determination by LCMSMS test VITD23.     TSH with free T4 reflex   Result Value Ref Range    TSH 1.83 0.30 - 4.20 uIU/mL   Magnesium   Result Value Ref Range    Magnesium 2.2 1.7 - 2.3 mg/dL       If you have any questions or concerns, please call the clinic at the number listed above.       Sincerely,      Dawson Cloud MD

## 2024-04-27 LAB
ALBUMIN SERPL BCG-MCNC: 4.2 G/DL (ref 3.5–5.2)
ALP SERPL-CCNC: 115 U/L (ref 40–150)
ALT SERPL W P-5'-P-CCNC: 31 U/L (ref 0–50)
ANION GAP SERPL CALCULATED.3IONS-SCNC: 12 MMOL/L (ref 7–15)
AST SERPL W P-5'-P-CCNC: 34 U/L (ref 0–45)
BILIRUB SERPL-MCNC: 0.6 MG/DL
BUN SERPL-MCNC: 28.8 MG/DL (ref 8–23)
CALCIUM SERPL-MCNC: 9.2 MG/DL (ref 8.8–10.2)
CHLORIDE SERPL-SCNC: 102 MMOL/L (ref 98–107)
CHOLEST SERPL-MCNC: 177 MG/DL
CREAT SERPL-MCNC: 0.97 MG/DL (ref 0.51–0.95)
DEPRECATED HCO3 PLAS-SCNC: 28 MMOL/L (ref 22–29)
EGFRCR SERPLBLD CKD-EPI 2021: 56 ML/MIN/1.73M2
FASTING STATUS PATIENT QL REPORTED: ABNORMAL
GLUCOSE SERPL-MCNC: 95 MG/DL (ref 70–99)
HDLC SERPL-MCNC: 51 MG/DL
LDLC SERPL CALC-MCNC: 106 MG/DL
MAGNESIUM SERPL-MCNC: 2.2 MG/DL (ref 1.7–2.3)
NONHDLC SERPL-MCNC: 126 MG/DL
POTASSIUM SERPL-SCNC: 3.5 MMOL/L (ref 3.4–5.3)
PROT SERPL-MCNC: 7.4 G/DL (ref 6.4–8.3)
SODIUM SERPL-SCNC: 142 MMOL/L (ref 135–145)
TRIGL SERPL-MCNC: 102 MG/DL
TSH SERPL DL<=0.005 MIU/L-ACNC: 1.83 UIU/ML (ref 0.3–4.2)
VIT D+METAB SERPL-MCNC: 51 NG/ML (ref 20–50)

## 2024-05-09 ENCOUNTER — TRANSFERRED RECORDS (OUTPATIENT)
Dept: HEALTH INFORMATION MANAGEMENT | Facility: CLINIC | Age: 89
End: 2024-05-09
Payer: COMMERCIAL

## 2024-05-23 ENCOUNTER — LAB (OUTPATIENT)
Dept: LAB | Facility: CLINIC | Age: 89
End: 2024-05-23
Payer: COMMERCIAL

## 2024-05-23 ENCOUNTER — ANTICOAGULATION THERAPY VISIT (OUTPATIENT)
Dept: ANTICOAGULATION | Facility: CLINIC | Age: 89
End: 2024-05-23

## 2024-05-23 DIAGNOSIS — Z86.711 HISTORY OF PULMONARY EMBOLUS (PE): ICD-10-CM

## 2024-05-23 DIAGNOSIS — Z79.01 LONG TERM CURRENT USE OF ANTICOAGULANT THERAPY: ICD-10-CM

## 2024-05-23 DIAGNOSIS — Z79.01 LONG TERM CURRENT USE OF ANTICOAGULANT THERAPY: Primary | ICD-10-CM

## 2024-05-23 DIAGNOSIS — I48.0 PAROXYSMAL ATRIAL FIBRILLATION (H): ICD-10-CM

## 2024-05-23 LAB — INR BLD: 3.2 (ref 0.9–1.1)

## 2024-05-23 PROCEDURE — 85610 PROTHROMBIN TIME: CPT

## 2024-05-23 PROCEDURE — 36416 COLLJ CAPILLARY BLOOD SPEC: CPT

## 2024-05-23 NOTE — PROGRESS NOTES
ANTICOAGULATION MANAGEMENT     Ginna Quiroga 89 year old female is on warfarin with supratherapeutic INR result. (Goal INR 2.0-3.0)    Recent labs: (last 7 days)     05/23/24  1357   INR 3.2*       ASSESSMENT     Source(s): Chart Review and Patient/Caregiver Call     Warfarin doses taken: Warfarin taken as instructed  Diet: Decreased greens/vitamin K in diet; plans to resume previous intake  Medication/supplement changes: None noted  New illness, injury, or hospitalization: No  Signs or symptoms of bleeding or clotting: No  Previous result: Therapeutic last visit; previously outside of goal range  Additional findings:  discussed the need to be consistent with eating the greens. She did not want to change her dose. Wants to work on her diet. We discussed her last 4 INR results 3 have been high.       PLAN     Recommended plan for temporary change(s) affecting INR     Dosing Instructions: Continue your current warfarin dose with next INR in 2-3 weeks       Summary  As of 5/23/2024      Full warfarin instructions:  2 mg every Sun, Tue, Thu; 1 mg all other days   Next INR check:  6/13/2024               Telephone call with Ginna who agrees to plan and repeated back plan correctly    Patient offered & declined to schedule next visit    Education provided:   Please call back if any changes to your diet, medications or how you've been taking warfarin  Goal range and lab monitoring: goal range and significance of current result and Importance of therapeutic range  Dietary considerations: importance of consistent vitamin K intake    Plan made per [facility] anticoagulation protocol    Gretta Maxwell RN  Anticoagulation Clinic  5/23/2024    _______________________________________________________________________     Anticoagulation Episode Summary       Current INR goal:  2.0-3.0   TTR:  79.1% (1 y)   Target end date:  Indefinite   Send INR reminders to:  PATRICIA [facility]BURN    Indications    Long term current use of  anticoagulant therapy [Z79.01]  Paroxysmal atrial fibrillation (H) [I48.0]  History of pulmonary embolus (PE) [Z86.711]             Comments:               Anticoagulation Care Providers       Provider Role Specialty Phone number    Dawson Cloud MD Referring Internal Medicine [phone]

## 2024-06-19 ENCOUNTER — ANTICOAGULATION THERAPY VISIT (OUTPATIENT)
Dept: ANTICOAGULATION | Facility: CLINIC | Age: 89
End: 2024-06-19

## 2024-06-19 ENCOUNTER — LAB (OUTPATIENT)
Dept: LAB | Facility: CLINIC | Age: 89
End: 2024-06-19
Payer: COMMERCIAL

## 2024-06-19 DIAGNOSIS — I48.0 PAROXYSMAL ATRIAL FIBRILLATION (H): ICD-10-CM

## 2024-06-19 DIAGNOSIS — Z86.711 HISTORY OF PULMONARY EMBOLUS (PE): ICD-10-CM

## 2024-06-19 DIAGNOSIS — Z79.01 LONG TERM CURRENT USE OF ANTICOAGULANT THERAPY: ICD-10-CM

## 2024-06-19 DIAGNOSIS — Z79.01 LONG TERM CURRENT USE OF ANTICOAGULANT THERAPY: Primary | ICD-10-CM

## 2024-06-19 LAB — INR BLD: 2.1 (ref 0.9–1.1)

## 2024-06-19 PROCEDURE — 36416 COLLJ CAPILLARY BLOOD SPEC: CPT

## 2024-06-19 PROCEDURE — 85610 PROTHROMBIN TIME: CPT

## 2024-06-19 NOTE — PROGRESS NOTES
ANTICOAGULATION MANAGEMENT     Ginna Quiroga 89 year old female is on warfarin with therapeutic INR result. (Goal INR 2.0-3.0)    Recent labs: (last 7 days)     06/19/24  1402   INR 2.1*       ASSESSMENT     Source(s): Chart Review and Patient/Caregiver Call     Warfarin doses taken: Warfarin taken as instructed  Diet: No new diet changes identified  Medication/supplement changes: None noted  New illness, injury, or hospitalization: No  Signs or symptoms of bleeding or clotting: No  Previous result: Supratherapeutic  Additional findings: None       PLAN     Recommended plan for no diet, medication or health factor changes affecting INR     Dosing Instructions: Continue your current warfarin dose with next INR in 5 weeks   It has been 4 weeks since last check.    Summary  As of 6/19/2024      Full warfarin instructions:  2 mg every Sun, Tue, Thu; 1 mg all other days   Next INR check:  7/24/2024               Telephone call with Ginna who agrees to plan and repeated back plan correctly    Patient offered & declined to schedule next visit    Education provided:   Please call back if any changes to your diet, medications or how you've been taking warfarin  Goal range and lab monitoring: goal range and significance of current result and Importance of therapeutic range    Plan made per [facility] anticoagulation protocol    Gretta Maxwell RN  Anticoagulation Clinic  6/19/2024    _______________________________________________________________________     Anticoagulation Episode Summary       Current INR goal:  2.0-3.0   TTR:  77.8% (1 y)   Target end date:  Indefinite   Send INR reminders to:  PATRICIA [facility]BURN    Indications    Long term current use of anticoagulant therapy [Z79.01]  Paroxysmal atrial fibrillation (H) [I48.0]  History of pulmonary embolus (PE) [Z86.711]             Comments:               Anticoagulation Care Providers       Provider Role Specialty Phone number    Dawson Cloud MD Referring  Internal Medicine [phone]

## 2024-07-23 ENCOUNTER — LAB (OUTPATIENT)
Dept: LAB | Facility: CLINIC | Age: 89
End: 2024-07-23
Payer: COMMERCIAL

## 2024-07-23 ENCOUNTER — DOCUMENTATION ONLY (OUTPATIENT)
Dept: ANTICOAGULATION | Facility: CLINIC | Age: 89
End: 2024-07-23

## 2024-07-23 ENCOUNTER — ANTICOAGULATION THERAPY VISIT (OUTPATIENT)
Dept: ANTICOAGULATION | Facility: CLINIC | Age: 89
End: 2024-07-23

## 2024-07-23 DIAGNOSIS — I48.0 PAROXYSMAL ATRIAL FIBRILLATION (H): ICD-10-CM

## 2024-07-23 DIAGNOSIS — Z79.01 LONG TERM CURRENT USE OF ANTICOAGULANT THERAPY: ICD-10-CM

## 2024-07-23 DIAGNOSIS — Z86.711 HISTORY OF PULMONARY EMBOLUS (PE): ICD-10-CM

## 2024-07-23 DIAGNOSIS — Z79.01 LONG TERM CURRENT USE OF ANTICOAGULANT THERAPY: Primary | ICD-10-CM

## 2024-07-23 LAB — INR BLD: 2.1 (ref 0.9–1.1)

## 2024-07-23 PROCEDURE — 85610 PROTHROMBIN TIME: CPT

## 2024-07-23 PROCEDURE — 36416 COLLJ CAPILLARY BLOOD SPEC: CPT

## 2024-07-23 NOTE — PROGRESS NOTES
ANTICOAGULATION CLINIC REFERRAL RENEWAL REQUEST       An annual renewal order is required for all patients referred to [facility].?  Please review and sign the pended referral order for Ginna Quiroga.       ANTICOAGULATION SUMMARY      Warfarin indication(s)   Atrial Fibrillation and PE    Mechanical heart valve present?  NO       Current goal range   INR: 2.0-3.0     Goal appropriate for indication? Goal INR 2-3, standard for indication(s) above     Time in Therapeutic Range (TTR)  (Goal > 60%) 77.8%       Office visit with referring provider's group within last year yes on 4/26/24       Gretta Maxwell RN  [facility]

## 2024-07-23 NOTE — PROGRESS NOTES
ANTICOAGULATION MANAGEMENT     Ginna E Junaid 89 year old female is on warfarin with therapeutic INR result. (Goal INR 2.0-3.0)    Recent labs: (last 7 days)     07/23/24  1403   INR 2.1*       ASSESSMENT     Source(s): Chart Review and Patient/Caregiver Call     Warfarin doses taken: Warfarin taken as instructed  Diet: No new diet changes identified  Medication/supplement changes: None noted  New illness, injury, or hospitalization: No  Signs or symptoms of bleeding or clotting: No  Previous result: Therapeutic last 2(+) visits  Additional findings: None       PLAN     Recommended plan for no diet, medication or health factor changes affecting INR     Dosing Instructions: Continue your current warfarin dose with next INR in 6 weeks       Summary  As of 7/23/2024      Full warfarin instructions:  2 mg every Sun, Tue, Thu; 1 mg all other days   Next INR check:  9/3/2024               Telephone call with Ginna who verbalizes understanding and agrees to plan    Patient offered & declined to schedule next visit    Education provided: Please call back if any changes to your diet, medications or how you've been taking warfarin  Goal range and lab monitoring: goal range and significance of current result and Importance of therapeutic range    Plan made per [facility] anticoagulation protocol    Gretta Maxwell RN  Anticoagulation Clinic  7/23/2024    _______________________________________________________________________     Anticoagulation Episode Summary       Current INR goal:  2.0-3.0   TTR:  77.8% (1 y)   Target end date:  Indefinite   Send INR reminders to:  PATRICIA [facility]ACKBURN    Indications    Long term current use of anticoagulant therapy [Z79.01]  Paroxysmal atrial fibrillation (H) [I48.0]  History of pulmonary embolus (PE) [Z86.711]             Comments:               Anticoagulation Care Providers       Provider Role Specialty Phone number    Dawson Cloud MD Referring Internal Medicine [phone]

## 2024-07-25 ENCOUNTER — OFFICE VISIT (OUTPATIENT)
Dept: INTERNAL MEDICINE | Facility: CLINIC | Age: 89
End: 2024-07-25
Payer: COMMERCIAL

## 2024-07-25 VITALS
OXYGEN SATURATION: 99 % | SYSTOLIC BLOOD PRESSURE: 180 MMHG | BODY MASS INDEX: 25.95 KG/M2 | HEART RATE: 84 BPM | TEMPERATURE: 97.7 F | WEIGHT: 152 LBS | DIASTOLIC BLOOD PRESSURE: 90 MMHG | HEIGHT: 64 IN | RESPIRATION RATE: 16 BRPM

## 2024-07-25 DIAGNOSIS — I10 ESSENTIAL HYPERTENSION: Primary | ICD-10-CM

## 2024-07-25 PROCEDURE — G2211 COMPLEX E/M VISIT ADD ON: HCPCS | Performed by: INTERNAL MEDICINE

## 2024-07-25 PROCEDURE — 99214 OFFICE O/P EST MOD 30 MIN: CPT | Performed by: INTERNAL MEDICINE

## 2024-07-25 RX ORDER — AMLODIPINE BESYLATE 5 MG/1
5 TABLET ORAL DAILY
Qty: 90 TABLET | Refills: 3 | Status: SHIPPED | OUTPATIENT
Start: 2024-07-25

## 2024-07-25 NOTE — PATIENT INSTRUCTIONS
Continue to restrict salt in your diet setting a goal of getting less than 2000 mg of sodium daily    Check your blood pressure at least once weekly and record the results    Bring these results along with your home blood pressure monitor to the RN visit in 6 weeks

## 2024-07-25 NOTE — PROGRESS NOTES
"  Assessment & Plan     Essential hypertension  89-year-old woman here to follow-up hypertension that was not well-controlled when I saw her for her annual physical earlier in the year.  She continues on valsartan 320 mg daily and 150 mg of Toprol-XL.  She also takes 40 mg of Lasix daily.  She has been monitoring her blood pressure at home but did not bring in any results today.  She recalls her systolic blood pressure running around 140.  Unfortunately, her BP is still uncontrolled here at the office 180/90.  I am recommending starting Norvasc 5 mg daily and continuing her other medications.  Discussed potential side effects including increasing ankle edema.  She should continue to restrict salt in her diet setting a goal of getting less than 2000 mg of sodium daily.  She does not use a saltshaker but does enjoy her potato chips which she needs to avoid.  Some of her elevated blood pressure is related to \"whitecoat hypertension\".  I will have her follow-up with our RN in 6 weeks for a blood pressure recheck.  I am asking her to keep a log of her blood pressure readings at home and to bring that information along with her home blood pressure machine with her to that visit.  - amLODIPine (NORVASC) 5 MG tablet; Take 1 tablet (5 mg) by mouth daily    The longitudinal plan of care for the diagnosis(es)/condition(s) as documented were addressed during this visit. Due to the added complexity in care, I will continue to support Ginna in the subsequent management and with ongoing continuity of care.       BMI  Estimated body mass index is 26.5 kg/m  as calculated from the following:    Height as of this encounter: 1.613 m (5' 3.5\").    Weight as of this encounter: 68.9 kg (152 lb).         RN visit in 6 weeks    Delon Chacko is a 89 year old, presenting for the following health issues: Here to follow-up uncontrolled hypertension.  See assessment and plan for details  Follow Up (3 month )        7/25/2024     3:57 " "PM   Additional Questions   Roomed by      History of Present Illness       Hypertension: She presents for follow up of hypertension.  She does not check blood pressure  regularly outside of the clinic. Outpatient blood pressures have not been over 140/90. She follows a low salt diet.     She eats 2-3 servings of fruits and vegetables daily.She consumes 0 sweetened beverage(s) daily.She exercises with enough effort to increase her heart rate 9 or less minutes per day.  She exercises with enough effort to increase her heart rate 3 or less days per week.   She is taking medications regularly.             Review of Systems  Constitutional, HEENT, cardiovascular, pulmonary, gi and gu systems are negative, except as otherwise noted.      Objective    BP (!) 180/90   Pulse 84   Temp 97.7  F (36.5  C) (Oral)   Resp 16   Ht 1.613 m (5' 3.5\")   Wt 68.9 kg (152 lb)   LMP  (LMP Unknown)   SpO2 99%   Breastfeeding No   BMI 26.50 kg/m    Body mass index is 26.5 kg/m .  Physical Exam   Well-appearing elderly woman        Signed Electronically by: Dawson Cloud MD    "

## 2024-09-04 ENCOUNTER — ANTICOAGULATION THERAPY VISIT (OUTPATIENT)
Dept: ANTICOAGULATION | Facility: CLINIC | Age: 89
End: 2024-09-04

## 2024-09-04 ENCOUNTER — LAB (OUTPATIENT)
Dept: LAB | Facility: CLINIC | Age: 89
End: 2024-09-04
Payer: COMMERCIAL

## 2024-09-04 DIAGNOSIS — I48.0 PAROXYSMAL ATRIAL FIBRILLATION (H): ICD-10-CM

## 2024-09-04 DIAGNOSIS — Z86.711 HISTORY OF PULMONARY EMBOLUS (PE): ICD-10-CM

## 2024-09-04 DIAGNOSIS — Z79.01 LONG TERM CURRENT USE OF ANTICOAGULANT THERAPY: Primary | ICD-10-CM

## 2024-09-04 DIAGNOSIS — Z79.01 LONG TERM CURRENT USE OF ANTICOAGULANT THERAPY: ICD-10-CM

## 2024-09-04 LAB — INR BLD: 2.2 (ref 0.9–1.1)

## 2024-09-04 PROCEDURE — 85610 PROTHROMBIN TIME: CPT

## 2024-09-04 PROCEDURE — 36416 COLLJ CAPILLARY BLOOD SPEC: CPT

## 2024-09-04 NOTE — PROGRESS NOTES
ANTICOAGULATION MANAGEMENT     Ginna Quiroga 89 year old female is on warfarin with therapeutic INR result. (Goal INR 2.0-3.0)    Recent labs: (last 7 days)     09/04/24  1500   INR 2.2*       ASSESSMENT     Warfarin Lab Questionnaire    Warfarin Doses Last 7 Days      9/4/2024     2:49 AM   Dose in Tablet or Mg   TAB or MG? tablet (tab)     Pt Rptd Dose SUNDAY MONDAY TUESDAY WED THURS FRIDAY SATURDAY 9/4/2024   2:49 AM 1 0.5 1 0.5 1 0.5 1         9/4/2024   Warfarin Lab Questionnaire   Missed doses within past 14 days? No-she took correctly. Says she did not fill this sheet out.    Changes in diet or alcohol within past 14 days? No   Medication changes since last result? No-she started Norvasc 6 weeks ago.   Injuries or illness since last result? No   New shortness of breath, severe headaches or sudden changes in vision since last result? No   Abnormal bleeding since last result? No   Upcoming surgery, procedure? No        Previous result: Therapeutic last 2(+) visits  Additional findings: None       PLAN     Recommended plan for ongoing change(s) affecting INR     Dosing Instructions: Continue your current warfarin dose with next INR in 6 weeks       Summary  As of 9/4/2024      Full warfarin instructions:  2 mg every Sun, Tue, Thu; 1 mg all other days   Next INR check:  10/16/2024               Telephone call with Ginna who agrees to plan and repeated back plan correctly    Patient offered & declined to schedule next visit    Education provided: Please call back if any changes to your diet, medications or how you've been taking warfarin  Goal range and lab monitoring: goal range and significance of current result and Importance of therapeutic range  Contact [phone] with any changes, questions or concerns.     Plan made per [facility] anticoagulation protocol    Gretta Maxwell, RN  Anticoagulation Clinic  9/4/2024    _______________________________________________________________________      Anticoagulation Episode Summary       Current INR goal:  2.0-3.0   TTR:  77.8% (1 y)   Target end date:  Indefinite   Send INR reminders to:  PATRICIA [facility]BURN    Indications    Long term current use of anticoagulant therapy [Z79.01]  Paroxysmal atrial fibrillation (H) [I48.0]  History of pulmonary embolus (PE) [Z86.711]             Comments:               Anticoagulation Care Providers       Provider Role Specialty Phone number    Dawson Cloud MD Referring Internal Medicine [phone]

## 2024-09-05 ENCOUNTER — ALLIED HEALTH/NURSE VISIT (OUTPATIENT)
Dept: FAMILY MEDICINE | Facility: CLINIC | Age: 89
End: 2024-09-05
Payer: COMMERCIAL

## 2024-09-05 DIAGNOSIS — I10 ESSENTIAL HYPERTENSION: Primary | ICD-10-CM

## 2024-09-05 PROCEDURE — 99207 PR NO CHARGE NURSE ONLY: CPT

## 2024-09-05 NOTE — PROGRESS NOTES
Ginna Quiroga is a 89 year old year old patient who comes in today for a Blood Pressure check because of ongoing blood pressure monitoring.  Patient was sitting for 5 minutes before readings with legs uncrossed. Vital Signs as repeated by RN:   Home machine: 148/87 HR 64  Clinic machine: 133/74 HR 63  Patient is taking medication as prescribed  Patient is tolerating medications well.  Patient is monitoring Blood Pressure at home.  Average readings if yes are   8-3   147/86   8-11 143/77  8-16 129/73  8-29 131/76  Overall, blood pressures have improved since starting Amlodipine 5 mg daily on 7/29/24. Pt denies side effects from medication.   Current complaints: none  Disposition:  Forwarded to provider for review.

## 2024-10-17 ENCOUNTER — ANTICOAGULATION THERAPY VISIT (OUTPATIENT)
Dept: ANTICOAGULATION | Facility: CLINIC | Age: 89
End: 2024-10-17

## 2024-10-17 ENCOUNTER — LAB (OUTPATIENT)
Dept: LAB | Facility: CLINIC | Age: 89
End: 2024-10-17
Payer: COMMERCIAL

## 2024-10-17 DIAGNOSIS — Z79.01 LONG TERM CURRENT USE OF ANTICOAGULANT THERAPY: ICD-10-CM

## 2024-10-17 DIAGNOSIS — Z86.711 HISTORY OF PULMONARY EMBOLUS (PE): ICD-10-CM

## 2024-10-17 DIAGNOSIS — I48.0 PAROXYSMAL ATRIAL FIBRILLATION (H): ICD-10-CM

## 2024-10-17 DIAGNOSIS — Z79.01 LONG TERM CURRENT USE OF ANTICOAGULANT THERAPY: Primary | ICD-10-CM

## 2024-10-17 LAB — INR BLD: 2.5 (ref 0.9–1.1)

## 2024-10-17 PROCEDURE — 36416 COLLJ CAPILLARY BLOOD SPEC: CPT

## 2024-10-17 PROCEDURE — 85610 PROTHROMBIN TIME: CPT

## 2024-10-17 NOTE — PROGRESS NOTES
ANTICOAGULATION MANAGEMENT     Ginna SCHNEIDER Junaid 89 year old female is on warfarin with therapeutic INR result. (Goal INR 2.0-3.0)    Recent labs: (last 7 days)     10/17/24  1505   INR 2.5*       ASSESSMENT     Source(s): Chart Review and Patient/Caregiver Call     Warfarin doses taken: Warfarin taken as instructed  Diet: No new diet changes identified  Medication/supplement changes: None noted  New illness, injury, or hospitalization: No  Signs or symptoms of bleeding or clotting: No  Previous result: Therapeutic last 2(+) visits  Additional findings: None       PLAN     Recommended plan for no diet, medication or health factor changes affecting INR     Dosing Instructions: Continue your current warfarin dose with next INR in 6 weeks       Summary  As of 10/17/2024      Full warfarin instructions:  2 mg every Sun, Tue, Thu; 1 mg all other days   Next INR check:  11/29/2024               Telephone call with Ginna who verbalizes understanding and agrees to plan    Patient offered & declined to schedule next visit    Education provided: Please call back if any changes to your diet, medications or how you've been taking warfarin  Goal range and lab monitoring: goal range and significance of current result and Importance of therapeutic range  Contact [phone] with any changes, questions or concerns.     Plan made per [facility] anticoagulation protocol    Gretta Maxwell RN  10/17/2024  Anticoagulation Clinic  [facility] for routing messages: [place] [facility]  [facility] patient phone line: [phone]        _______________________________________________________________________     Anticoagulation Episode Summary       Current INR goal:  2.0-3.0   TTR:  77.8% (1 y)   Target end date:  Indefinite   Send INR reminders to:  PATRICIA BLACKBURN    Indications    Long term current use of anticoagulant therapy [Z79.01]  Paroxysmal atrial fibrillation (H) [I48.0]  History of pulmonary embolus (PE) [Z86.711]              Comments:               Anticoagulation Care Providers       Provider Role Specialty Phone number    Dawson Cloud MD Referring Internal Medicine [phone]

## 2024-11-19 DIAGNOSIS — Z86.711 HISTORY OF PULMONARY EMBOLUS (PE): ICD-10-CM

## 2024-11-19 DIAGNOSIS — Z79.01 LONG TERM CURRENT USE OF ANTICOAGULANT THERAPY: ICD-10-CM

## 2024-11-19 RX ORDER — WARFARIN SODIUM 2 MG/1
1-2 TABLET ORAL DAILY
Qty: 90 TABLET | Refills: 1 | Status: SHIPPED | OUTPATIENT
Start: 2024-11-19

## 2024-11-19 NOTE — TELEPHONE ENCOUNTER
ANTICOAGULATION MANAGEMENT:  Medication Refill    Anticoagulation Summary  As of 10/17/2024      Warfarin maintenance plan:  2 mg (2 mg x 1) every Sun, Tue, Thu; 1 mg (2 mg x 0.5) all other days   Next INR check:  11/29/2024   Target end date:  Indefinite    Indications    Long term current use of anticoagulant therapy [Z79.01]  Paroxysmal atrial fibrillation (H) [I48.0]  History of pulmonary embolus (PE) [Z86.711]                 Anticoagulation Care Providers       Provider Role Specialty Phone number    Dawson Cloud MD Referring Internal Medicine [phone]            Refill Criteria    Visit with referring provider/group: Meets criteria: visit within referring provider group in the last 15 months on 7/25/24    [facility] referral last signed: 07/23/2024; within last year:  Yes    Lab monitoring not exceeding 2 weeks overdue: Yes    Ginna meets all criteria for refill. Rx instructions and quantity supplied updated to match patient's current dosing plan. Warfarin 90 day supply with 1 refill granted per [facility] protocol     Gretta Maxwell RN  Anticoagulation Clinic

## 2024-11-21 ENCOUNTER — ANTICOAGULATION THERAPY VISIT (OUTPATIENT)
Dept: ANTICOAGULATION | Facility: CLINIC | Age: 89
End: 2024-11-21

## 2024-11-21 ENCOUNTER — LAB (OUTPATIENT)
Dept: LAB | Facility: CLINIC | Age: 89
End: 2024-11-21
Payer: COMMERCIAL

## 2024-11-21 DIAGNOSIS — Z79.01 LONG TERM CURRENT USE OF ANTICOAGULANT THERAPY: ICD-10-CM

## 2024-11-21 DIAGNOSIS — Z86.711 HISTORY OF PULMONARY EMBOLUS (PE): ICD-10-CM

## 2024-11-21 DIAGNOSIS — I48.0 PAROXYSMAL ATRIAL FIBRILLATION (H): ICD-10-CM

## 2024-11-21 DIAGNOSIS — Z79.01 LONG TERM CURRENT USE OF ANTICOAGULANT THERAPY: Primary | ICD-10-CM

## 2024-11-21 LAB — INR BLD: 2 (ref 0.9–1.1)

## 2024-11-21 NOTE — PROGRESS NOTES
ANTICOAGULATION MANAGEMENT     Ginna Quiorga 89 year old female is on warfarin with therapeutic INR result. (Goal INR 2.0-3.0)    Recent labs: (last 7 days)     11/21/24  1329   INR 2.0*       ASSESSMENT     Source(s): Chart Review and Patient/Caregiver Call     Warfarin doses taken: Warfarin taken as instructed  Diet: No new diet changes identified  Medication/supplement changes: None noted  New illness, injury, or hospitalization: No  Signs or symptoms of bleeding or clotting: No  Previous result: Therapeutic last 2(+) visits  Additional findings: None       PLAN     Recommended plan for no diet, medication or health factor changes affecting INR     Dosing Instructions: Continue your current warfarin dose with next INR in 6 weeks       Summary  As of 11/21/2024      Full warfarin instructions:  2 mg every Sun, Tue, Thu; 1 mg all other days   Next INR check:  1/2/2025               Telephone call with Ginna who agrees to plan and repeated back plan correctly    Patient offered & declined to schedule next visit    Education provided: Please call back if any changes to your diet, medications or how you've been taking warfarin  Goal range and lab monitoring: goal range and significance of current result and Importance of therapeutic range  Contact [phone] with any changes, questions or concerns.     Plan made per [facility] anticoagulation protocol    Gretta Maxwell RN  11/21/2024  Anticoagulation Clinic  [employer] for routing messages: carolina BLACKBURN  [facility] patient phone line: [phone]        _______________________________________________________________________     Anticoagulation Episode Summary       Current INR goal:  2.0-3.0   TTR:  77.8% (1 y)   Target end date:  Indefinite   Send INR reminders to:  PATRICIA BLACKBURN    Indications    Long term current use of anticoagulant therapy [Z79.01]  Paroxysmal atrial fibrillation (H) [I48.0]  History of pulmonary embolus (PE) [Z86.711]              Comments:  --             Anticoagulation Care Providers       Provider Role Specialty Phone number    Dawson Cloud MD Referring Internal Medicine [phone]

## 2025-01-09 ENCOUNTER — LAB (OUTPATIENT)
Dept: LAB | Facility: CLINIC | Age: OVER 89
End: 2025-01-09
Payer: COMMERCIAL

## 2025-01-09 ENCOUNTER — ANTICOAGULATION THERAPY VISIT (OUTPATIENT)
Dept: ANTICOAGULATION | Facility: CLINIC | Age: OVER 89
End: 2025-01-09

## 2025-01-09 DIAGNOSIS — I48.0 PAROXYSMAL ATRIAL FIBRILLATION (H): ICD-10-CM

## 2025-01-09 DIAGNOSIS — Z79.01 LONG TERM CURRENT USE OF ANTICOAGULANT THERAPY: Primary | ICD-10-CM

## 2025-01-09 DIAGNOSIS — Z86.711 HISTORY OF PULMONARY EMBOLUS (PE): ICD-10-CM

## 2025-01-09 DIAGNOSIS — Z79.01 LONG TERM CURRENT USE OF ANTICOAGULANT THERAPY: ICD-10-CM

## 2025-01-09 LAB — INR BLD: 2.5 (ref 0.9–1.1)

## 2025-01-09 NOTE — PROGRESS NOTES
ANTICOAGULATION MANAGEMENT     Ginna Quiroga 89 year old female is on warfarin with therapeutic INR result. (Goal INR 2.0-3.0)    Recent labs: (last 7 days)     01/09/25  1559   INR 2.5*       ASSESSMENT     Source(s): Chart Review and Patient/Caregiver Call     Warfarin doses taken: Warfarin taken as instructed  Diet: No new diet changes identified  Medication/supplement changes: None noted  New illness, injury, or hospitalization: No  Signs or symptoms of bleeding or clotting: No  Previous result: Therapeutic last 2(+) visits  Additional findings: None       PLAN     Recommended plan for no diet, medication or health factor changes affecting INR     Dosing Instructions: Continue your current warfarin dose with next INR in 6 weeks       Summary  As of 1/9/2025      Full warfarin instructions:  2 mg every Sun, Tue, Thu; 1 mg all other days   Next INR check:  2/20/2025               Telephone call with Ginna who verbalizes understanding and agrees to plan    Patient offered & declined to schedule next visit    Education provided: Contact [phone] with any changes, questions or concerns.     Plan made per [facility] anticoagulation protocol    Kaley Gonzalez RN  1/9/2025  Anticoagulation Clinic  [employer] for routing messages: carolina BLACKBURN  [facility] patient phone line: [phone]        _______________________________________________________________________     Anticoagulation Episode Summary       Current INR goal:  2.0-3.0   TTR:  88.3% (1 y)   Target end date:  Indefinite   Send INR reminders to:  PATRICIA BLACKBURN    Indications    Long term current use of anticoagulant therapy [Z79.01]  Paroxysmal atrial fibrillation (H) [I48.0]  History of pulmonary embolus (PE) [Z86.711]             Comments:  --             Anticoagulation Care Providers       Provider Role Specialty Phone number    Dawson Cloud MD Referring Internal Medicine [phone]

## 2025-02-20 ENCOUNTER — ANTICOAGULATION THERAPY VISIT (OUTPATIENT)
Dept: ANTICOAGULATION | Facility: CLINIC | Age: OVER 89
End: 2025-02-20

## 2025-02-20 ENCOUNTER — LAB (OUTPATIENT)
Dept: LAB | Facility: CLINIC | Age: OVER 89
End: 2025-02-20
Payer: COMMERCIAL

## 2025-02-20 DIAGNOSIS — I48.0 PAROXYSMAL ATRIAL FIBRILLATION (H): ICD-10-CM

## 2025-02-20 DIAGNOSIS — Z86.711 HISTORY OF PULMONARY EMBOLUS (PE): ICD-10-CM

## 2025-02-20 DIAGNOSIS — Z79.01 LONG TERM CURRENT USE OF ANTICOAGULANT THERAPY: ICD-10-CM

## 2025-02-20 DIAGNOSIS — N18.30 CKD (CHRONIC KIDNEY DISEASE) STAGE 3, GFR 30-59 ML/MIN (H): Primary | ICD-10-CM

## 2025-02-20 DIAGNOSIS — Z79.01 LONG TERM CURRENT USE OF ANTICOAGULANT THERAPY: Primary | ICD-10-CM

## 2025-02-20 LAB
HGB BLD-MCNC: 13.2 G/DL (ref 11.7–15.7)
INR BLD: 2.3 (ref 0.9–1.1)

## 2025-02-20 NOTE — PROGRESS NOTES
ANTICOAGULATION MANAGEMENT     Ginna Quiroga 89 year old female is on warfarin with therapeutic INR result. (Goal INR 2.0-3.0)    Recent labs: (last 7 days)     02/20/25  1601   INR 2.3*         ASSESSMENT     Source(s): Chart Review  Previous INR was Therapeutic last 2(+) visits  Medication, diet, health changes since last INR chart reviewed; none identified         PLAN     Recommended plan for no diet, medication or health factor changes affecting INR     Dosing Instructions: Continue your current warfarin dose with next INR in 6 weeks       Summary  As of 2/20/2025      Full warfarin instructions:  2 mg every Sun, Tue, Thu; 1 mg all other days   Next INR check:  4/3/2025               Detailed voice message left for Ginna with dosing instructions and follow up date.     Contact [phone] to schedule and with any changes, questions or concerns.     Education provided: Please call back if any changes to your diet, medications or how you've been taking warfarin  Taking warfarin: Importance of taking warfarin as instructed    Plan made per [facility] anticoagulation protocol    Dasia Kim RN  2/20/2025  Anticoagulation Clinic  [employer] for routing messages: carolina BLACKBURN  [facility] patient phone line: [phone]        _______________________________________________________________________     Anticoagulation Episode Summary       Current INR goal:  2.0-3.0   TTR:  88.3% (1 y)   Target end date:  Indefinite   Send INR reminders to:  PATRICIA BLACKBURN    Indications    Long term current use of anticoagulant therapy [Z79.01]  Paroxysmal atrial fibrillation (H) [I48.0]  History of pulmonary embolus (PE) [Z86.711]             Comments:  --             Anticoagulation Care Providers       Provider Role Specialty Phone number    Dawson Cloud MD Referring Internal Medicine [phone]

## 2025-03-27 ENCOUNTER — PATIENT OUTREACH (OUTPATIENT)
Dept: CARE COORDINATION | Facility: CLINIC | Age: OVER 89
End: 2025-03-27
Payer: COMMERCIAL

## 2025-03-28 ENCOUNTER — TRANSFERRED RECORDS (OUTPATIENT)
Dept: HEALTH INFORMATION MANAGEMENT | Facility: CLINIC | Age: OVER 89
End: 2025-03-28
Payer: COMMERCIAL

## 2025-04-03 ENCOUNTER — LAB (OUTPATIENT)
Dept: LAB | Facility: CLINIC | Age: OVER 89
End: 2025-04-03
Payer: COMMERCIAL

## 2025-04-03 ENCOUNTER — ANTICOAGULATION THERAPY VISIT (OUTPATIENT)
Dept: ANTICOAGULATION | Facility: CLINIC | Age: OVER 89
End: 2025-04-03

## 2025-04-03 DIAGNOSIS — Z79.01 LONG TERM CURRENT USE OF ANTICOAGULANT THERAPY: Primary | ICD-10-CM

## 2025-04-03 DIAGNOSIS — Z13.220 LIPID SCREENING: ICD-10-CM

## 2025-04-03 DIAGNOSIS — Z86.711 HISTORY OF PULMONARY EMBOLUS (PE): ICD-10-CM

## 2025-04-03 DIAGNOSIS — I48.0 PAROXYSMAL ATRIAL FIBRILLATION (H): ICD-10-CM

## 2025-04-03 DIAGNOSIS — Z79.01 LONG TERM CURRENT USE OF ANTICOAGULANT THERAPY: ICD-10-CM

## 2025-04-03 DIAGNOSIS — N18.30 CKD (CHRONIC KIDNEY DISEASE) STAGE 3, GFR 30-59 ML/MIN (H): Primary | ICD-10-CM

## 2025-04-03 LAB — INR BLD: 2.3 (ref 0.9–1.1)

## 2025-04-03 NOTE — PROGRESS NOTES
ANTICOAGULATION MANAGEMENT     Ginna Quiroga 89 year old female is on warfarin with therapeutic INR result. (Goal INR 2.0-3.0)    Recent labs: (last 7 days)     04/03/25  1512   INR 2.3*       ASSESSMENT     Source(s): Chart Review and Patient/Caregiver Call     Warfarin doses taken: Warfarin taken as instructed  Diet: No new diet changes identified  Medication/supplement changes: None noted  New illness, injury, or hospitalization: No  Signs or symptoms of bleeding or clotting: No  Previous result: Therapeutic last 2(+) visits  Additional findings: None       PLAN     Recommended plan for no diet, medication or health factor changes affecting INR     Dosing Instructions: Continue your current warfarin dose with next INR in 6 weeks       Summary  As of 4/3/2025      Full warfarin instructions:  2 mg every Sun, Tue, Thu; 1 mg all other days   Next INR check:  5/15/2025               Telephone call with Ginna who verbalizes understanding and agrees to plan    Patient offered & declined to schedule next visit    Education provided: Goal range and lab monitoring: goal range and significance of current result    Plan made per [facility] anticoagulation protocol    iDxie Cordoba RN  4/3/2025  Anticoagulation Clinic  [employer] for routing messages: carolina BLACKBURN  [facility] patient phone line: [phone]        _______________________________________________________________________     Anticoagulation Episode Summary       Current INR goal:  2.0-3.0   TTR:  93.3% (1 y)   Target end date:  Indefinite   Send INR reminders to:  PATRICIA BLACKBURN    Indications    Long term current use of anticoagulant therapy [Z79.01]  Paroxysmal atrial fibrillation (H) [I48.0]  History of pulmonary embolus (PE) [Z86.711]             Comments:  --             Anticoagulation Care Providers       Provider Role Specialty Phone number    Dawson Cloud MD Referring Internal Medicine [phone]

## 2025-04-10 ENCOUNTER — PATIENT OUTREACH (OUTPATIENT)
Dept: CARE COORDINATION | Facility: CLINIC | Age: OVER 89
End: 2025-04-10
Payer: COMMERCIAL

## 2025-05-15 DIAGNOSIS — I10 ESSENTIAL HYPERTENSION: ICD-10-CM

## 2025-05-15 RX ORDER — VALSARTAN 320 MG/1
320 TABLET ORAL DAILY
Qty: 90 TABLET | Refills: 0 | Status: SHIPPED | OUTPATIENT
Start: 2025-05-15

## 2025-05-20 ENCOUNTER — RESULTS FOLLOW-UP (OUTPATIENT)
Dept: ANTICOAGULATION | Facility: CLINIC | Age: OVER 89
End: 2025-05-20

## 2025-05-20 ENCOUNTER — LAB (OUTPATIENT)
Dept: LAB | Facility: CLINIC | Age: OVER 89
End: 2025-05-20
Payer: COMMERCIAL

## 2025-05-20 ENCOUNTER — ANTICOAGULATION THERAPY VISIT (OUTPATIENT)
Dept: ANTICOAGULATION | Facility: CLINIC | Age: OVER 89
End: 2025-05-20

## 2025-05-20 DIAGNOSIS — Z86.711 HISTORY OF PULMONARY EMBOLUS (PE): ICD-10-CM

## 2025-05-20 DIAGNOSIS — Z79.01 LONG TERM CURRENT USE OF ANTICOAGULANT THERAPY: Primary | ICD-10-CM

## 2025-05-20 DIAGNOSIS — I48.0 PAROXYSMAL ATRIAL FIBRILLATION (H): ICD-10-CM

## 2025-05-20 DIAGNOSIS — Z79.01 LONG TERM CURRENT USE OF ANTICOAGULANT THERAPY: ICD-10-CM

## 2025-05-20 LAB — INR BLD: 2.3 (ref 0.9–1.1)

## 2025-05-20 PROCEDURE — 36416 COLLJ CAPILLARY BLOOD SPEC: CPT

## 2025-05-20 PROCEDURE — 85610 PROTHROMBIN TIME: CPT

## 2025-05-20 NOTE — PROGRESS NOTES
ANTICOAGULATION MANAGEMENT     Ginna Quiroga 89 year old female is on warfarin with therapeutic INR result. (Goal INR 2.0-3.0)    Recent labs: (last 7 days)     05/20/25  1452   INR 2.3*       ASSESSMENT     Source(s): Chart Review and Patient/Caregiver Call     Warfarin doses taken: Warfarin taken as instructed  Diet: No new diet changes identified  Medication/supplement changes: None noted  New illness, injury, or hospitalization: No  Signs or symptoms of bleeding or clotting: No  Previous result: Therapeutic last 2(+) visits  Additional findings: None       PLAN     Recommended plan for no diet, medication or health factor changes affecting INR     Dosing Instructions: Continue your current warfarin dose with next INR in 6 weeks       Summary  As of 5/20/2025      Full warfarin instructions:  2 mg every Sun, Tue, Thu; 1 mg all other days   Next INR check:  7/1/2025               Telephone call with Ginna who agrees to plan and repeated back plan correctly    Patient offered & declined to schedule next visit    Education provided: Please call back if any changes to your diet, medications or how you've been taking warfarin  Goal range and lab monitoring: goal range and significance of current result and Importance of therapeutic range  Contact [phone] with any changes, questions or concerns.     Plan made per [facility] anticoagulation protocol    Gretta Maxwell RN  5/20/2025  Anticoagulation Clinic  [employer] for routing messages: carolina BLACKBURN  [facility] patient phone line: [phone]        _______________________________________________________________________     Anticoagulation Episode Summary       Current INR goal:  2.0-3.0   TTR:  97.7% (1 y)   Target end date:  Indefinite   Send INR reminders to:  PATRICIA BLACKBURN    Indications    Long term current use of anticoagulant therapy [Z79.01]  Paroxysmal atrial fibrillation (H) [I48.0]  History of pulmonary embolus (PE) [Z86.711]              Comments:  --             Anticoagulation Care Providers       Provider Role Specialty Phone number    Dawson Cloud MD Referring Internal Medicine [phone]

## 2025-06-07 ENCOUNTER — HEALTH MAINTENANCE LETTER (OUTPATIENT)
Age: OVER 89
End: 2025-06-07

## 2025-07-02 ENCOUNTER — ANTICOAGULATION THERAPY VISIT (OUTPATIENT)
Dept: ANTICOAGULATION | Facility: CLINIC | Age: OVER 89
End: 2025-07-02

## 2025-07-02 ENCOUNTER — LAB (OUTPATIENT)
Dept: LAB | Facility: CLINIC | Age: OVER 89
End: 2025-07-02
Payer: COMMERCIAL

## 2025-07-02 DIAGNOSIS — I48.0 PAROXYSMAL ATRIAL FIBRILLATION (H): ICD-10-CM

## 2025-07-02 DIAGNOSIS — Z79.01 LONG TERM CURRENT USE OF ANTICOAGULANT THERAPY: ICD-10-CM

## 2025-07-02 DIAGNOSIS — Z86.711 HISTORY OF PULMONARY EMBOLUS (PE): ICD-10-CM

## 2025-07-02 DIAGNOSIS — Z79.01 LONG TERM CURRENT USE OF ANTICOAGULANT THERAPY: Primary | ICD-10-CM

## 2025-07-02 LAB — INR BLD: 1.9 (ref 0.9–1.1)

## 2025-07-02 PROCEDURE — 85610 PROTHROMBIN TIME: CPT

## 2025-07-02 PROCEDURE — 36416 COLLJ CAPILLARY BLOOD SPEC: CPT

## 2025-07-02 NOTE — PROGRESS NOTES
ANTICOAGULATION MANAGEMENT     Ginna Quiroga 89 year old female is on warfarin with subtherapeutic INR result. (Goal INR 2.0-3.0)    Recent labs: (last 7 days)     07/02/25  1602   INR 1.9*       ASSESSMENT     Source(s): Chart Review and Patient/Caregiver Call     Warfarin doses taken: Warfarin taken as instructed  Diet: No new diet changes identified  Medication/supplement changes: None noted  New illness, injury, or hospitalization: No  Signs or symptoms of bleeding or clotting: No  Previous result: Therapeutic last 2(+) visits  Additional findings: None       PLAN     Recommended plan for no diet, medication or health factor changes affecting INR     Dosing Instructions: Continue your current warfarin dose with next INR in 2 weeks       Summary  As of 7/2/2025      Full warfarin instructions:  2 mg every Sun, Tue, Thu; 1 mg all other days   Next INR check:  7/16/2025               Telephone call with Ginna who verbalizes understanding and agrees to plan    Contact [phone] to schedule and with any changes, questions or concerns.     Education provided: Please call back if any changes to your diet, medications or how you've been taking warfarin    Plan made per [facility] anticoagulation protocol    Viktor Hernandez RN  7/2/2025  Anticoagulation Clinic  [employer] for routing messages: carolina BLACK[place]URN  [facility] patient phone line: [phone]        _______________________________________________________________________     Anticoagulation Episode Summary       Current INR goal:  2.0-3.0   TTR:  97.1% (1 y)   Target end date:  Indefinite   Send INR reminders to:  PATRICIA BLACKBURN    Indications    Long term current use of anticoagulant therapy [Z79.01]  Paroxysmal atrial fibrillation (H) [I48.0]  History of pulmonary embolus (PE) [Z86.711]             Comments:  --             Anticoagulation Care Providers       Provider Role Specialty Phone number    Dawson Cloud MD Referring Internal Medicine  [phone]

## 2025-07-07 ENCOUNTER — TELEPHONE (OUTPATIENT)
Dept: INTERNAL MEDICINE | Facility: CLINIC | Age: OVER 89
End: 2025-07-07
Payer: COMMERCIAL

## 2025-07-07 NOTE — TELEPHONE ENCOUNTER
Tried calling Pt to get scheduled for AWV per PCP, (see previous encounter).  No answer. No VM    Mitul Evans

## 2025-07-08 NOTE — TELEPHONE ENCOUNTER
Called Pt and son, Bryce, on [place].. Left VM on home phone.  If Pt calls back, please schedule AWV per PCP.    Mitul Evans

## 2025-07-15 DIAGNOSIS — I10 ESSENTIAL HYPERTENSION: ICD-10-CM

## 2025-07-15 RX ORDER — AMLODIPINE BESYLATE 5 MG/1
5 TABLET ORAL DAILY
Qty: 90 TABLET | Refills: 3 | Status: SHIPPED | OUTPATIENT
Start: 2025-07-15

## 2025-07-16 ENCOUNTER — ANTICOAGULATION THERAPY VISIT (OUTPATIENT)
Dept: ANTICOAGULATION | Facility: CLINIC | Age: OVER 89
End: 2025-07-16

## 2025-07-16 ENCOUNTER — LAB (OUTPATIENT)
Dept: LAB | Facility: CLINIC | Age: OVER 89
End: 2025-07-16
Payer: COMMERCIAL

## 2025-07-16 ENCOUNTER — DOCUMENTATION ONLY (OUTPATIENT)
Dept: ANTICOAGULATION | Facility: CLINIC | Age: OVER 89
End: 2025-07-16

## 2025-07-16 DIAGNOSIS — Z86.711 HISTORY OF PULMONARY EMBOLUS (PE): ICD-10-CM

## 2025-07-16 DIAGNOSIS — Z79.01 LONG TERM CURRENT USE OF ANTICOAGULANT THERAPY: Primary | ICD-10-CM

## 2025-07-16 DIAGNOSIS — Z79.01 LONG TERM CURRENT USE OF ANTICOAGULANT THERAPY: ICD-10-CM

## 2025-07-16 DIAGNOSIS — I48.0 PAROXYSMAL ATRIAL FIBRILLATION (H): ICD-10-CM

## 2025-07-16 LAB — INR BLD: 2.5 (ref 0.9–1.1)

## 2025-07-16 PROCEDURE — 36416 COLLJ CAPILLARY BLOOD SPEC: CPT

## 2025-07-16 PROCEDURE — 85610 PROTHROMBIN TIME: CPT

## 2025-07-16 NOTE — PROGRESS NOTES
ANTICOAGULATION MANAGEMENT     Ginna Quiroga 89 year old female is on warfarin with therapeutic INR result. (Goal INR 2.0-3.0)    Recent labs: (last 7 days)     07/16/25  1310   INR 2.5*       ASSESSMENT     Source(s): Chart Review and Patient/Caregiver Call     Warfarin doses taken: Warfarin taken as instructed  Diet: No new diet changes identified  Medication/supplement changes: None noted  New illness, injury, or hospitalization: No  Signs or symptoms of bleeding or clotting: No  Previous result: Subtherapeutic  Additional findings: None       PLAN     Recommended plan for no diet, medication or health factor changes affecting INR     Dosing Instructions: Continue your current warfarin dose with next INR in 3 weeks       Summary  As of 7/16/2025      Full warfarin instructions:  2 mg every Sun, Tue, Thu; 1 mg all other days   Next INR check:  8/6/2025               Telephone call with Ginna who agrees to plan and repeated back plan correctly    Patient offered & declined to schedule next visit    Education provided: Please call back if any changes to your diet, medications or how you've been taking warfarin  Goal range and lab monitoring: goal range and significance of current result and Importance of therapeutic range  Contact [phone] with any changes, questions or concerns.     Plan made per [facility] anticoagulation protocol    Gretta Maxwell RN  7/16/2025  Anticoagulation Clinic  [facility] for routing messages: carolina [place]URN  [facility] patient phone line: [phone]        _______________________________________________________________________     Anticoagulation Episode Summary       Current INR goal:  2.0-3.0   TTR:  96.4% (1 y)   Target end date:  Indefinite   Send INR reminders to:  PATRICIA BLACKBURN    Indications    Long term current use of anticoagulant therapy [Z79.01]  Paroxysmal atrial fibrillation (H) [I48.0]  History of pulmonary embolus (PE) [Z86.581]             Comments:  --              Anticoagulation Care Providers       Provider Role Specialty Phone number    Dawson Cloud MD Referring Internal Medicine [phone]

## 2025-07-16 NOTE — PROGRESS NOTES
ANTICOAGULATION CLINIC REFERRAL RENEWAL REQUEST       An annual renewal order is required for all patients referred to [facility].?  Please review and sign the pended referral order for Ginna Quiroga.       ANTICOAGULATION SUMMARY      Warfarin indication(s)   Atrial Fibrillation and PE    Mechanical heart valve present?  NO       Current goal range   INR: 2.0-3.0     Goal appropriate for indication? Goal INR 2-3, standard for indication(s) above     Time in Therapeutic Range (TTR)  (Goal > 60%) 96.4%       Office visit with referring provider's group within last year yes on 7/25/24   Additional standing orders None       Gretta Maxwell, GUNNAR  [facility]

## 2025-07-22 ENCOUNTER — OFFICE VISIT (OUTPATIENT)
Dept: INTERNAL MEDICINE | Facility: CLINIC | Age: OVER 89
End: 2025-07-22
Payer: COMMERCIAL

## 2025-07-22 VITALS
SYSTOLIC BLOOD PRESSURE: 160 MMHG | HEIGHT: 64 IN | WEIGHT: 157 LBS | DIASTOLIC BLOOD PRESSURE: 90 MMHG | OXYGEN SATURATION: 97 % | BODY MASS INDEX: 26.8 KG/M2 | HEART RATE: 67 BPM | TEMPERATURE: 98.3 F | RESPIRATION RATE: 14 BRPM

## 2025-07-22 DIAGNOSIS — Z95.0 STATUS POST BIVENTRICULAR CARDIAC PACEMAKER INSERTION: ICD-10-CM

## 2025-07-22 DIAGNOSIS — M15.0 PRIMARY OSTEOARTHRITIS INVOLVING MULTIPLE JOINTS: ICD-10-CM

## 2025-07-22 DIAGNOSIS — Z13.220 LIPID SCREENING: ICD-10-CM

## 2025-07-22 DIAGNOSIS — M81.0 AGE-RELATED OSTEOPOROSIS WITHOUT CURRENT PATHOLOGICAL FRACTURE: ICD-10-CM

## 2025-07-22 DIAGNOSIS — I48.0 PAROXYSMAL ATRIAL FIBRILLATION (H): ICD-10-CM

## 2025-07-22 DIAGNOSIS — I10 ESSENTIAL HYPERTENSION: ICD-10-CM

## 2025-07-22 DIAGNOSIS — E55.9 VITAMIN D DEFICIENCY: ICD-10-CM

## 2025-07-22 DIAGNOSIS — Z86.711 HISTORY OF PULMONARY EMBOLUS (PE): ICD-10-CM

## 2025-07-22 DIAGNOSIS — R60.0 PERIPHERAL EDEMA: ICD-10-CM

## 2025-07-22 DIAGNOSIS — N18.31 STAGE 3A CHRONIC KIDNEY DISEASE (H): ICD-10-CM

## 2025-07-22 DIAGNOSIS — Z00.00 ENCOUNTER FOR MEDICARE ANNUAL WELLNESS EXAM: Primary | ICD-10-CM

## 2025-07-22 DIAGNOSIS — I49.5 TACHY-BRADY SYNDROME (H): ICD-10-CM

## 2025-07-22 DIAGNOSIS — Z79.01 LONG TERM CURRENT USE OF ANTICOAGULANT THERAPY: ICD-10-CM

## 2025-07-22 DIAGNOSIS — G31.84 MILD COGNITIVE IMPAIRMENT: ICD-10-CM

## 2025-07-22 DIAGNOSIS — Z51.81 ENCOUNTER FOR THERAPEUTIC DRUG MONITORING: ICD-10-CM

## 2025-07-22 LAB
ALBUMIN SERPL BCG-MCNC: 4.3 G/DL (ref 3.5–5.2)
ALBUMIN UR-MCNC: NEGATIVE MG/DL
ALP SERPL-CCNC: 119 U/L (ref 40–150)
ALT SERPL W P-5'-P-CCNC: 25 U/L (ref 0–50)
ANION GAP SERPL CALCULATED.3IONS-SCNC: 12 MMOL/L (ref 7–15)
APPEARANCE UR: CLEAR
AST SERPL W P-5'-P-CCNC: 31 U/L (ref 0–45)
BILIRUB SERPL-MCNC: 0.7 MG/DL
BILIRUB UR QL STRIP: NEGATIVE
BUN SERPL-MCNC: 23.5 MG/DL (ref 8–23)
CALCIUM SERPL-MCNC: 10 MG/DL (ref 8.8–10.4)
CHLORIDE SERPL-SCNC: 102 MMOL/L (ref 98–107)
CHOLEST SERPL-MCNC: 189 MG/DL
COLOR UR AUTO: YELLOW
CREAT SERPL-MCNC: 0.94 MG/DL (ref 0.51–0.95)
EGFRCR SERPLBLD CKD-EPI 2021: 57 ML/MIN/1.73M2
ERYTHROCYTE [DISTWIDTH] IN BLOOD BY AUTOMATED COUNT: 14.3 % (ref 10–15)
FASTING STATUS PATIENT QL REPORTED: YES
FASTING STATUS PATIENT QL REPORTED: YES
GLUCOSE SERPL-MCNC: 101 MG/DL (ref 70–99)
GLUCOSE UR STRIP-MCNC: NEGATIVE MG/DL
HCO3 SERPL-SCNC: 28 MMOL/L (ref 22–29)
HCT VFR BLD AUTO: 42.3 % (ref 35–47)
HDLC SERPL-MCNC: 57 MG/DL
HGB BLD-MCNC: 14 G/DL (ref 11.7–15.7)
HGB UR QL STRIP: NEGATIVE
KETONES UR STRIP-MCNC: NEGATIVE MG/DL
LDLC SERPL CALC-MCNC: 113 MG/DL
LEUKOCYTE ESTERASE UR QL STRIP: NEGATIVE
MAGNESIUM SERPL-MCNC: 2.2 MG/DL (ref 1.7–2.3)
MCH RBC QN AUTO: 28.7 PG (ref 26.5–33)
MCHC RBC AUTO-ENTMCNC: 33.1 G/DL (ref 31.5–36.5)
MCV RBC AUTO: 87 FL (ref 78–100)
NITRATE UR QL: NEGATIVE
NONHDLC SERPL-MCNC: 132 MG/DL
PH UR STRIP: 7 [PH] (ref 5–8)
PLATELET # BLD AUTO: 211 10E3/UL (ref 150–450)
POTASSIUM SERPL-SCNC: 4.1 MMOL/L (ref 3.4–5.3)
PROT SERPL-MCNC: 7.8 G/DL (ref 6.4–8.3)
RBC # BLD AUTO: 4.88 10E6/UL (ref 3.8–5.2)
SODIUM SERPL-SCNC: 142 MMOL/L (ref 135–145)
SP GR UR STRIP: 1.01 (ref 1–1.03)
TRIGL SERPL-MCNC: 94 MG/DL
TSH SERPL DL<=0.005 MIU/L-ACNC: 2.25 UIU/ML (ref 0.3–4.2)
UROBILINOGEN UR STRIP-ACNC: 0.2 E.U./DL
VIT D+METAB SERPL-MCNC: 56 NG/ML (ref 20–50)
WBC # BLD AUTO: 7.5 10E3/UL (ref 4–11)

## 2025-07-22 PROCEDURE — 85027 COMPLETE CBC AUTOMATED: CPT | Performed by: INTERNAL MEDICINE

## 2025-07-22 PROCEDURE — 36415 COLL VENOUS BLD VENIPUNCTURE: CPT | Performed by: INTERNAL MEDICINE

## 2025-07-22 PROCEDURE — 3079F DIAST BP 80-89 MM HG: CPT | Performed by: INTERNAL MEDICINE

## 2025-07-22 PROCEDURE — 99214 OFFICE O/P EST MOD 30 MIN: CPT | Mod: 25 | Performed by: INTERNAL MEDICINE

## 2025-07-22 PROCEDURE — 3077F SYST BP >= 140 MM HG: CPT | Performed by: INTERNAL MEDICINE

## 2025-07-22 PROCEDURE — 84443 ASSAY THYROID STIM HORMONE: CPT | Performed by: INTERNAL MEDICINE

## 2025-07-22 PROCEDURE — G0439 PPPS, SUBSEQ VISIT: HCPCS | Performed by: INTERNAL MEDICINE

## 2025-07-22 PROCEDURE — 80061 LIPID PANEL: CPT | Performed by: INTERNAL MEDICINE

## 2025-07-22 PROCEDURE — 83735 ASSAY OF MAGNESIUM: CPT | Performed by: INTERNAL MEDICINE

## 2025-07-22 PROCEDURE — 80053 COMPREHEN METABOLIC PANEL: CPT | Performed by: INTERNAL MEDICINE

## 2025-07-22 PROCEDURE — 82306 VITAMIN D 25 HYDROXY: CPT | Performed by: INTERNAL MEDICINE

## 2025-07-22 PROCEDURE — 81003 URINALYSIS AUTO W/O SCOPE: CPT | Performed by: INTERNAL MEDICINE

## 2025-07-22 PROCEDURE — G2211 COMPLEX E/M VISIT ADD ON: HCPCS | Performed by: INTERNAL MEDICINE

## 2025-07-22 RX ORDER — AMLODIPINE BESYLATE 10 MG/1
10 TABLET ORAL DAILY
Qty: 90 TABLET | Refills: 3 | Status: SHIPPED | OUTPATIENT
Start: 2025-07-22

## 2025-07-22 RX ORDER — ALENDRONATE SODIUM 70 MG/1
70 TABLET ORAL
Qty: 12 TABLET | Refills: 1 | Status: SHIPPED | OUTPATIENT
Start: 2025-07-22

## 2025-07-22 RX ORDER — WARFARIN SODIUM 2 MG/1
1-2 TABLET ORAL DAILY
Qty: 90 TABLET | Refills: 1 | Status: SHIPPED | OUTPATIENT
Start: 2025-07-22

## 2025-07-22 RX ORDER — METOPROLOL SUCCINATE 100 MG/1
150 TABLET, EXTENDED RELEASE ORAL DAILY
Qty: 135 TABLET | Refills: 3 | Status: SHIPPED | OUTPATIENT
Start: 2025-07-22

## 2025-07-22 RX ORDER — FUROSEMIDE 40 MG/1
TABLET ORAL
Qty: 90 TABLET | Refills: 3 | Status: SHIPPED | OUTPATIENT
Start: 2025-07-22

## 2025-07-22 RX ORDER — VALSARTAN 320 MG/1
320 TABLET ORAL DAILY
Qty: 90 TABLET | Refills: 3 | Status: SHIPPED | OUTPATIENT
Start: 2025-07-22

## 2025-07-22 SDOH — HEALTH STABILITY: PHYSICAL HEALTH: ON AVERAGE, HOW MANY DAYS PER WEEK DO YOU ENGAGE IN MODERATE TO STRENUOUS EXERCISE (LIKE A BRISK WALK)?: 0 DAYS

## 2025-07-22 ASSESSMENT — SOCIAL DETERMINANTS OF HEALTH (SDOH): HOW OFTEN DO YOU GET TOGETHER WITH FRIENDS OR RELATIVES?: MORE THAN THREE TIMES A WEEK

## 2025-07-22 NOTE — PROGRESS NOTES
Preventive Care Visit  [facility]  Dawson Cloud MD, Internal Medicine  Jul 22, 2025      Assessment & Plan     Encounter for Medicare annual wellness exam  Immunizations are reviewed and recommending RSV vaccine and getting Shingrix completed. Living will discussed.  Non-smoker.  Using minimal alcohol.  Regular exercise and good diet habits discussed.  Further colonoscopy is not recommended at her age as the risk of the procedure would likely outweigh any benefit.  She declines any further mammograms for breast cancer screening.  Last DEXA was 2021. Dementia and depression screening completed.  Recommending annual eye exam.  Recommending seeing a dentist every 6 months.  Skin exam performed and recommending regular use of sunblock.  Will screen for diabetes with fasting glucose.  Checking fasting lipid profile.      Essential hypertension  Blood pressure is not adequately controlled despite valsartan, Toprol-XL, furosemide and amlodipine.  Recommending increasing amlodipine to 10 mg daily.  Continue sodium restriction and try to get more regular exercise.  Checking appropriate labs.  I am asking her to return in 3-4 months for recheck  - Comprehensive metabolic panel (BMP + Alb, Alk Phos, ALT, AST, Total. Bili, TP); Future  - TSH with free T4 reflex; Future  - amLODIPine (NORVASC) 10 MG tablet; Take 1 tablet (10 mg) by mouth daily.  - furosemide (LASIX) 40 MG tablet; TAKE 1 TABLET(40 MG) BY MOUTH DAILY.  - metoprolol succinate ER (TOPROL XL) 100 MG 24 hr tablet; Take 1.5 tablets (150 mg) by mouth daily.  - valsartan (DIOVAN) 320 MG tablet; Take 1 tablet (320 mg) by mouth daily.    Paroxysmal atrial fibrillation (H)  History of ablation.  Anticoagulated with warfarin and continues Toprol- mg daily    Stage 3a chronic kidney disease (H)  Monitoring renal function.  She should avoid NSAIDs.    History of pulmonary embolus (PE)  Chronically anticoagulated with warfarin.  Tolerating  medication without side effects  - warfarin ANTICOAGULANT (COUMADIN/JANTOVEN) 2 MG tablet; Take 0.5-1 tablets (1-2 mg) by mouth daily. Adjust dose per INR results as directed.    Tachy-kayleen syndrome (H)  History of atrial fibrillation with ablation and subsequent placement of permanent pacemaker for sick sinus syndrome.  Interrogation every 3 months    Status post biventricular cardiac pacemaker insertion  As above    Peripheral edema  Peripheral edema generally well-controlled with furosemide  - furosemide (LASIX) 40 MG tablet; TAKE 1 TABLET(40 MG) BY MOUTH DAILY.    Long term current use of anticoagulant therapy    - warfarin ANTICOAGULANT (COUMADIN/JANTOVEN) 2 MG tablet; Take 0.5-1 tablets (1-2 mg) by mouth daily. Adjust dose per INR results as directed.    Age-related osteoporosis without current pathological fracture  I am asking her to complete another 6 months of alendronate which will complete a total of 5 years.  Thereafter, she should have a DEXA completed January 2026 to assess response and establish new baseline.  - alendronate (FOSAMAX) 70 MG tablet; Take 1 tablet (70 mg) by mouth every 7 days.  - DX Bone Density; Future    Vitamin D deficiency  Rechecking vitamin D level on replacement  - Vitamin D Deficiency; Future    Mild cognitive impairment  Stable    Primary osteoarthritis involving multiple joints  Stable    Encounter for therapeutic drug monitoring  CBC while on warfarin, renal function and electrolytes while on diuretic and LFTs using Tylenol  - CBC with platelets; Future  - Comprehensive metabolic panel (BMP + Alb, Alk Phos, ALT, AST, Total. Bili, TP); Future  - Magnesium; Future    Lipid screening    - Lipid Profile (Chol, Trig, HDL, LDL calc); Future    The longitudinal plan of care for the diagnosis(es)/condition(s) as documented were addressed during this visit. Due to the added complexity in care, I will continue to support Ginna in the subsequent management and with ongoing  "continuity of care.       Patient has been advised of split billing requirements and indicates understanding: Yes    BMI  Estimated body mass index is 27.38 kg/m  as calculated from the following:    Height as of this encounter: 1.613 m (5' 3.5\").    Weight as of this encounter: 71.2 kg (157 lb).       Counseling  Appropriate preventive services were addressed with this patient via screening, questionnaire, or discussion as appropriate for fall prevention, nutrition, physical activity, Tobacco-use cessation, social engagement, weight loss and cognition.  Checklist reviewing preventive services available has been given to the patient.  Reviewed patient's diet, addressing concerns and/or questions.   The patient was instructed to see the dentist every 6 months.   Discussed possible causes of fatigue. Updated plan of care.  Patient reported difficulty with activities of daily living were addressed today.Information on urinary incontinence and treatment options given to patient.   Reviewed preventive health counseling, as reflected in patient instructions    Follow-up  Return in about 4 months (around 11/22/2025) for Follow-up appointment.    Delon Chacko is a 89 year old, presenting for the following:  Physical (AWV, fasting)        7/22/2025     1:24 PM   Additional Questions   Roomed by            Advance Care Planning    Patient states has Health Care Directive and will send to [employer].        7/22/2025   General Health   How would you rate your overall physical health? Good   Feel stress (tense, anxious, or unable to sleep) Only a little   (!) STRESS CONCERN      7/22/2025   Nutrition   Diet: Regular (no restrictions)         7/22/2025   Exercise   Days per week of moderate/strenous exercise 0 days   (!) EXERCISE CONCERN      7/22/2025   Social Factors   Frequency of gathering with friends or relatives More than three times a week   Worry food won't last until get money to buy more No   Food not " last or not have enough money for food? No   Do you have housing? (Housing is defined as stable permanent housing and does not include staying outside in a car, in a tent, in an abandoned building, in an overnight shelter, or couch-surfing.) Yes   Are you worried about losing your housing? No   Lack of transportation? No   Unable to get utilities (heat,electricity)? No         7/22/2025   Fall Risk   Fallen 2 or more times in the past year? No   Trouble with walking or balance? Yes   Gait Speed Test Interpretation Greater than 5.01 seconds - ABNORMAL           7/22/2025   Activities of Daily Living- Home Safety   Needs help with the following daily activites Transportation    Shopping    Housework   Do you have the help that you need? Yes for Some   Safety concerns in the home None of the above       Multiple values from one day are sorted in reverse-chronological order         7/22/2025   Dental   Dentist two times every year? (!) NO         7/22/2025   Hearing Screening   Hearing concerns? None of the above         7/22/2025   Driving Risk Screening   Patient/family members have concerns about driving No         7/22/2025   General Alertness/Fatigue Screening   Have you been more tired than usual lately? (!) YES         7/22/2025   Urinary Incontinence Screening   Bothered by leaking urine in past 6 months Yes         Today's PHQ-2 Score:       7/22/2025     1:21 PM   PHQ-2 ( 1999 [employer])   Q1: Little interest or pleasure in doing things 0   Q2: Feeling down, depressed or hopeless 0   PHQ-2 Score 0    Q1: Little interest or pleasure in doing things Not at all   Q2: Feeling down, depressed or hopeless Not at all   PHQ-2 Score 0       Patient-reported           7/22/2025   Substance Use   Alcohol more than 3/day or more than 7/wk No   Do you have a current opioid prescription? No   How severe/bad is pain from 1 to 10? 2/10   Do you use any other substances recreationally? No     Social History     Tobacco Use     Smoking status: Never     Passive exposure: Never    Smokeless tobacco: Never   Vaping Use    Vaping status: Never Used   Substance Use Topics    Alcohol use: Yes     Comment: 0-1/day                    Reviewed and updated as needed this visit by Provider                    Past Medical History:   Diagnosis Date    Acute bilateral low back pain without sciatica 04/15/2024    Age-related osteoporosis with current pathological fracture with routine healing 11/09/2020    Pelvic fracture after fall.  New baseline DEXA with T score -1.2 but begin Fosamax No 2020. Previous DEXA 2018 T score -1.3    Atrial fibrillation with RVR (H)     Hospitalized with syncope found to have atrial fibrillation with uncontrolled ventricular rate.  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    Essential hypertension 11/28/2016    Facial skin lesion 03/18/2021    Suspicious for basal cell    Family history of colon cancer     Declines having colonoscopy    Glaucoma     History of pulmonary embolus (PE) 11/28/2016    Factor V Leiden mutation, family history pulmonary embolus    LBBB (left bundle branch block)     Left foot pain 01/29/2019    Mild cognitive impairment 12/23/2022    Multiple closed fractures of pelvis without disruption of pelvic ring with routine healing     Left superior inferior pubic ramus fracture following fall September 2020    Osteoarthritis of multiple joints 11/28/2016    Pacemaker     Paroxysmal atrial fibrillation (H) 11/09/2020    Syncopal episode with fall and pelvic fracture found to have paroxysmal atrial fibrillation with rapid ventricular rate.  Underwent ablation and placement of pacemaker    Peripheral edema 01/15/2018    Polyarthritis rheumatica (H)     Tachy-kayleen syndrome (H)     Vitamin D deficiency      Past  "Surgical History:   Procedure Laterality Date    IMPLANT PACEMAKER      IL DISCISSION,2ND CATARACT,LASER Left 02/03/2016    Procedure: LASER YAG CAPSULOTOMY, LEFT;  Surgeon: Jitendra Rick MD;  Location: [facility];  Service: Ophthalmology    IL DISCISSION,2ND CATARACT,LASER Right 02/17/2016    Procedure: LASER YAG CAPSULOTOMY, RIGHT;  Surgeon: Jitendra Rick MD;  Location: [facility];  Service: Ophthalmology     Current providers sharing in care for this patient include:  Patient Care Team:  Dawson Cloud MD as PCP - General (Internal Medicine)  Dawson Cloud MD as Assigned PCP    The following health maintenance items are reviewed in Epic and correct as of today:  Health Maintenance   Topic Date Due    ZOSTER VACCINE (2 of 3) 02/26/2009    RSV VACCINE (1 - 1-dose 75+ series) Never done    DTAP/TDAP/TD VACCINE (2 - Td or Tdap) 01/01/2023    COVID-19 VACCINE (5 - 2024-25 season) 09/01/2024    MEDICARE ANNUAL WELLNESS VISIT  04/26/2025    BMP  04/26/2025    LIPID  04/26/2025    ANNUAL REVIEW OF HM ORDERS  04/26/2025    INFLUENZA VACCINE (1) 09/01/2025    HEMOGLOBIN  02/20/2026    FALL RISK ASSESSMENT  07/22/2026    ADVANCE CARE PLANNING  04/26/2029    DEXA  01/13/2036    PHQ-2 (once per calendar year)  Completed    PNEUMOCOCCAL VACCINE 50+ YEARS  Completed    URINALYSIS  Completed    HPV VACCINE  Aged Out    MENINGITIS VACCINE  Aged Out    MICROALBUMIN  Discontinued         Review of Systems  Constitutional, HEENT, cardiovascular, pulmonary, GI, , musculoskeletal, neuro, skin, endocrine and psych systems are negative, except as otherwise noted.     Objective    Exam  BP (!) 148/88 (BP Location: Right arm, Patient Position: Sitting, Cuff Size: Adult Regular)   Pulse 67   Temp 98.3  F (36.8  C) (Oral)   Resp 14   Ht 1.613 m (5' 3.5\")   Wt 71.2 kg (157 lb)   LMP  (LMP Unknown)   SpO2 97%   BMI 27.38 kg/m     Estimated body mass index is 27.38 kg/m  as calculated from the following:    Height as " "of this encounter: 1.613 m (5' 3.5\").    Weight as of this encounter: 71.2 kg (157 lb).    Physical Exam  EYES: Eyelids, conjunctiva, and sclera were normal. Pupils were normal. Cornea, iris, and lens were normal bilaterally.  HEAD, EARS, NOSE, MOUTH, AND THROAT: Head and face were normal. TMs and external auditory canals are normal.  Oropharynx normal  NECK: Neck appearance was normal. There were no neck masses and the thyroid was not enlarged and no nodules are felt.  No lymphadenopathy.  RESPIRATORY: Breathing pattern was normal and the chest moved symmetrically.   Lung sounds were normal and there were no rales or wheezes.  CARDIOVASCULAR: Heart rate and rhythm were normal.  S1 and S2 were normal and there were no extra sounds or murmurs. Peripheral pulses in arms and legs were normal.  Trace bilateral lower extremity edema.  No carotid bruits.  GASTROINTESTINAL:  Bowel sounds were present.   Palpation detected no tenderness, mass, or enlarged organs.   MUSCULOSKELETAL: Skeletal configuration was normal and muscle mass was normal for age. Joint appearance was overall normal.  LYMPHATIC: There were no enlarged nodes.  SKIN/HAIR/NAILS: Skin color was normal.  There were no concerning skin lesions.  NEUROLOGIC: The patient was alert and oriented to person, place, time, and circumstance. Speech was normal. Cranial nerves were normal. Motor strength was normal for age. The patient was normally coordinated.    PSYCHIATRIC:  Mood and affect were normal         7/22/2025   Mini Cog   Clock Draw Score 2 Normal   3 Item Recall 2 objects recalled   Mini Cog Total Score 4              Signed Electronically by: Dawson Cloud MD    "

## 2025-07-22 NOTE — PATIENT INSTRUCTIONS
Patient Education   Preventive Care Advice   This is general advice given by our system to help you stay healthy. However, your care team may have specific advice just for you. Please talk to your care team about your preventive care needs.  Nutrition  Eat 5 or more servings of fruits and vegetables each day.  Try wheat bread, brown rice and whole grain pasta (instead of white bread, rice, and pasta).  Get enough calcium and vitamin D. Check the label on foods and aim for 100% of the RDA (recommended daily allowance).  Lifestyle  Exercise at least 150 minutes each week  (30 minutes a day, 5 days a week).  Do muscle strengthening activities 2 days a week. These help control your weight and prevent disease.  No smoking.  Wear sunscreen to prevent skin cancer.  Have a dental exam and cleaning every 6 months.  Annual eye exam  Yearly exams  See your health care team every year to talk about:  Any changes in your health.  Any medicines your care team has prescribed.  Preventive care, family planning, and ways to prevent chronic diseases.  Shots (vaccines)   COVID-19 shot: Recommended this fall if available  Flu shot: Get a flu shot every year.  Tetanus shot: Get a tetanus shot every 10 years.  You are due for a booster.  You can schedule a Td vaccine at your pharmacy  Pneumococcal vaccines completed.  I would recommend getting an RSV vaccine at your pharmacy  Shingles shot (for age 50 and up).  Shingrix is recommended.  This can be scheduled at your pharmacy  General health tests  Diabetes screening:  Cholesterol screening  Bone density scan (DEXA): DEXA should be repeated in January 2026  Cancer screening tests  Breast cancer scan (mammogram): Consider annual mammogram for breast cancer screening  Colon cancer screening: It is important to start screening for colon cancer at age 45.  Further colonoscopy is not recommended at your age as the risk of the procedure would likely outweigh any benefit  For informational  purposes only. Not to replace the advice of your health care provider. Copyright   2023 [facility]. All rights reserved. Clinically reviewed by the [facility]. [employer] Render [id number] - REV 01/24.  Learning About Activities of Daily Living  What are activities of daily living?     Activities of daily living (ADLs) are the basic self-care tasks you do every day. These include eating, bathing, dressing, and moving around.  As you age, and if you have health problems, you may find that it's harder to do some of these tasks. If so, your doctor can suggest ideas that may help.  To measure what kind of help you may need, your doctor will ask how well you are able to do ADLs. Let your doctor know if there are any tasks that you are having trouble doing. This is an important first step to getting help. And when you have the help you need, you can stay as independent as possible.  How will a doctor assess your ADLs?  Asking about ADLs is part of a routine health checkup your doctor will likely do as you age. Your health check might be done in a doctor's office, in your home, or at a hospital. The goal is to find out if you are having any problems that could make it hard to care for yourself or that make it unsafe for you to be on your own.  To measure your ADLs, your doctor will ask how hard it is for you to do routine tasks. Your doctor may also want to know if you have changed the way you do a task because of a health problem. Your doctor may watch how you:  Walk back and forth.  Keep your balance while you stand or walk.  Move from sitting to standing or from a bed to a chair.  Button or unbutton a shirt or sweater.  Remove and put on your shoes.  It's common to feel a little worried or anxious if you find you can't do all the things you used to be able to do. Talking with your doctor about ADLs is a way to make sure you're as safe as possible and able to care for yourself as well as you  can. You may want to bring a caregiver, friend, or family member to your checkup. They can help you talk to your doctor.  Follow-up care is a key part of your treatment and safety. Be sure to make and go to all appointments, and call your doctor if you are having problems. It's also a good idea to know your test results and keep a list of the medicines you take.  Current as of: October 24, 2024  Content Version: 14.5    6097-2982 [employer].   Care instructions adapted under license by your healthcare professional. If you have questions about a medical condition or this instruction, always ask your healthcare professional. [employer] disclaims any warranty or liability for your use of this information.    Preventing Falls: Care Instructions  Injuries and health problems such as trouble walking or poor eyesight can increase your risk of falling. So can some medicines. But there are things you can do to help prevent falls. You can exercise to get stronger. You can also arrange your home to make it safer.    Talk to your doctor about the medicines you take. Ask if any of them increase the risk of falls and whether they can be changed or stopped.   Try to exercise regularly. It can help improve your strength and balance. This can help lower your risk of falling.         Practice fall safety and prevention.   Wear low-heeled shoes that fit well and give your feet good support. Talk to your doctor if you have foot problems that make this hard.  Carry a cellphone or wear a medical alert device that you can use to call for help.  Use stepladders instead of chairs to reach high objects. Don't climb if you're at risk for falls. Ask for help, if needed.  Wear the correct eyeglasses, if you need them.        Make your home safer.   Remove rugs, cords, clutter, and furniture from walkways.  Keep your house well lit. Use night-lights in hallways and bathrooms.  Install and use sturdy handrails on  "stairways.  Wear nonskid footwear, even inside. Don't walk barefoot or in socks without shoes.        Be safe outside.   Use handrails, curb cuts, and ramps whenever possible.  Keep your hands free by using a shoulder bag or backpack.  Try to walk in well-lit areas. Watch out for uneven ground, changes in pavement, and debris.  Be careful in the winter. Walk on the grass or gravel when sidewalks are slippery. Use de-icer on steps and walkways. Add non-slip devices to shoes.    Put grab bars and nonskid mats in your shower or tub and near the toilet. Try to use a shower chair or bath bench when bathing.   Get into a tub or shower by putting in your weaker leg first. Get out with your strong side first. Have a phone or medical alert device in the bathroom with you.   Where can you learn more?  Go to https://www.[employer].Kannact/patiented  Enter G117 in the search box to learn more about \"Preventing Falls: Care Instructions.\"  Current as of: July 31, 2024  Content Version: 14.5    8890-9284 [employer].   Care instructions adapted under license by your healthcare professional. If you have questions about a medical condition or this instruction, always ask your healthcare professional. [employer] disclaims any warranty or liability for your use of this information.    Bladder Training: Care Instructions  Your Care Instructions     Bladder training is used to treat urge incontinence and stress incontinence. Urge incontinence means that the need to urinate comes on so fast that you can't get to a toilet in time. Stress incontinence means that you leak urine because of pressure on your bladder. For example, it may happen when you laugh, cough, or lift something heavy.  Bladder training can increase how long you can wait before you have to urinate. It can also help your bladder hold more urine. And it can give you better control over the urge to urinate.  It is important to remember that bladder " training takes a few weeks to a few months to make a difference. You may not see results right away, but don't give up.  Follow-up care is a key part of your treatment and safety. Be sure to make and go to all appointments, and call your doctor if you are having problems. It's also a good idea to know your test results and keep a list of the medicines you take.  How can you care for yourself at home?  Work with your doctor to come up with a bladder training program that is right for you. You may use one or more of the following methods.  Delayed urination  In the beginning, try to keep from urinating for 5 minutes after you first feel the need to go.  While you wait, take deep, slow breaths to relax. Kegel exercises can also help you delay the need to go to the bathroom.  After some practice, when you can easily wait 5 minutes to urinate, try to wait 10 minutes before you urinate.  Slowly increase the waiting period until you are able to control when you have to urinate.  Scheduled urination  Empty your bladder when you first wake up in the morning.  Schedule times throughout the day when you will urinate.  Start by going to the bathroom every hour, even if you don't need to go.  Slowly increase the time between trips to the bathroom.  When you have found a schedule that works well for you, keep doing it.  If you wake up during the night and have to urinate, do it. Apply your schedule to waking hours only.  Kegel exercises  These tighten and strengthen pelvic muscles, which can help you control the flow of urine. (If doing these exercises causes pain, stop doing them and talk with your doctor.) To do Kegel exercises:  Squeeze your muscles as if you were trying not to pass gas. Or squeeze your muscles as if you were stopping the flow of urine. Your belly, legs, and buttocks shouldn't move.  Hold the squeeze for 3 seconds, then relax for 5 to 10 seconds.  Start with 3 seconds, then add 1 second each week until you are  "able to squeeze for 10 seconds.  Repeat the exercise 10 times a session. Do 3 to 8 sessions a day.  When should you call for help?  Watch closely for changes in your health, and be sure to contact your doctor if:    Your incontinence is getting worse.     You do not get better as expected.   Where can you learn more?  Go to https://www.[employer].net/patiented  Enter V684 in the search box to learn more about \"Bladder Training: Care Instructions.\"  Current as of: April 30, 2024  Content Version: 14.5    8163-9420 [employer].   Care instructions adapted under license by your healthcare professional. If you have questions about a medical condition or this instruction, always ask your healthcare professional. [employer] disclaims any warranty or liability for your use of this information.    Learning About Being Active as an Older Adult  Why is being active important as you get older?     Being active is one of the best things you can do for your health. And it's never too late to start. Being active--or getting active, if you aren't already--has definite benefits. It can:  Give you more energy,  Keep your mind sharp.  Improve balance to reduce your risk of falls.  Help you manage chronic illness with fewer medicines.  No matter how old you are, how fit you are, or what health problems you have, there is a form of activity that will work for you. And the more physical activity you can do, the better your overall health will be.  What kinds of activity can help you stay healthy?  Being more active will make your daily activities easier. Physical activity includes planned exercise and things you do in daily life. There are four types of activity:  Aerobic.  Doing aerobic activity makes your heart and lungs strong.  Includes walking, dancing, and gardening.  Aim for at least 2  hours spread throughout the week.  It improves your energy and can help you sleep better.  Muscle-strengthening.  This " type of activity can help maintain muscle and strengthen bones.  Includes climbing stairs, using resistance bands, and lifting or carrying heavy loads.  Aim for at least twice a week.  It can help protect the knees and other joints.  Stretching.  Stretching gives you better range of motion in joints and muscles.  Includes upper arm stretches, calf stretches, and gentle yoga.  Aim for at least twice a week, preferably after your muscles are warmed up from other activities.  It can help you function better in daily life.  Balancing.  This helps you stay coordinated and have good posture.  Includes heel-to-toe walking, ольга chi, and certain types of yoga.  Aim for at least 3 days a week.  It can reduce your risk of falling.  Even if you have a hard time meeting the recommendations, it's better to be more active than less active. All activity done in each category counts toward your weekly total. You'd be surprised how daily things like carrying groceries, keeping up with grandchildren, and taking the stairs can add up.  What keeps you from being active?  If you've had a hard time being more active, you're not alone. Maybe you remember being able to do more. Or maybe you've never thought of yourself as being active. It's frustrating when you can't do the things you want. Being more active can help. What's holding you back?  Getting started.  Have a goal, but break it into easy tasks. Small steps build into big accomplishments.  Staying motivated.  If you feel like skipping your activity, remember your goal. Maybe you want to move better and stay independent. Every activity gets you one step closer.  Not feeling your best.  Start with 5 minutes of an activity you enjoy. Prove to yourself you can do it. As you get comfortable, increase your time.  You may not be where you want to be. But you're in the process of getting there. Everyone starts somewhere.  How can you find safe ways to stay active?  Talk with your doctor  "about any physical challenges you're facing. Make a plan with your doctor if you have a health problem or aren't sure how to get started with activity.  If you're already active, ask your doctor if there is anything you should change to stay safe as your body and health change.  If you tend to feel dizzy after you take medicine, avoid activity at that time. Try being active before you take your medicine. This will reduce your risk of falls.  If you plan to be active at home, make sure to clear your space before you get started. Remove things like TV cords, coffee tables, and throw rugs. It's safest to have plenty of space to move freely.  The key to getting more active is to take it slow and steady. Try to improve only a little bit at a time. Pick just one area to improve on at first. And if an activity hurts, stop and talk to your doctor.  Where can you learn more?  Go to https://www.[employer].net/patiented  Enter P600 in the search box to learn more about \"Learning About Being Active as an Older Adult.\"  Current as of: July 31, 2024  Content Version: 14.5    7251-2215 [employer].   Care instructions adapted under license by your healthcare professional. If you have questions about a medical condition or this instruction, always ask your healthcare professional. [employer] disclaims any warranty or liability for your use of this information.       "

## 2025-08-07 ENCOUNTER — ANTICOAGULATION THERAPY VISIT (OUTPATIENT)
Dept: ANTICOAGULATION | Facility: CLINIC | Age: OVER 89
End: 2025-08-07

## 2025-08-07 ENCOUNTER — LAB (OUTPATIENT)
Dept: LAB | Facility: CLINIC | Age: OVER 89
End: 2025-08-07
Payer: COMMERCIAL

## 2025-08-07 DIAGNOSIS — Z86.711 HISTORY OF PULMONARY EMBOLUS (PE): ICD-10-CM

## 2025-08-07 DIAGNOSIS — Z79.01 LONG TERM CURRENT USE OF ANTICOAGULANT THERAPY: Primary | ICD-10-CM

## 2025-08-07 DIAGNOSIS — Z79.01 LONG TERM CURRENT USE OF ANTICOAGULANT THERAPY: ICD-10-CM

## 2025-08-07 DIAGNOSIS — I48.0 PAROXYSMAL ATRIAL FIBRILLATION (H): ICD-10-CM

## 2025-08-07 LAB — INR BLD: 2 (ref 0.9–1.1)

## 2025-09-04 ENCOUNTER — LAB (OUTPATIENT)
Dept: LAB | Facility: CLINIC | Age: OVER 89
End: 2025-09-04
Payer: COMMERCIAL

## 2025-09-04 ENCOUNTER — ANTICOAGULATION THERAPY VISIT (OUTPATIENT)
Dept: ANTICOAGULATION | Facility: CLINIC | Age: OVER 89
End: 2025-09-04

## 2025-09-04 DIAGNOSIS — Z86.711 HISTORY OF PULMONARY EMBOLUS (PE): ICD-10-CM

## 2025-09-04 DIAGNOSIS — Z79.01 LONG TERM CURRENT USE OF ANTICOAGULANT THERAPY: Primary | ICD-10-CM

## 2025-09-04 DIAGNOSIS — Z79.01 LONG TERM CURRENT USE OF ANTICOAGULANT THERAPY: ICD-10-CM

## 2025-09-04 DIAGNOSIS — I48.0 PAROXYSMAL ATRIAL FIBRILLATION (H): ICD-10-CM

## 2025-09-04 LAB — INR BLD: 2.4 (ref 0.9–1.1)
